# Patient Record
Sex: MALE | Race: WHITE | NOT HISPANIC OR LATINO | Employment: FULL TIME | URBAN - METROPOLITAN AREA
[De-identification: names, ages, dates, MRNs, and addresses within clinical notes are randomized per-mention and may not be internally consistent; named-entity substitution may affect disease eponyms.]

---

## 2017-02-20 ENCOUNTER — HOSPITAL ENCOUNTER (INPATIENT)
Facility: MEDICAL CENTER | Age: 62
LOS: 2 days | DRG: 312 | End: 2017-02-22
Attending: EMERGENCY MEDICINE | Admitting: INTERNAL MEDICINE

## 2017-02-20 ENCOUNTER — APPOINTMENT (OUTPATIENT)
Dept: RADIOLOGY | Facility: MEDICAL CENTER | Age: 62
DRG: 312 | End: 2017-02-20
Attending: INTERNAL MEDICINE

## 2017-02-20 ENCOUNTER — APPOINTMENT (OUTPATIENT)
Dept: RADIOLOGY | Facility: MEDICAL CENTER | Age: 62
DRG: 312 | End: 2017-02-20
Attending: EMERGENCY MEDICINE

## 2017-02-20 ENCOUNTER — RESOLUTE PROFESSIONAL BILLING HOSPITAL PROF FEE (OUTPATIENT)
Dept: HOSPITALIST | Facility: MEDICAL CENTER | Age: 62
End: 2017-02-20

## 2017-02-20 DIAGNOSIS — R00.1 BRADYCARDIA WITH 51-60 BEATS PER MINUTE: ICD-10-CM

## 2017-02-20 DIAGNOSIS — R09.02 HYPOXIA: ICD-10-CM

## 2017-02-20 DIAGNOSIS — I95.1 ORTHOSTATIC HYPOTENSION: ICD-10-CM

## 2017-02-20 DIAGNOSIS — I95.2 HYPOTENSION DUE TO DRUGS: ICD-10-CM

## 2017-02-20 DIAGNOSIS — R19.5 FECAL OCCULT BLOOD TEST POSITIVE: ICD-10-CM

## 2017-02-20 DIAGNOSIS — R55 SYNCOPE, UNSPECIFIED SYNCOPE TYPE: ICD-10-CM

## 2017-02-20 LAB
ALBUMIN SERPL BCP-MCNC: 3.8 G/DL (ref 3.2–4.9)
ALBUMIN/GLOB SERPL: 1.2 G/DL
ALP SERPL-CCNC: 63 U/L (ref 30–99)
ALT SERPL-CCNC: 17 U/L (ref 2–50)
ANION GAP SERPL CALC-SCNC: 11 MMOL/L (ref 0–11.9)
AST SERPL-CCNC: 14 U/L (ref 12–45)
BASOPHILS # BLD AUTO: 0.5 % (ref 0–1.8)
BASOPHILS # BLD: 0.05 K/UL (ref 0–0.12)
BILIRUB SERPL-MCNC: 0.5 MG/DL (ref 0.1–1.5)
BNP SERPL-MCNC: 6 PG/ML (ref 0–100)
BUN SERPL-MCNC: 31 MG/DL (ref 8–22)
CALCIUM SERPL-MCNC: 9 MG/DL (ref 8.5–10.5)
CHLORIDE SERPL-SCNC: 106 MMOL/L (ref 96–112)
CO2 SERPL-SCNC: 20 MMOL/L (ref 20–33)
CREAT SERPL-MCNC: 1.56 MG/DL (ref 0.5–1.4)
DEPRECATED D DIMER PPP IA-ACNC: 289 NG/ML(D-DU)
EOSINOPHIL # BLD AUTO: 0.15 K/UL (ref 0–0.51)
EOSINOPHIL NFR BLD: 1.4 % (ref 0–6.9)
ERYTHROCYTE [DISTWIDTH] IN BLOOD BY AUTOMATED COUNT: 38.8 FL (ref 35.9–50)
GFR SERPL CREATININE-BSD FRML MDRD: 46 ML/MIN/1.73 M 2
GLOBULIN SER CALC-MCNC: 3.3 G/DL (ref 1.9–3.5)
GLUCOSE SERPL-MCNC: 133 MG/DL (ref 65–99)
HCT VFR BLD AUTO: 39.6 % (ref 42–52)
HGB BLD-MCNC: 13.2 G/DL (ref 14–18)
IMM GRANULOCYTES # BLD AUTO: 0.04 K/UL (ref 0–0.11)
IMM GRANULOCYTES NFR BLD AUTO: 0.4 % (ref 0–0.9)
LYMPHOCYTES # BLD AUTO: 3.25 K/UL (ref 1–4.8)
LYMPHOCYTES NFR BLD: 30 % (ref 22–41)
MCH RBC QN AUTO: 28.8 PG (ref 27–33)
MCHC RBC AUTO-ENTMCNC: 33.3 G/DL (ref 33.7–35.3)
MCV RBC AUTO: 86.5 FL (ref 81.4–97.8)
MONOCYTES # BLD AUTO: 0.62 K/UL (ref 0–0.85)
MONOCYTES NFR BLD AUTO: 5.7 % (ref 0–13.4)
NEUTROPHILS # BLD AUTO: 6.73 K/UL (ref 1.82–7.42)
NEUTROPHILS NFR BLD: 62 % (ref 44–72)
NRBC # BLD AUTO: 0 K/UL
NRBC BLD AUTO-RTO: 0 /100 WBC
PLATELET # BLD AUTO: 264 K/UL (ref 164–446)
PMV BLD AUTO: 10.5 FL (ref 9–12.9)
POTASSIUM SERPL-SCNC: 3.8 MMOL/L (ref 3.6–5.5)
PROT SERPL-MCNC: 7.1 G/DL (ref 6–8.2)
RBC # BLD AUTO: 4.58 M/UL (ref 4.7–6.1)
SODIUM SERPL-SCNC: 137 MMOL/L (ref 135–145)
TROPONIN I SERPL-MCNC: <0.01 NG/ML (ref 0–0.04)
WBC # BLD AUTO: 10.8 K/UL (ref 4.8–10.8)

## 2017-02-20 PROCEDURE — 96374 THER/PROPH/DIAG INJ IV PUSH: CPT

## 2017-02-20 PROCEDURE — 80053 COMPREHEN METABOLIC PANEL: CPT

## 2017-02-20 PROCEDURE — 85379 FIBRIN DEGRADATION QUANT: CPT

## 2017-02-20 PROCEDURE — 74000 DX-ABDOMEN-1 VIEW: CPT

## 2017-02-20 PROCEDURE — C9113 INJ PANTOPRAZOLE SODIUM, VIA: HCPCS | Performed by: EMERGENCY MEDICINE

## 2017-02-20 PROCEDURE — 99223 1ST HOSP IP/OBS HIGH 75: CPT | Performed by: INTERNAL MEDICINE

## 2017-02-20 PROCEDURE — 700101 HCHG RX REV CODE 250: Performed by: EMERGENCY MEDICINE

## 2017-02-20 PROCEDURE — 84484 ASSAY OF TROPONIN QUANT: CPT

## 2017-02-20 PROCEDURE — 99285 EMERGENCY DEPT VISIT HI MDM: CPT

## 2017-02-20 PROCEDURE — 770020 HCHG ROOM/CARE - TELE (206)

## 2017-02-20 PROCEDURE — 93306 TTE W/DOPPLER COMPLETE: CPT | Mod: 26 | Performed by: INTERNAL MEDICINE

## 2017-02-20 PROCEDURE — 700111 HCHG RX REV CODE 636 W/ 250 OVERRIDE (IP): Performed by: EMERGENCY MEDICINE

## 2017-02-20 PROCEDURE — 93005 ELECTROCARDIOGRAM TRACING: CPT | Performed by: EMERGENCY MEDICINE

## 2017-02-20 PROCEDURE — 82272 OCCULT BLD FECES 1-3 TESTS: CPT

## 2017-02-20 PROCEDURE — 700105 HCHG RX REV CODE 258: Performed by: EMERGENCY MEDICINE

## 2017-02-20 PROCEDURE — 85025 COMPLETE CBC W/AUTO DIFF WBC: CPT

## 2017-02-20 PROCEDURE — 94640 AIRWAY INHALATION TREATMENT: CPT

## 2017-02-20 PROCEDURE — 83540 ASSAY OF IRON: CPT

## 2017-02-20 PROCEDURE — 82728 ASSAY OF FERRITIN: CPT

## 2017-02-20 PROCEDURE — 83550 IRON BINDING TEST: CPT

## 2017-02-20 PROCEDURE — 83880 ASSAY OF NATRIURETIC PEPTIDE: CPT

## 2017-02-20 PROCEDURE — 71010 DX-CHEST-PORTABLE (1 VIEW): CPT

## 2017-02-20 PROCEDURE — 96361 HYDRATE IV INFUSION ADD-ON: CPT

## 2017-02-20 PROCEDURE — 36415 COLL VENOUS BLD VENIPUNCTURE: CPT

## 2017-02-20 RX ORDER — BISACODYL 10 MG
10 SUPPOSITORY, RECTAL RECTAL
Status: DISCONTINUED | OUTPATIENT
Start: 2017-02-20 | End: 2017-02-22 | Stop reason: HOSPADM

## 2017-02-20 RX ORDER — SODIUM CHLORIDE 9 MG/ML
INJECTION, SOLUTION INTRAVENOUS CONTINUOUS
Status: DISCONTINUED | OUTPATIENT
Start: 2017-02-21 | End: 2017-02-21

## 2017-02-20 RX ORDER — LOVASTATIN 20 MG/1
20 TABLET ORAL NIGHTLY
Status: DISCONTINUED | OUTPATIENT
Start: 2017-02-21 | End: 2017-02-22 | Stop reason: HOSPADM

## 2017-02-20 RX ORDER — GLYBURIDE 2.5 MG/1
2.5 TABLET ORAL 2 TIMES DAILY WITH MEALS
Status: DISCONTINUED | OUTPATIENT
Start: 2017-02-21 | End: 2017-02-22 | Stop reason: HOSPADM

## 2017-02-20 RX ORDER — ONDANSETRON 4 MG/1
4 TABLET, FILM COATED ORAL 2 TIMES DAILY PRN
Status: DISCONTINUED | OUTPATIENT
Start: 2017-02-20 | End: 2017-02-20

## 2017-02-20 RX ORDER — ONDANSETRON 2 MG/ML
4 INJECTION INTRAMUSCULAR; INTRAVENOUS EVERY 4 HOURS PRN
Status: DISCONTINUED | OUTPATIENT
Start: 2017-02-20 | End: 2017-02-22 | Stop reason: HOSPADM

## 2017-02-20 RX ORDER — LACTULOSE 20 G/30ML
30 SOLUTION ORAL
Status: DISCONTINUED | OUTPATIENT
Start: 2017-02-20 | End: 2017-02-22 | Stop reason: HOSPADM

## 2017-02-20 RX ORDER — ENEMA 19; 7 G/133ML; G/133ML
1 ENEMA RECTAL
Status: DISCONTINUED | OUTPATIENT
Start: 2017-02-20 | End: 2017-02-22 | Stop reason: HOSPADM

## 2017-02-20 RX ORDER — SODIUM CHLORIDE 9 MG/ML
1000 INJECTION, SOLUTION INTRAVENOUS ONCE
Status: COMPLETED | OUTPATIENT
Start: 2017-02-20 | End: 2017-02-20

## 2017-02-20 RX ORDER — CHLORAL HYDRATE 500 MG
1000 CAPSULE ORAL 2 TIMES DAILY
Status: DISCONTINUED | OUTPATIENT
Start: 2017-02-21 | End: 2017-02-22 | Stop reason: HOSPADM

## 2017-02-20 RX ORDER — FAMOTIDINE 20 MG/1
20 TABLET, FILM COATED ORAL 2 TIMES DAILY
Status: DISCONTINUED | OUTPATIENT
Start: 2017-02-21 | End: 2017-02-22 | Stop reason: HOSPADM

## 2017-02-20 RX ORDER — IPRATROPIUM BROMIDE AND ALBUTEROL SULFATE 2.5; .5 MG/3ML; MG/3ML
3 SOLUTION RESPIRATORY (INHALATION) ONCE
Status: COMPLETED | OUTPATIENT
Start: 2017-02-20 | End: 2017-02-20

## 2017-02-20 RX ORDER — ACETAMINOPHEN 325 MG/1
650 TABLET ORAL EVERY 6 HOURS PRN
Status: DISCONTINUED | OUTPATIENT
Start: 2017-02-20 | End: 2017-02-22 | Stop reason: HOSPADM

## 2017-02-20 RX ORDER — LORAZEPAM 1 MG/1
0.5 TABLET ORAL EVERY 6 HOURS PRN
Status: DISCONTINUED | OUTPATIENT
Start: 2017-02-20 | End: 2017-02-21

## 2017-02-20 RX ORDER — ONDANSETRON 4 MG/1
4 TABLET, ORALLY DISINTEGRATING ORAL EVERY 4 HOURS PRN
Status: DISCONTINUED | OUTPATIENT
Start: 2017-02-20 | End: 2017-02-22 | Stop reason: HOSPADM

## 2017-02-20 RX ORDER — LABETALOL HYDROCHLORIDE 5 MG/ML
10 INJECTION, SOLUTION INTRAVENOUS EVERY 4 HOURS PRN
Status: DISCONTINUED | OUTPATIENT
Start: 2017-02-20 | End: 2017-02-21

## 2017-02-20 RX ORDER — RISPERIDONE 1 MG/1
3 TABLET ORAL
Status: DISCONTINUED | OUTPATIENT
Start: 2017-02-21 | End: 2017-02-22 | Stop reason: HOSPADM

## 2017-02-20 RX ORDER — DOCUSATE SODIUM 100 MG/1
100 CAPSULE, LIQUID FILLED ORAL 2 TIMES DAILY
Status: DISCONTINUED | OUTPATIENT
Start: 2017-02-20 | End: 2017-02-22 | Stop reason: HOSPADM

## 2017-02-20 RX ORDER — PROMETHAZINE HYDROCHLORIDE 25 MG/1
12.5-25 TABLET ORAL EVERY 4 HOURS PRN
Status: DISCONTINUED | OUTPATIENT
Start: 2017-02-20 | End: 2017-02-22 | Stop reason: HOSPADM

## 2017-02-20 RX ORDER — HEPARIN SODIUM 5000 [USP'U]/ML
5000 INJECTION, SOLUTION INTRAVENOUS; SUBCUTANEOUS EVERY 8 HOURS
Status: DISCONTINUED | OUTPATIENT
Start: 2017-02-21 | End: 2017-02-22 | Stop reason: HOSPADM

## 2017-02-20 RX ORDER — AMOXICILLIN 250 MG
1 CAPSULE ORAL
Status: DISCONTINUED | OUTPATIENT
Start: 2017-02-20 | End: 2017-02-22 | Stop reason: HOSPADM

## 2017-02-20 RX ORDER — PRAZOSIN HYDROCHLORIDE 1 MG/1
1 CAPSULE ORAL NIGHTLY
Status: DISCONTINUED | OUTPATIENT
Start: 2017-02-21 | End: 2017-02-21

## 2017-02-20 RX ORDER — GEMFIBROZIL 600 MG/1
600 TABLET, FILM COATED ORAL 2 TIMES DAILY
Status: DISCONTINUED | OUTPATIENT
Start: 2017-02-20 | End: 2017-02-21

## 2017-02-20 RX ORDER — DEXTROSE MONOHYDRATE 25 G/50ML
25 INJECTION, SOLUTION INTRAVENOUS
Status: DISCONTINUED | OUTPATIENT
Start: 2017-02-20 | End: 2017-02-22 | Stop reason: HOSPADM

## 2017-02-20 RX ORDER — GABAPENTIN 300 MG/1
300 CAPSULE ORAL 3 TIMES DAILY
Status: DISCONTINUED | OUTPATIENT
Start: 2017-02-21 | End: 2017-02-22 | Stop reason: HOSPADM

## 2017-02-20 RX ORDER — AMOXICILLIN 250 MG
1 CAPSULE ORAL NIGHTLY
Status: DISCONTINUED | OUTPATIENT
Start: 2017-02-20 | End: 2017-02-22 | Stop reason: HOSPADM

## 2017-02-20 RX ORDER — PROMETHAZINE HYDROCHLORIDE 25 MG/1
12.5-25 SUPPOSITORY RECTAL EVERY 4 HOURS PRN
Status: DISCONTINUED | OUTPATIENT
Start: 2017-02-20 | End: 2017-02-22 | Stop reason: HOSPADM

## 2017-02-20 RX ADMIN — SODIUM CHLORIDE 80 MG: 9 INJECTION, SOLUTION INTRAVENOUS at 23:48

## 2017-02-20 RX ADMIN — IPRATROPIUM BROMIDE AND ALBUTEROL SULFATE 3 ML: .5; 3 SOLUTION RESPIRATORY (INHALATION) at 23:18

## 2017-02-20 RX ADMIN — SODIUM CHLORIDE 1000 ML: 9 INJECTION, SOLUTION INTRAVENOUS at 23:03

## 2017-02-20 RX ADMIN — SODIUM CHLORIDE 1000 ML: 9 INJECTION, SOLUTION INTRAVENOUS at 20:24

## 2017-02-20 RX ADMIN — SODIUM CHLORIDE 1000 ML: 9 INJECTION, SOLUTION INTRAVENOUS at 22:39

## 2017-02-20 ASSESSMENT — COPD QUESTIONNAIRES
HAVE YOU SMOKED AT LEAST 100 CIGARETTES IN YOUR ENTIRE LIFE: NO/DON'T KNOW
DO YOU EVER COUGH UP ANY MUCUS OR PHLEGM?: NO/ONLY WITH OCCASIONAL COLDS OR INFECTIONS
DURING THE PAST 4 WEEKS HOW MUCH DID YOU FEEL SHORT OF BREATH: SOME OF THE TIME
COPD SCREENING SCORE: 3

## 2017-02-20 ASSESSMENT — LIFESTYLE VARIABLES: EVER_SMOKED: NEVER

## 2017-02-20 NOTE — IP AVS SNAPSHOT
centrose Access Code: [email or web address]  Expires: 3/24/2017  5:44 PM    Your email address is not on file at [facility].  Email Addresses are required for you to sign up for centrose, please contact [phone] to verify your personal information and to provide your email address prior to attempting to register for centrose.    Satya Aguilar  [facility] [address]    centrose  A secure, online tool to manage your health information     [facility]’s centrose® is a secure, online tool that connects you to your personalized health information from the privacy of your home -- day or night - making it very easy for you to manage your healthcare. Once the activation process is completed, you can even access your medical information using the centrose janice, which is available for free in the Apple Janice store or Google Play store.     To learn more about centrose, visit [email or web address]    There are two levels of access available (as shown below):   My Chart Features  [facility] Primary Care Doctor [facility]  Specialists [facility]  Urgent  Care Non-[facility] Primary Care Doctor   Email your healthcare team securely and privately 24/7 X X X    Manage appointments: schedule your next appointment; view details of past/upcoming appointments X      Request prescription refills. X      View recent personal medical records, including lab and immunizations X X X X   View health record, including health history, allergies, medications X X X X   Read reports about your outpatient visits, procedures, consult and ER notes X X X X   See your discharge summary, which is a recap of your hospital and/or ER visit that includes your diagnosis, lab results, and care plan X X  X     How to register for NextDocst:  Once your e-mail address has been verified, follow the following steps to sign up for NextDocst.     1. Go to  [email or web address]  2. Click on the Sign Up Now box, which takes you to the New Member Sign Up  page. You will need to provide the following information:  a. Enter your MobileTag Access Code exactly as it appears at the top of this page. (You will not need to use this code after you’ve completed the sign-up process. If you do not sign up before the expiration date, you must request a new code.)   b. Enter your date of birth.   c. Enter your home email address.   d. Click Submit, and follow the next screen’s instructions.  3. Create a MobileTag ID. This will be your MobileTag login ID and cannot be changed, so think of one that is secure and easy to remember.  4. Create a MobileTag password. You can change your password at any time.  5. Enter your Password Reset Question and Answer. This can be used at a later time if you forget your password.   6. Enter your e-mail address. This allows you to receive e-mail notifications when new information is available in MobileTag.  7. Click Sign Up. You can now view your health information.    For assistance activating your MobileTag account, call [phone]

## 2017-02-20 NOTE — IP AVS SNAPSHOT
" Home Care Instructions                                                                                                                  Name:Satya Aguilar  Medical Record Number:[id number]  CSN: [id number]    YOB: 1955   Age: 61 y.o.  Sex: male  HT:1.905 m (6' 3\") WT: 98 kg (216 lb 0.8 oz)          Admit Date: 2/20/2017     Discharge Date:   Today's Date: 2/22/2017  Attending Doctor:  Kayla Reynolds M.D.                  Allergies:  Review of patient's allergies indicates no known allergies.            Discharge Instructions       Discharge Instructions    Discharged to home by taxi with self. Discharged via walking, hospital escort: Refused.  Special equipment needed: Oxygen    Be sure to schedule a follow-up appointment with your primary care doctor or any specialists as instructed.     Discharge Plan:   Diet Plan: Discussed  Activity Level: Discussed  Confirmed Follow up Appointment: Patient to Call and Schedule Appointment  Confirmed Symptoms Management: Discussed  Medication Reconciliation Updated: Yes  Influenza Vaccine Indication: Not indicated: Previously immunized this influenza season and > 8 years of age    I understand that a diet low in cholesterol, fat, and sodium is recommended for good health. Unless I have been given specific instructions below for another diet, I accept this instruction as my diet prescription.   Other diet: diabetic    Special Instructions: syncope      · Is patient discharged on Warfarin / Coumadin? No    · Is patient Post Blood Transfusion? No       Depression / Suicide Risk    As you are discharged from this [facility] facility, it is important to learn how to keep safe from harming yourself.    Recognize the warning signs:  · Abrupt changes in personality, positive or negative- including increase in energy   · Giving away possessions  · Change in eating patterns- significant weight changes-  positive or negative  · Change in sleeping patterns- unable to sleep or " sleeping all the time   · Unwillingness or inability to communicate  · Depression  · Unusual sadness, discouragement and loneliness  · Talk of wanting to die  · Neglect of personal appearance   · Rebelliousness- reckless behavior  · Withdrawal from people/activities they love  · Confusion- inability to concentrate     If you or a loved one observes any of these behaviors or has concerns about self-harm, here's what you can do:  · Talk about it- your feelings and reasons for harming yourself  · Remove any means that you might use to hurt yourself (examples: pills, rope, extension cords, firearm)  · Get professional help from the community (Mental Health, Substance Abuse, psychological counseling)  · Do not be alone:Call your Safe Contact- someone whom you trust who will be there for you.  · Call your local CRISIS HOTLINE 832-4816 or 626-191-7612  · Call your local Children's Mobile Crisis Response Team [place] (560) 542-5733 or www.[address] Bio  · Call the toll free National Suicide Prevention Hotlines   · National Suicide Prevention Lifeline 040-378-CHDA (4569)  · National Hope Line Network 800-SUICIDE (640-3673)           Discharge Medication Instructions:    Below are the medications your physician expects you to take upon discharge:    Review all your home medications and newly ordered medications with your doctor and/or pharmacist. Follow medication instructions as directed by your doctor and/or pharmacist.    Please keep your medication list with you and share with your physician.               Medication List      START taking these medications        Instructions    albuterol 108 (90 BASE) MCG/ACT Aers inhalation aerosol   Last time this was given:  2 Puffs on 2/22/2017  8:45 AM    Inhale 2 Puffs by mouth every 1 hour as needed.   Dose:  2 Puff       ascorbic acid 500 MG tablet   Last time this was given:  500 mg on 2/22/2017  2:17 PM   Commonly known as:  VITAMIN C    Take 1 Tab by mouth 3 times a  day.   Dose:  500 mg       budesonide-formoterol 160-4.5 MCG/ACT Aero   Last time this was given:  2 Puffs on 2/22/2017  2:11 PM   Commonly known as:  SYMBICORT    Inhale 2 Puffs by mouth 2 Times a Day.   Dose:  2 Puff       ferrous sulfate 325 (65 FE) MG tablet   Last time this was given:  325 mg on 2/22/2017  2:10 PM    Take 1 Tab by mouth 3 times a day, with meals.   Dose:  325 mg       tiotropium 18 MCG Caps   Last time this was given:  1 Cap on 2/22/2017  2:11 PM   Commonly known as:  SPIRIVA    Inhale 1 Cap by mouth every day.   Dose:  18 mcg         CONTINUE taking these medications        Instructions    citalopram 10 MG tablet   Commonly known as:  CELEXA    Take 10 mg by mouth every day.   Dose:  10 mg       fish oil 1000 MG Caps capsule   Last time this was given:  1,000 mg on 2/22/2017  8:24 AM    Take 1,000 mg by mouth 2 Times a Day.   Dose:  1000 mg       gabapentin 300 MG Caps   Last time this was given:  300 mg on 2/22/2017  2:17 PM   Commonly known as:  NEURONTIN    Take 300 mg by mouth 3 times a day.   Dose:  300 mg       glyBURIDE 2.5 MG Tabs   Last time this was given:  2.5 mg on 2/22/2017  8:23 AM   Commonly known as:  DIABETA    Take 2.5 mg by mouth 2 times a day, with meals.   Dose:  2.5 mg       lovastatin 20 MG Tabs   Last time this was given:  20 mg on 2/21/2017  9:14 PM   Commonly known as:  MEVACOR    Take 20 mg by mouth every evening.   Dose:  20 mg       metformin 1000 MG tablet   Commonly known as:  GLUCOPHAGE    Take 1,000 mg by mouth 2 times a day, with meals.   Dose:  1000 mg       risperidone 3 MG Tabs   Last time this was given:  3 mg on 2/21/2017  9:14 PM   Commonly known as:  RISPERDAL    Take 3 mg by mouth every evening.   Dose:  3 mg       risperidone microspheres 37.5 MG injection   Commonly known as:  RISPERDAL CONSTA    37.5 mg by Intramuscular route every 21 days.   Dose:  37.5 mg         STOP taking these medications     lisinopril 5 MG Tabs   Commonly known as:  PRINIVIL        prazosin 1 MG Caps   Commonly known as:  MINIPRESS               Instructions           Diet / Nutrition:    Follow any diet instructions given to you by your doctor or the dietician, including how much salt (sodium) you are allowed each day.    If you are overweight, talk to your doctor about a weight reduction plan.    Activity:    Remain physically active following your doctor's instructions about exercise and activity.    Rest often.     Any time you become even a little tired or short of breath, SIT DOWN and rest.    Worsening Symptoms:    Report any of the following signs and symptoms to the doctor's office immediately:    *Pain of jaw, arm, or neck  *Chest pain not relieved by medication                               *Dizziness or loss of consciousness  *Difficulty breathing even when at rest   *More tired than usual                                       *Bleeding drainage or swelling of surgical site  *Swelling of feet, ankles, legs or stomach                 *Fever (>100ºF)  *Pink or blood tinged sputum  *Weight gain (3lbs/day or 5lbs /week)           *Shock from internal defibrillator (if applicable)  *Palpitations or irregular heartbeats                *Cool and/or numb extremities    Stroke Awareness    Common Risk Factors for Stroke include:    Age  Atrial Fibrillation  Carotid Artery Stenosis  Diabetes Mellitus  Excessive alcohol consumption  High blood pressure  Overweight   Physical inactivity  Smoking    Warning signs and symptoms of a stroke include:    *Sudden numbness or weakness of the face, arm or leg (especially on one side of the body).  *Sudden confusion, trouble speaking or understanding.  *Sudden trouble seeing in one or both eyes.  *Sudden trouble walking, dizziness, loss of balance or coordination.Sudden severe headache with no known cause.    It is very important to get treatment quickly when a stroke occurs. If you experience any of the above warning signs, call 911 immediately.                     Disclaimer         Quit Smoking / Tobacco Use:    I understand the use of any tobacco products increases my chance of suffering from future heart disease or stroke and could cause other illnesses which may shorten my life. Quitting the use of tobacco products is the single most important thing I can do to improve my health. For further information on smoking / tobacco cessation call a Toll Free Quit Line at 1-986.974.4923 (*National Cancer [place]) or 1-317.468.8969 (American Lung Association) or you can access the web based program at www.lungusa.org.    [place] Tobacco Users Help Line:  (115) 769-3924       Toll Free: 1-222.493.5613  Quit Tobacco Program [facility] (055)529-9948    Crisis Hotline:    [place] Crisis Hotline:  1-464-CRBKZWM or 1-910.374.4999    [place] Crisis Hotline:    1-621.223.8686 or 133-835-1988    Discharge Survey:   Thank you for choosing [facility]. We hope we did everything we could to make your hospital stay a pleasant one. You may be receiving a phone survey and we would appreciate your time and participation in answering the questions. Your input is very valuable to us in our efforts to improve our service to our patients and their families.        My signature on this form indicates that:    1. I have reviewed and understand the above information.  2. My questions regarding this information have been answered to my satisfaction.  3. I have formulated a plan with my discharge nurse to obtain my prescribed medications for home.                  Disclaimer         __________________________________                     __________       ________                       Patient Signature                                                 Date                    Time

## 2017-02-20 NOTE — IP AVS SNAPSHOT
2/22/2017          Satya Aguilar  [address]  [place] [address]    Dear Satya:    [facility] wants to ensure your discharge home is safe and you or your loved ones have had all your questions answered regarding your care after you leave the hospital.    You may receive a telephone call within two days of your discharge.  This call is to make certain you understand your discharge instructions as well as ensure we provided you with the best care possible during your stay with us.     The call will only last approximately 3-5 minutes and will be done by a nurse.    Once again, we want to ensure your discharge home is safe and that you have a clear understanding of any next steps in your care.  If you have any questions or concerns, please do not hesitate to contact us, we are here for you.  Thank you for choosing [facility] [place] for your healthcare needs.    Sincerely,    Talat Rosen    [facility]

## 2017-02-20 NOTE — IP AVS SNAPSHOT
" <p align=\"LEFT\"><IMG SRC=\"//[email or web address]\" alt=\"Image\" WIDTH=\"50%\" HEIGHT=\"200\" BORDER=\"\"></p>                   Name:Satya Aguilar  Medical Record Number:[id number]  CSN: [id number]    YOB: 1955   Age: 61 y.o.  Sex: male  HT:1.905 m (6' 3\") WT: 98 kg (216 lb 0.8 oz)          Admit Date: 2/20/2017     Discharge Date:   Today's Date: 2/22/2017  Attending Doctor:  Kayla Reynolds M.D.                  Allergies:  Review of patient's allergies indicates no known allergies.             Medication List      Take these Medications        Instructions    albuterol 108 (90 BASE) MCG/ACT Aers inhalation aerosol    Inhale 2 Puffs by mouth every 1 hour as needed.   Dose:  2 Puff       ascorbic acid 500 MG tablet   Commonly known as:  VITAMIN C    Take 1 Tab by mouth 3 times a day.   Dose:  500 mg       budesonide-formoterol 160-4.5 MCG/ACT Aero   Commonly known as:  SYMBICORT    Inhale 2 Puffs by mouth 2 Times a Day.   Dose:  2 Puff       citalopram 10 MG tablet   Commonly known as:  CELEXA    Take 10 mg by mouth every day.   Dose:  10 mg       ferrous sulfate 325 (65 FE) MG tablet    Take 1 Tab by mouth 3 times a day, with meals.   Dose:  325 mg       fish oil 1000 MG Caps capsule    Take 1,000 mg by mouth 2 Times a Day.   Dose:  1000 mg       gabapentin 300 MG Caps   Commonly known as:  NEURONTIN    Take 300 mg by mouth 3 times a day.   Dose:  300 mg       glyBURIDE 2.5 MG Tabs   Commonly known as:  DIABETA    Take 2.5 mg by mouth 2 times a day, with meals.   Dose:  2.5 mg       lovastatin 20 MG Tabs   Commonly known as:  MEVACOR    Take 20 mg by mouth every evening.   Dose:  20 mg       metformin 1000 MG tablet   Commonly known as:  GLUCOPHAGE    Take 1,000 mg by mouth 2 times a day, with meals.   Dose:  1000 mg       risperidone 3 MG Tabs   Commonly known as:  RISPERDAL    Take 3 mg by mouth every evening.   Dose:  3 mg       risperidone microspheres 37.5 MG injection   Commonly known as:  " RISPERDAL CONSTA    37.5 mg by Intramuscular route every 21 days.   Dose:  37.5 mg       tiotropium 18 MCG Caps   Commonly known as:  SPIRIVA    Inhale 1 Cap by mouth every day.   Dose:  18 mcg

## 2017-02-21 ENCOUNTER — APPOINTMENT (OUTPATIENT)
Dept: RADIOLOGY | Facility: MEDICAL CENTER | Age: 62
DRG: 312 | End: 2017-02-21
Attending: EMERGENCY MEDICINE

## 2017-02-21 ENCOUNTER — APPOINTMENT (OUTPATIENT)
Dept: RADIOLOGY | Facility: MEDICAL CENTER | Age: 62
DRG: 312 | End: 2017-02-21
Attending: INTERNAL MEDICINE

## 2017-02-21 LAB
AMPHET UR QL SCN: NEGATIVE
ANION GAP SERPL CALC-SCNC: 7 MMOL/L (ref 0–11.9)
BARBITURATES UR QL SCN: NEGATIVE
BASOPHILS # BLD AUTO: 0.5 % (ref 0–1.8)
BASOPHILS # BLD: 0.06 K/UL (ref 0–0.12)
BENZODIAZ UR QL SCN: NEGATIVE
BUN SERPL-MCNC: 31 MG/DL (ref 8–22)
BZE UR QL SCN: NEGATIVE
CALCIUM SERPL-MCNC: 8 MG/DL (ref 8.5–10.5)
CANNABINOIDS UR QL SCN: NEGATIVE
CHLORIDE SERPL-SCNC: 111 MMOL/L (ref 96–112)
CO2 SERPL-SCNC: 21 MMOL/L (ref 20–33)
CREAT SERPL-MCNC: 1.54 MG/DL (ref 0.5–1.4)
EOSINOPHIL # BLD AUTO: 0.17 K/UL (ref 0–0.51)
EOSINOPHIL NFR BLD: 1.5 % (ref 0–6.9)
ERYTHROCYTE [DISTWIDTH] IN BLOOD BY AUTOMATED COUNT: 39.6 FL (ref 35.9–50)
ERYTHROCYTE [DISTWIDTH] IN BLOOD BY AUTOMATED COUNT: 40.8 FL (ref 35.9–50)
FERRITIN SERPL-MCNC: 195.6 NG/ML (ref 22–322)
GFR SERPL CREATININE-BSD FRML MDRD: 46 ML/MIN/1.73 M 2
GLUCOSE BLD-MCNC: 102 MG/DL (ref 65–99)
GLUCOSE BLD-MCNC: 131 MG/DL (ref 65–99)
GLUCOSE BLD-MCNC: 81 MG/DL (ref 65–99)
GLUCOSE BLD-MCNC: 90 MG/DL (ref 65–99)
GLUCOSE BLD-MCNC: 98 MG/DL (ref 65–99)
GLUCOSE SERPL-MCNC: 88 MG/DL (ref 65–99)
HCT VFR BLD AUTO: 36.2 % (ref 42–52)
HCT VFR BLD AUTO: 38.7 % (ref 42–52)
HGB BLD-MCNC: 11.6 G/DL (ref 14–18)
HGB BLD-MCNC: 12.3 G/DL (ref 14–18)
IMM GRANULOCYTES # BLD AUTO: 0.03 K/UL (ref 0–0.11)
IMM GRANULOCYTES NFR BLD AUTO: 0.3 % (ref 0–0.9)
IRON SATN MFR SERPL: 9 % (ref 15–55)
IRON SERPL-MCNC: 29 UG/DL (ref 50–180)
LV EJECT FRACT MOD 2C 99903: 66.43
LV EJECT FRACT MOD 4C 99902: 68
LV EJECT FRACT MOD BP 99901: 66.55
LYMPHOCYTES # BLD AUTO: 2.92 K/UL (ref 1–4.8)
LYMPHOCYTES NFR BLD: 26.6 % (ref 22–41)
MCH RBC QN AUTO: 28.6 PG (ref 27–33)
MCH RBC QN AUTO: 28.8 PG (ref 27–33)
MCHC RBC AUTO-ENTMCNC: 31.8 G/DL (ref 33.7–35.3)
MCHC RBC AUTO-ENTMCNC: 32 G/DL (ref 33.7–35.3)
MCV RBC AUTO: 89.2 FL (ref 81.4–97.8)
MCV RBC AUTO: 90.6 FL (ref 81.4–97.8)
MDMA UR QL SCN: NEGATIVE
METHADONE UR QL SCN: NEGATIVE
MONOCYTES # BLD AUTO: 0.91 K/UL (ref 0–0.85)
MONOCYTES NFR BLD AUTO: 8.3 % (ref 0–13.4)
NEUTROPHILS # BLD AUTO: 6.88 K/UL (ref 1.82–7.42)
NEUTROPHILS NFR BLD: 62.8 % (ref 44–72)
NRBC # BLD AUTO: 0 K/UL
NRBC BLD AUTO-RTO: 0 /100 WBC
OPIATES UR QL SCN: NEGATIVE
OXYCODONE UR QL SCN: NEGATIVE
PCP UR QL SCN: NEGATIVE
PLATELET # BLD AUTO: 213 K/UL (ref 164–446)
PLATELET # BLD AUTO: 215 K/UL (ref 164–446)
PMV BLD AUTO: 10.6 FL (ref 9–12.9)
PMV BLD AUTO: 10.7 FL (ref 9–12.9)
POTASSIUM SERPL-SCNC: 3.9 MMOL/L (ref 3.6–5.5)
PROPOXYPH UR QL SCN: NEGATIVE
RBC # BLD AUTO: 4.06 M/UL (ref 4.7–6.1)
RBC # BLD AUTO: 4.27 M/UL (ref 4.7–6.1)
SODIUM SERPL-SCNC: 139 MMOL/L (ref 135–145)
TIBC SERPL-MCNC: 340 UG/DL (ref 250–450)
TROPONIN I SERPL-MCNC: <0.01 NG/ML (ref 0–0.04)
WBC # BLD AUTO: 10.1 K/UL (ref 4.8–10.8)
WBC # BLD AUTO: 11 K/UL (ref 4.8–10.8)

## 2017-02-21 PROCEDURE — 93880 EXTRACRANIAL BILAT STUDY: CPT | Mod: 26 | Performed by: SURGERY

## 2017-02-21 PROCEDURE — 93970 EXTREMITY STUDY: CPT

## 2017-02-21 PROCEDURE — 700102 HCHG RX REV CODE 250 W/ 637 OVERRIDE(OP): Performed by: HOSPITALIST

## 2017-02-21 PROCEDURE — A9270 NON-COVERED ITEM OR SERVICE: HCPCS | Performed by: HOSPITALIST

## 2017-02-21 PROCEDURE — 97161 PT EVAL LOW COMPLEX 20 MIN: CPT

## 2017-02-21 PROCEDURE — 36415 COLL VENOUS BLD VENIPUNCTURE: CPT

## 2017-02-21 PROCEDURE — 70450 CT HEAD/BRAIN W/O DYE: CPT

## 2017-02-21 PROCEDURE — 82962 GLUCOSE BLOOD TEST: CPT | Mod: 91

## 2017-02-21 PROCEDURE — 700102 HCHG RX REV CODE 250 W/ 637 OVERRIDE(OP): Performed by: INTERNAL MEDICINE

## 2017-02-21 PROCEDURE — 93880 EXTRACRANIAL BILAT STUDY: CPT

## 2017-02-21 PROCEDURE — 96375 TX/PRO/DX INJ NEW DRUG ADDON: CPT

## 2017-02-21 PROCEDURE — 700111 HCHG RX REV CODE 636 W/ 250 OVERRIDE (IP): Performed by: INTERNAL MEDICINE

## 2017-02-21 PROCEDURE — 85025 COMPLETE CBC W/AUTO DIFF WBC: CPT

## 2017-02-21 PROCEDURE — 80048 BASIC METABOLIC PNL TOTAL CA: CPT

## 2017-02-21 PROCEDURE — 80307 DRUG TEST PRSMV CHEM ANLYZR: CPT

## 2017-02-21 PROCEDURE — 84484 ASSAY OF TROPONIN QUANT: CPT

## 2017-02-21 PROCEDURE — A9270 NON-COVERED ITEM OR SERVICE: HCPCS | Performed by: INTERNAL MEDICINE

## 2017-02-21 PROCEDURE — 96361 HYDRATE IV INFUSION ADD-ON: CPT

## 2017-02-21 PROCEDURE — G8978 MOBILITY CURRENT STATUS: HCPCS | Mod: CH

## 2017-02-21 PROCEDURE — 700105 HCHG RX REV CODE 258: Performed by: INTERNAL MEDICINE

## 2017-02-21 PROCEDURE — 85027 COMPLETE CBC AUTOMATED: CPT

## 2017-02-21 PROCEDURE — 93306 TTE W/DOPPLER COMPLETE: CPT

## 2017-02-21 PROCEDURE — 700105 HCHG RX REV CODE 258: Performed by: EMERGENCY MEDICINE

## 2017-02-21 PROCEDURE — G8980 MOBILITY D/C STATUS: HCPCS | Mod: CH

## 2017-02-21 PROCEDURE — 700111 HCHG RX REV CODE 636 W/ 250 OVERRIDE (IP): Performed by: EMERGENCY MEDICINE

## 2017-02-21 PROCEDURE — G8979 MOBILITY GOAL STATUS: HCPCS | Mod: CH

## 2017-02-21 PROCEDURE — 99233 SBSQ HOSP IP/OBS HIGH 50: CPT | Performed by: HOSPITALIST

## 2017-02-21 PROCEDURE — 770020 HCHG ROOM/CARE - TELE (206)

## 2017-02-21 PROCEDURE — 93970 EXTREMITY STUDY: CPT | Mod: 26 | Performed by: SURGERY

## 2017-02-21 RX ORDER — GABAPENTIN 300 MG/1
300 CAPSULE ORAL 3 TIMES DAILY
COMMUNITY
End: 2017-05-03

## 2017-02-21 RX ORDER — RISPERIDONE 3 MG/1
4 TABLET ORAL EVERY EVENING
COMMUNITY
End: 2017-06-01

## 2017-02-21 RX ORDER — ASCORBIC ACID 500 MG
500 TABLET ORAL 3 TIMES DAILY
Status: DISCONTINUED | OUTPATIENT
Start: 2017-02-21 | End: 2017-02-22 | Stop reason: HOSPADM

## 2017-02-21 RX ORDER — FERROUS SULFATE 325(65) MG
325 TABLET ORAL
Status: DISCONTINUED | OUTPATIENT
Start: 2017-02-21 | End: 2017-02-22 | Stop reason: HOSPADM

## 2017-02-21 RX ORDER — SODIUM CHLORIDE 9 MG/ML
1000 INJECTION, SOLUTION INTRAVENOUS ONCE
Status: COMPLETED | OUTPATIENT
Start: 2017-02-21 | End: 2017-02-21

## 2017-02-21 RX ORDER — CITALOPRAM HYDROBROMIDE 10 MG/1
10 TABLET ORAL DAILY
Status: ON HOLD | COMMUNITY
End: 2017-06-03

## 2017-02-21 RX ADMIN — GLUCAGON HYDROCHLORIDE 1 MG: 1 INJECTION, POWDER, FOR SOLUTION INTRAMUSCULAR; INTRAVENOUS; SUBCUTANEOUS at 01:00

## 2017-02-21 RX ADMIN — SODIUM CHLORIDE: 9 INJECTION, SOLUTION INTRAVENOUS at 12:42

## 2017-02-21 RX ADMIN — OXYCODONE HYDROCHLORIDE AND ACETAMINOPHEN 500 MG: 500 TABLET ORAL at 15:11

## 2017-02-21 RX ADMIN — OMEGA-3 FATTY ACIDS CAP 1000 MG 1000 MG: 1000 CAP at 21:14

## 2017-02-21 RX ADMIN — HEPARIN SODIUM 5000 UNITS: 5000 INJECTION, SOLUTION INTRAVENOUS; SUBCUTANEOUS at 21:13

## 2017-02-21 RX ADMIN — FAMOTIDINE 20 MG: 20 TABLET, FILM COATED ORAL at 08:26

## 2017-02-21 RX ADMIN — HEPARIN SODIUM 5000 UNITS: 5000 INJECTION, SOLUTION INTRAVENOUS; SUBCUTANEOUS at 15:11

## 2017-02-21 RX ADMIN — SODIUM CHLORIDE: 9 INJECTION, SOLUTION INTRAVENOUS at 03:57

## 2017-02-21 RX ADMIN — FAMOTIDINE 20 MG: 20 TABLET, FILM COATED ORAL at 21:14

## 2017-02-21 RX ADMIN — Medication 325 MG: at 12:41

## 2017-02-21 RX ADMIN — GABAPENTIN 300 MG: 300 CAPSULE ORAL at 15:11

## 2017-02-21 RX ADMIN — SODIUM CHLORIDE 1000 ML: 9 INJECTION, SOLUTION INTRAVENOUS at 00:45

## 2017-02-21 RX ADMIN — OMEGA-3 FATTY ACIDS CAP 1000 MG 1000 MG: 1000 CAP at 08:26

## 2017-02-21 RX ADMIN — GABAPENTIN 300 MG: 300 CAPSULE ORAL at 08:26

## 2017-02-21 RX ADMIN — GLYBURIDE 2.5 MG: 2.5 TABLET ORAL at 17:07

## 2017-02-21 RX ADMIN — Medication 325 MG: at 08:26

## 2017-02-21 RX ADMIN — RISPERIDONE 3 MG: 1 TABLET, FILM COATED ORAL at 21:14

## 2017-02-21 RX ADMIN — Medication 325 MG: at 17:07

## 2017-02-21 RX ADMIN — GLYBURIDE 2.5 MG: 2.5 TABLET ORAL at 08:27

## 2017-02-21 RX ADMIN — OXYCODONE HYDROCHLORIDE AND ACETAMINOPHEN 500 MG: 500 TABLET ORAL at 21:14

## 2017-02-21 RX ADMIN — GABAPENTIN 300 MG: 300 CAPSULE ORAL at 21:14

## 2017-02-21 RX ADMIN — HEPARIN SODIUM 5000 UNITS: 5000 INJECTION, SOLUTION INTRAVENOUS; SUBCUTANEOUS at 06:00

## 2017-02-21 RX ADMIN — LOVASTATIN 20 MG: 20 TABLET ORAL at 21:14

## 2017-02-21 ASSESSMENT — ENCOUNTER SYMPTOMS
FEVER: 0
WEAKNESS: 0
SORE THROAT: 0
MYALGIAS: 0
VOMITING: 0
FOCAL WEAKNESS: 0
EYE PAIN: 0
SHORTNESS OF BREATH: 0
ABDOMINAL PAIN: 0
DIZZINESS: 0
LOSS OF CONSCIOUSNESS: 0
SENSORY CHANGE: 0
DIARRHEA: 0
CHILLS: 0
DEPRESSION: 0
CLAUDICATION: 0
BRUISES/BLEEDS EASILY: 0
COUGH: 0
DIAPHORESIS: 0
NAUSEA: 0
HEADACHES: 0
WHEEZING: 0
SPUTUM PRODUCTION: 0
PALPITATIONS: 0
NECK PAIN: 0
CONSTIPATION: 0
SPEECH CHANGE: 0
EYE DISCHARGE: 0
BACK PAIN: 0
HEMOPTYSIS: 0

## 2017-02-21 ASSESSMENT — LIFESTYLE VARIABLES
TOTAL SCORE: 0
HOW MANY TIMES IN THE PAST YEAR HAVE YOU HAD 5 OR MORE DRINKS IN A DAY: 0
SUBSTANCE_ABUSE: 0
HAVE PEOPLE ANNOYED YOU BY CRITICIZING YOUR DRINKING: NO
HAVE YOU EVER FELT YOU SHOULD CUT DOWN ON YOUR DRINKING: NO
EVER HAD A DRINK FIRST THING IN THE MORNING TO STEADY YOUR NERVES TO GET RID OF A HANGOVER: NO
TOTAL SCORE: 0
TOTAL SCORE: 0
ON A TYPICAL DAY WHEN YOU DRINK ALCOHOL HOW MANY DRINKS DO YOU HAVE: 0
EVER_SMOKED: NEVER
AVERAGE NUMBER OF DAYS PER WEEK YOU HAVE A DRINK CONTAINING ALCOHOL: 0
ALCOHOL_USE: YES
EVER FELT BAD OR GUILTY ABOUT YOUR DRINKING: NO
CONSUMPTION TOTAL: NEGATIVE

## 2017-02-21 ASSESSMENT — GAIT ASSESSMENTS
GAIT LEVEL OF ASSIST: STAND BY ASSIST
DEVIATION: BRADYKINETIC
DISTANCE (FEET): 250

## 2017-02-21 ASSESSMENT — PATIENT HEALTH QUESTIONNAIRE - PHQ9
SUM OF ALL RESPONSES TO PHQ QUESTIONS 1-9: 0
2. FEELING DOWN, DEPRESSED, IRRITABLE, OR HOPELESS: NOT AT ALL
SUM OF ALL RESPONSES TO PHQ9 QUESTIONS 1 AND 2: 0
1. LITTLE INTEREST OR PLEASURE IN DOING THINGS: NOT AT ALL

## 2017-02-21 ASSESSMENT — PAIN SCALES - GENERAL
PAINLEVEL_OUTOF10: 0
PAINLEVEL_OUTOF10: 0

## 2017-02-21 NOTE — ED NOTES
Pt to floor at this time showing no signs of distress/discomfort. Pt resp even unlabored, skin pink warm dry. Denies further needs. Remains SB on monitor.

## 2017-02-21 NOTE — ED NOTES
Med rec updated and complete  Allergies reviewed.  Pt has prescription bottles at bedside.  Discussed medications and last doses  Taken with pt.  Medications remain at bedside.  No controled substances present.

## 2017-02-21 NOTE — ED NOTES
Pt return from CT. Resting on cart no signs of distress. Remains NSR on monitor, no syncopal activity.

## 2017-02-21 NOTE — ED NOTES
"ERP made aware of Vitals. New orders received. Pt resp even unlabored, skin pink warm clammy. PT remains alert and oriented x3, slightly lethargic. Pt states \"I just feel tired.\" FSBS = 80  "

## 2017-02-21 NOTE — H&P
"PRIMARY CARE PHYSICIAN:  Dr. Quinones    CHIEF COMPLAINT:  Syncope x2.    HISTORY OF PRESENT ILLNESS:  This is a 61-year-old male with a past medical   history significant for type 2 diabetes, peripheral neuropathy, bipolar   disorder, schizophrenia and dyslipidemia, who presents today for evaluation of   syncope that occurred twice a day.  Patient states that his wife has told him   that he has had 10 syncopal episodes of his last 3-4 months, but he has not   seek to any medical attention for that.  Today, he had 2 episodes of syncope,   so his wife got concerned and decided to ask him to come to the ER for further   evaluation.  He states that in his first episode of syncope, he lost   consciousness for few minutes.  He does not remember hitting his head. He   denies any seizure activity, urinary/bladder incontinence. He   states that he also felt dizzy and he feels that his vision is not the same.  He   states that he has lately been having blurred vision.  He last had his eyes   checked in November 2016 with [facility] and was told exam his exam was at   baseline. He also tells me he walked to two different [employer] stores today and did not  feel dizzy at that time and did not use any assisting device (walker/cane). He denies any numbness,   tingling that is new.  He has bilateral feet neuropathy that is unchanged and at baseline per patient.  Denies any shortness of breath, chest pain, abdominal pain, cough, fever/chills, nausea/vomiting,   headache.  He denies any palpitations.  The patient was also noted to be   saturating 88% on room air. On arrival to the ER, patient denies any prolonged   immobilization or recent travel. The patient states that he has also had   generalized weakness in his bilateral lower extremities.  He states \"I feel   like my legs have no muscle.\"  The patient states that he drank 2-3 glasses   of water today. Patient received 4 L normal saline in the emergency room. "       REVIEW OF SYSTEMS:  A comprehensive review of systems are conducted and all   pertinent positive and negative are documented in the HPI.    PAST MEDICAL HISTORY:  Type 2 diabetes, peripheral neuropathy, bipolar   disorder, schizophrenia.  The patient gets injections every 3 weeks,   dyslipidemia, hypertension, BPH.  Significant for GERD and hyperlipidemia.    PAST SURGICAL HISTORY:  None.    SOCIAL HISTORY:  Patient denies any tobacco use.  He drinks alcohol   occasionally.  Patient has a medical marijuana card and smokes marijuana   daily.  Patient lives with his wife.    FAMILY HISTORY:  Significant for schizophrenia in both his parents as well as   diabetes in his father.    ALLERGIES:  No known drug allergies.    HOME MEDICATIONS:  1.  Pepcid 20 mg twice a day.  2.  Gabapentin 600 mg twice a day.  3.  Gemfibrozil 600 mg twice a day.  4.  Diabeta 2.5 mg twice a day.  5.  Lisinopril 5 mg daily.  6.  Ativan 0.5 mg every 6 hours p.r.n.  7.  Lovastatin 20 mg daily.  8.  Metformin 1000 mg twice a day.  9.  Naproxen 500 mg twice a day.  10.  Fish oil 1000 mg twice a day.  11.  Zofran 4 mg twice a day p.r.n.  12.  Prazosin 1 mg daily.  13.  Risperdal 1 mg daily.  14.  Simethicone 80 mg every 6 hours p.r.n. for flatulence.  15.  Kenalog 0.1% cream apply to affected area twice daily.    PHYSICAL EXAMINATION:  VITAL SIGNS:  Temperature 36.3, heart rate of 59, respirations 12, blood   pressure of 91/47, O2 saturation of 95% on 3 liters nasal cannula.  GENERAL:  This is a 61-year-old morbidly obese male, in no acute distress.  HEENT:  Normocephalic, atraumatic.  Pupils equal and reactive to light.    Extraocular motions intact.  Dry oral mucosa noted.  Normoactive erythema   noted.  NECK:  Supple.  No lymphadenopathy noted.  CARDIOVASCULAR:  Bradycardia.  No murmurs, rubs or gallops noted.  LUNGS:  Clear to auscultation bilaterally.  No rhonchi, wheezes, or rales   heard.  ABDOMEN:  Obese, soft.  Bowel sounds present.   No rebound tenderness or   guarding noted.  EXTREMITIES:  No clubbing, cyanosis or edema noted.  NEUROLOGIC:  Alert, awake and oriented x3.  No focal deficits noted.  Cranial   nerves II-XII are grossly intact.  Finger-to-nose status normal.  Sensation   intact in bilateral upper and lower extremities, 5/5 motor strength in   bilateral upper and lower extremities.  PSYCHIATRIC:  Normal mood, normal affect, normal judgment.     LABORATORY DATA:  WBC of 10.8, hemoglobin of 13.2, hematocrit of 39.6,   platelets of 269.  D-dimer of 289.  Glucose of 133, BUN of 31, creatinine   1.56.  Troponin less than 0.01.  BNP of 6.  GFR 46.    IMAGING STUDIES:  Chest x-ray shows no acute cardiopulmonary process.  KUB   shows moderate stool suggestion of changes in constipation, otherwise,   nonspecific bowel gas pattern.  EKG, per my read, sinus rhythm, rate of 64,   QTc of 401.  No acute ST or T abnormalities noted.  No previous EKG available   for comparison at this time.    ASSESSMENT AND PLAN:  1.  Syncope, likely secondary to dehydration.  Patient will be admitted and   started on IV fluids.  Patient is placed on fall precautions.  PT evaluation   has been ordered.  Echocardiogram and ultrasound carotid have ordered for   further evaluation.  2.  Loss of consciousness.  CT head has been ordered for further evaluation.  The   patient is presently alert, awake, oriented x4.  No focal deficits are noted.  3.  Type 2 diabetes.  Patient has continued on Diabeta 2.5 mg twice a day.  I   will hold the patient's metformin, as the patient has acute renal failure.    Patient started on insulin sliding scale and Accu-Chek and a progressing   protocol in place.  4.  Acute renal failure, likely secondary to dehydration.  Patient started on   IV fluids and monitor patient's BMP closely.  We will avoid nephrotoxin.  I   will hold patient's lisinopril and metformin at present time.  5.  Peripheral neuropathy.  Patient continued on gabapentin  600 mg twice a   day.  6.  Gastroesophageal reflux disease.  Patient continued on Pepcid 20 mg twice   a day.  7.  Hyperlipidemia.  Patient continued on Lovastatin 20 mg daily.  8.  Bipolar disorder.  Patient continued on risperidone 1 mg daily as well as   patient gets subcutaneous injections every 3 weeks.  9.  Benign prostatic hypertrophy.  Patient continued prazosin 1 mg daily.  10. Hypertension. Will hold patient's lisinopril due to patient's acute renal failure.  He was initially hypotensive on arrival but responded to fluid resuscitation in the emergency  Room.Patient started on labetalol PRN for hypertensive episode.   11. Mildly Elevated D-dimer. U/S Duplex ordered to rule out DVT. Unable  To obtain CTA thorax due to acute renal failure. Well's criteria for pulmonary  Emboli is 0. Low suspicion for pulmonary emboli.  10.  Prophylaxis:  Patient started on heparin subcutaneous for deep vein   thrombosis prophylaxis.  Patient is on Pepcid for gastrointestinal   prophylaxis.  Bowel protocol initiated.    DISPOSITION:  Observation.    CODE STATUS:  Full.       ____________________________________     SARMAD KILPATRICK MD    MS / NTS    DD:  02/20/2017 23:48:23  DT:  02/21/2017 01:45:48    D#:  [id number]  Job#:  [id number]

## 2017-02-21 NOTE — ED NOTES
Pt resting on cart sleeping showing no signs of distress. Resp even unlabored, skin pink warm dry.

## 2017-02-21 NOTE — ED PROVIDER NOTES
"ED Provider Note    ED Provider Note  Scribed for Talat Shaw M.D. by Macarena Zamora. 2/20/2017  8:13 PM    Primary care provider: Pcp Pt States None  Means of arrival: [facility]  History obtained from: Patient    CHIEF COMPLAINT  Chief Complaint   Patient presents with   • Syncope     Seen at 8:13 PM.   HPI  Satya Aguilar is a 61 y.o. male who presents to the Emergency Department with 2 episodes of syncope prior to arrival. He states he is in his usual state of health at approximately 1830 this evening, he was handing his wife a clipboard. After this he got extremely lightheaded and fell to the floor. He blacked out for a couple seconds and woke up and immediately knew where he was. He stood back up and had a 2nd event.   He states this has never happened in the past, he currently feels lightheaded.    He denies headache, numbness, weakness, melena, hematochezia, new medications, bleeding diathesis, chest pain, shortness of breath, nausea, vomiting. He notes some very dull intermittent abdominal pain and diarrhea for the past day.    REVIEW OF SYSTEMS  See HPI,   Remainder of ROS negative. C    PAST MEDICAL HISTORY   has a past medical history of Hypertension; Diabetes (CMS-HCC); Asthma; Sleep apnea; and High cholesterol.    SURGICAL HISTORY  patient denies any surgical history    SOCIAL HISTORY  Social History   Substance Use Topics   • Smoking status: Never Smoker    • Alcohol Use: Yes      Comment: occ      History   Drug Use   • Yes   • Special: Inhaled     Comment: marijuana daily       FAMILY HISTORY  None noted.     CURRENT MEDICATIONS  Reviewed.  See Encounter Summary.     ALLERGIES  No Known Allergies    PHYSICAL EXAM  VITAL SIGNS: BP 88/56 mmHg  Pulse 89  Temp(Src) 36.3 °C (97.3 °F)  Resp 20  Ht 1.905 m (6' 3\")  Wt 103.42 kg (228 lb)  BMI 28.50 kg/m2  Constitutional: Awake, alert in no apparent distress.  HENT: Normocephalic, Bilateral external ears normal. Nose normal.   Eyes: Slight conjunctival " pallor, non-icteric, EOMI.    Thorax & Lungs: Easy unlabored respirations, Clear to ascultation bilaterally.  Cardiovascular: Regular rate, Regular rhythm, No murmurs, rubs or gallops.  Abdomen:  Soft, nontender, nondistended, normal active bowel sounds. No palpable masses.   Skin: Visualized skin is  Dry, No erythema, No rash.   Rectal: brown stool, FOBT positive, control acceptable  Musculoskeletal:   No cyanosis, clubbing or edema.  Neurologic: Alert, Grossly non-focal.   Psychiatric: Normal affect, Normal mood  Lymphatic:  No cervical LAD    EKG   12 lead Interpreted by me  Rhythm:  Normal sinus rhythm   Rate: 89  Axis: normal  Ectopy: none  Conduction: normal  ST Segments: no acute change  T Waves: no acute change  Clinical Impression: Normal EKG without acute changes     RADIOLOGY  [id number] VIEW   Final Result         1.  Moderate stool in the colon suggests changes of constipation, otherwise nonspecific bowel gas pattern      DX-CHEST-PORTABLE (1 VIEW)   Final Result         1.  No acute cardiopulmonary disease.   2.  Hyperexpansion of lungs favors changes of COPD.      CT-CTA CHEST PULMONARY ARTERY W/ RECONS    (Results Pending)   CT-HEAD W/O    (Results Pending)   ECHOCARDIOGRAM COMP W/O CONT    (Results Pending)   CAROTID DUPLEX    (Results Pending)   LE VENOUS DUPLEX - DVT ([facility] Only)    (Results Pending)   The radiologist's interpretation of all radiological studies have been reviewed by me.    COURSE & MEDICAL DECISION MAKING  Pertinent Labs & Imaging studies reviewed. (See chart for details)    8:13 PM - Patient seen and examined at bedside. Patient will be treated with IV fluids. Ordered abdomen x-ray, CT-CTA chest pulmonary artery, chest x-ray, d-dimer, CBC with differential, CMP, troponin, BNP, estimated GFR, and EKG to evaluate his symptoms.     10:30 PM- patient feels slight improvement. Unfortunately IV fluids are quite slow in delivery. The patient has only received  approximately 400 mL. He is still slightly hypotensive though not tachycardic. Repeat abdominal exam shows a nontender abdomen.    10:41 PM Paged hospitalist.     10:45 PM Upon reassessment, patient is resting in bed. Bedside ultrasound performed indicating negative FAST and normal abdominal aorta. Rectal exam performed, as indicated above.     10:56 PM Spoke with , hospitalist, concerning patient case. Agreed to admit patient for further treatment and evaluation.     11:00 PM Upon reassessment, additional bedside ultrasound performed.  The IVC is easily compressible. There is no evidence of right heart strain. Abdominal ultrasound is negative for free fluid, the abdominal aorta is visualized and normal in caliber.    12:53 AM- Pt continues to be hypotensive after 3L. Will give glucagon, repeat h/hct and trop.     1:35 AM- BP and HR now normal after Glucagon. Follow up H/hct dropped 2 points since admission (though received 4L of IVF in the interim).     Decision Making:  This is a 61 y.o. year old male who presents with what appears to be orthostatic syncope. He was significantly hypotensive on arrival. EKG is unremarkable, troponin is negative, he denies any chest pain or shortness of breath. I do not believe this is cardiogenic. Abdominal aorta is unremarkable, there is no evidence of AAA. There is no free fluid to suggest leaking AAA.    Hemoglobin is normal, he denies any melena or hematochezia, fecal occult is positive though stools brown, it seems unlikely that he would have an acute severe GI bleed with a normal hemoglobin and lack of melena. In other cases given Protonix, I do not feel he requires emergent endoscopy.    The patient does note diarrhea for the past 2 days he also has acute kidney injury, this is suggestive of prerenal and orthostatic syncope.    Of note he is hypoxic, and saturations are between 85 and 88% on room air. He notes a history of asthma. I did order a d-dimer that  is mildly elevated. I initially planned to obtain a CT however radiology will not perform it secondary to elevated creatinine. I will administer DuoNeb treatments and admit patient for IV resuscitation. If his condition improves and his renal function improves he may not require a CTA of the chest were Beg[facility] as an inpatient if he has sufficient renal function tomorrow morning. Currently my suspicion is rather low, I do not feel that he needs emergent anticoagulation.    The patient will be admitted to the hospitalist in guarded condition.    Critical care attestation:  This is a potentially critically ill patient.  45 minutes spent re-evaluating patient, interpreting results and discussing with consultants.     DISPOSITION:  Patient will be admitted to Dr. Laura in guarded condition.      FINAL IMPRESSION  1. Hypoxia    2. Syncope, unspecified syncope type    3. Orthostatic hypotension    4. Fecal occult blood test positive          Macarena RODRIGUEZ (Scribe), am scribing for, and in the presence of, Talat Shaw M.D..    Electronically signed by: Macarena Zamora (Scribe), 2/20/2017    Talat RODRIGUEZ M.D. personally performed the services described in this documentation, as scribed by Macarena Zamora in my presence, and it is both accurate and complete.

## 2017-02-21 NOTE — THERAPY
"Physical Therapy Evaluation completed.   Bed Mobility:  Supine to Sit: Independent  Transfers: Sit to Stand: Supervised  Gait: Level Of Assist: Stand by Assist with No Equipment Needed       Plan of Care: Patient with no further skilled PT needs in the acute care setting at this time  Discharge Recommendations: Equipment: No Equipment Needed.     See \"Rehab Therapy-Acute\" Patient Summary Report for complete documentation.     Pt demonstrates good functional mobility pt put on pants at EOB and amb 250 ft without assisance. Pt reports that he is feeling good this date and that he is back at his baseline. 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 Pt reports that he feels he would be able to complete all activities needed this date. Pt was provided with HEP seated exercises to maintain strength and ROM and encouraged to amb with staff 2 more times this date. Pt will not need skilled acute PT at this time.   "

## 2017-02-21 NOTE — ED NOTES
"Pt to ER per squad. Pt states had x2 syncopal episodes. Pt states legs went weak and blacked out at 1830.  Pt states awoke stood up, \"felt dizzy\" and blacked out again. Pt to ER c/o dizziness. SPO2 88% on RA  "

## 2017-02-21 NOTE — ED NOTES
Pt resting on cart, denies increase dizziness.  2nd IV established with 2nd liter on pressure bag. Pt remains alert and oriented x3, resp even unlabored, skin pink warm dry.

## 2017-02-21 NOTE — PROGRESS NOTES
Received report on patient from Ezequiel, patient arrived to floor and assumed care. Verified patient is on the monitor, vitals taken and assessment complete. Patient very lethargic, wanting to just sleep at this time and not complete admit profile.  No complaints of pain. Bed alarm on and call light in reach.

## 2017-02-21 NOTE — PROGRESS NOTES
Hospital Medicine Progress Note, Adult, Complex               Author: Kayla Reynolds Date & Time created: 2/21/2017  2:53 PM     Interval History:  CC:  Syncope x 2 with h/o 10 syncopal episodes in last 3-4 months.    2/20:  Admitted 61yoM with multiple syncopal episodes in last 3 months initially thought to be dehydration.  He had been given 4 L IVFs in ER and IVFs overnight with persistent low BP and pulse rate.  2/21:  dc'd minipress, lisinopril had been held on admission.  BP remains low 90/50 with low heart rate 51, NSR on monitor, but down to 40s at times.  No signs of infection. Patient wants to go home today, but may need to monitor HR, BP off IVFs and minipress to see if any need for pacemaker.      Review of Systems:  Review of Systems   Constitutional: Negative for fever, chills, malaise/fatigue and diaphoresis.   HENT: Negative for congestion and sore throat.    Eyes: Negative for pain and discharge.   Respiratory: Negative for cough, hemoptysis, sputum production, shortness of breath and wheezing.    Cardiovascular: Negative for chest pain, palpitations, claudication and leg swelling.   Gastrointestinal: Negative for nausea, vomiting, abdominal pain, diarrhea, constipation and melena.   Genitourinary: Negative for dysuria, urgency and frequency.   Musculoskeletal: Negative for myalgias, back pain, joint pain and neck pain.   Skin: Negative for itching and rash.   Neurological: Negative for dizziness, sensory change, speech change, focal weakness, loss of consciousness, weakness and headaches.        Syncope   Endo/Heme/Allergies: Does not bruise/bleed easily.   Psychiatric/Behavioral: Negative for depression, suicidal ideas and substance abuse.       Physical Exam:  Physical Exam   Constitutional: He is oriented to person, place, and time. He appears well-developed and well-nourished. No distress.   HENT:   Head: Normocephalic and atraumatic.   Mouth/Throat: Oropharynx is clear and moist. No  oropharyngeal exudate.   Eyes: Conjunctivae and EOM are normal. Pupils are equal, round, and reactive to light. Right eye exhibits no discharge. Left eye exhibits no discharge. No scleral icterus.   Neck: Normal range of motion. Neck supple. No JVD present. No tracheal deviation present. No thyromegaly present.   Cardiovascular: Normal rate, regular rhythm and normal heart sounds.  Exam reveals no gallop and no friction rub.    No murmur heard.  Pulmonary/Chest: Effort normal and breath sounds normal. No respiratory distress. He has no wheezes. He has no rales. He exhibits no tenderness.   Abdominal: Soft. Bowel sounds are normal. He exhibits no distension and no mass. There is no tenderness. There is no rebound and no guarding.   Musculoskeletal: Normal range of motion. He exhibits no edema or tenderness.   Lymphadenopathy:     He has no cervical adenopathy.   Neurological: He is alert and oriented to person, place, and time. No cranial nerve deficit. He exhibits normal muscle tone.   Skin: Skin is warm and dry. No rash noted. He is not diaphoretic. No erythema.   Psychiatric: He has a normal mood and affect. His behavior is normal. Judgment and thought content normal.   Vitals reviewed.      Labs:        Invalid input(s): [id number]  Recent Labs      02/20/17 2014 02/21/17 0046   TROPONINI  <0.01  <0.01   BNPBTYPENAT  6   --      Recent Labs      02/20/17 2014 02/21/17 0046   SODIUM  137  139   POTASSIUM  3.8  3.9   CHLORIDE  106  111   CO2  20  21   BUN  31*  31*   CREATININE  1.56*  1.54*   CALCIUM  9.0  8.0*     Recent Labs      02/20/17 2014 02/21/17 0046   ALTSGPT  17   --    ASTSGOT  14   --    ALKPHOSPHAT  63   --    TBILIRUBIN  0.5   --    GLUCOSE  133*  88     Recent Labs      02/20/17 2014 02/21/17 0046  02/21/17   0614   RBC  4.58*  4.06*  4.27*   HEMOGLOBIN  13.2*  11.6*  12.3*   HEMATOCRIT  39.6*  36.2*  38.7*   PLATELETCT  264  213  215   IRON  29*   --    --    FERRITIN   195.6   --    --    [facility]  340   --    --      Recent Labs      17   0046  17   0614   WBC  10.8  10.1  11.0*   NEUTSPOLYS  62.00   --   62.80   LYMPHOCYTES  30.00   --   26.60   MONOCYTES  5.70   --   8.30   EOSINOPHILS  1.40   --   1.50   BASOPHILS  0.50   --   0.50   ASTSGOT  14   --    --    ALTSGPT  17   --    --    ALKPHOSPHAT  63   --    --    TBILIRUBIN  0.5   --    --            Hemodynamics:  Temp (24hrs), Av.2 °C (97.1 °F), Min:35.7 °C (96.3 °F), Max:36.4 °C (97.6 °F)  Temperature: 36.2 °C (97.2 °F)  Pulse  Av.9  Min: 48  Max: 89Heart Rate (Monitored): (!) 57  Blood Pressure: 108/63 mmHg, NIBP: (!) 98/49 mmHg     Respiratory:    Respiration: 18, Pulse Oximetry: 98 %, O2 Daily Delivery Respiratory : Silicone Nasal Cannula     Given By:: Mouthpiece, Work Of Breathing / Effort: Mild  RUL Breath Sounds: Clear, RML Breath Sounds: Clear, RLL Breath Sounds: Clear, FERMIN Breath Sounds: Clear, LLL Breath Sounds: Clear  Fluids:    Intake/Output Summary (Last 24 hours) at 17 1453  Last data filed at 17 1400   Gross per 24 hour   Intake    430 ml   Output   1700 ml   Net  -1270 ml     Weight:  (Pt To Lethargic)  GI/Nutrition:  Orders Placed This Encounter   Procedures   • Diet Order     Standing Status: Standing      Number of Occurrences: 1      Standing Expiration Date:      Order Specific Question:  Diet:     Answer:  2 Gram Sodium [7]     Order Specific Question:  Diet:     Answer:  Renal [8]     Medical Decision Making, by Problem:  Active Hospital Problems    Diagnosis   • Syncope [R55]  Appears to have low BP and low pulse despite IVFs.  Received 4 L in ER on admission and IVFs 125/hr overnight  BP and pulse remain low:  Dc'd minipress  Continue to monitor on telemetry overnight  orhostatic BPs  Hg stable  11 to 13  Ct head -  Echo EF 65%  U/s LE -DVT  Carotids -    Renal insufficiency:  Prior Cr normal:  Now 1.54 despite fluids  dc'd lisinopril on  admission    Iron def anemia:  Hg stable  Check occult stool  Added vit C for increased absorption.     FC, check orthostatic bps, PT/OT ordered.  High risk for repeat syncope given his low BP and pulse.    Yañez catheter: No Yañez      DVT Prophylaxis: Heparin    Ulcer prophylaxis: Not indicated    Assessed for rehab: Patient returned to prior level of function, rehabilitation not indicated at this time

## 2017-02-22 ENCOUNTER — APPOINTMENT (OUTPATIENT)
Dept: RADIOLOGY | Facility: MEDICAL CENTER | Age: 62
DRG: 312 | End: 2017-02-22
Attending: HOSPITALIST

## 2017-02-22 VITALS
WEIGHT: 216.05 LBS | TEMPERATURE: 97.3 F | SYSTOLIC BLOOD PRESSURE: 128 MMHG | BODY MASS INDEX: 26.86 KG/M2 | RESPIRATION RATE: 18 BRPM | DIASTOLIC BLOOD PRESSURE: 68 MMHG | OXYGEN SATURATION: 90 % | HEIGHT: 75 IN | HEART RATE: 80 BPM

## 2017-02-22 PROBLEM — N17.9 ACUTE KIDNEY INJURY (NONTRAUMATIC) (HCC): Status: ACTIVE | Noted: 2017-02-22

## 2017-02-22 PROBLEM — D50.8 IRON DEFICIENCY ANEMIA SECONDARY TO INADEQUATE DIETARY IRON INTAKE: Status: ACTIVE | Noted: 2017-02-22

## 2017-02-22 PROBLEM — I95.9 HYPOTENSION (ARTERIAL): Status: ACTIVE | Noted: 2017-02-22

## 2017-02-22 PROBLEM — R00.1 BRADYCARDIA WITH 51-60 BEATS PER MINUTE: Status: ACTIVE | Noted: 2017-02-22

## 2017-02-22 LAB
ANION GAP SERPL CALC-SCNC: 4 MMOL/L (ref 0–11.9)
BASOPHILS # BLD AUTO: 0.5 % (ref 0–1.8)
BASOPHILS # BLD: 0.04 K/UL (ref 0–0.12)
BUN SERPL-MCNC: 14 MG/DL (ref 8–22)
CALCIUM SERPL-MCNC: 8.9 MG/DL (ref 8.5–10.5)
CHLORIDE SERPL-SCNC: 108 MMOL/L (ref 96–112)
CO2 SERPL-SCNC: 26 MMOL/L (ref 20–33)
CREAT SERPL-MCNC: 1.06 MG/DL (ref 0.5–1.4)
EOSINOPHIL # BLD AUTO: 0.43 K/UL (ref 0–0.51)
EOSINOPHIL NFR BLD: 5 % (ref 0–6.9)
ERYTHROCYTE [DISTWIDTH] IN BLOOD BY AUTOMATED COUNT: 40.4 FL (ref 35.9–50)
GFR SERPL CREATININE-BSD FRML MDRD: >60 ML/MIN/1.73 M 2
GLUCOSE BLD-MCNC: 150 MG/DL (ref 65–99)
GLUCOSE BLD-MCNC: 96 MG/DL (ref 65–99)
GLUCOSE SERPL-MCNC: 152 MG/DL (ref 65–99)
HCT VFR BLD AUTO: 41 % (ref 42–52)
HGB BLD-MCNC: 13.1 G/DL (ref 14–18)
IMM GRANULOCYTES # BLD AUTO: 0.02 K/UL (ref 0–0.11)
IMM GRANULOCYTES NFR BLD AUTO: 0.2 % (ref 0–0.9)
LYMPHOCYTES # BLD AUTO: 1.66 K/UL (ref 1–4.8)
LYMPHOCYTES NFR BLD: 19.5 % (ref 22–41)
MCH RBC QN AUTO: 28.6 PG (ref 27–33)
MCHC RBC AUTO-ENTMCNC: 32 G/DL (ref 33.7–35.3)
MCV RBC AUTO: 89.5 FL (ref 81.4–97.8)
MONOCYTES # BLD AUTO: 0.51 K/UL (ref 0–0.85)
MONOCYTES NFR BLD AUTO: 6 % (ref 0–13.4)
NEUTROPHILS # BLD AUTO: 5.87 K/UL (ref 1.82–7.42)
NEUTROPHILS NFR BLD: 68.8 % (ref 44–72)
NRBC # BLD AUTO: 0 K/UL
NRBC BLD AUTO-RTO: 0 /100 WBC
PLATELET # BLD AUTO: 237 K/UL (ref 164–446)
PMV BLD AUTO: 11.1 FL (ref 9–12.9)
POTASSIUM SERPL-SCNC: 4.6 MMOL/L (ref 3.6–5.5)
RBC # BLD AUTO: 4.58 M/UL (ref 4.7–6.1)
SODIUM SERPL-SCNC: 138 MMOL/L (ref 135–145)
WBC # BLD AUTO: 8.5 K/UL (ref 4.8–10.8)

## 2017-02-22 PROCEDURE — A9270 NON-COVERED ITEM OR SERVICE: HCPCS | Performed by: INTERNAL MEDICINE

## 2017-02-22 PROCEDURE — 700112 HCHG RX REV CODE 229: Performed by: INTERNAL MEDICINE

## 2017-02-22 PROCEDURE — 82962 GLUCOSE BLOOD TEST: CPT | Mod: 91

## 2017-02-22 PROCEDURE — A9270 NON-COVERED ITEM OR SERVICE: HCPCS | Performed by: HOSPITALIST

## 2017-02-22 PROCEDURE — 700102 HCHG RX REV CODE 250 W/ 637 OVERRIDE(OP): Performed by: HOSPITALIST

## 2017-02-22 PROCEDURE — 36415 COLL VENOUS BLD VENIPUNCTURE: CPT

## 2017-02-22 PROCEDURE — 80048 BASIC METABOLIC PNL TOTAL CA: CPT

## 2017-02-22 PROCEDURE — 99239 HOSP IP/OBS DSCHRG MGMT >30: CPT | Performed by: HOSPITALIST

## 2017-02-22 PROCEDURE — 700117 HCHG RX CONTRAST REV CODE 255: Performed by: HOSPITALIST

## 2017-02-22 PROCEDURE — 700102 HCHG RX REV CODE 250 W/ 637 OVERRIDE(OP): Performed by: INTERNAL MEDICINE

## 2017-02-22 PROCEDURE — 700111 HCHG RX REV CODE 636 W/ 250 OVERRIDE (IP): Performed by: INTERNAL MEDICINE

## 2017-02-22 PROCEDURE — 85025 COMPLETE CBC W/AUTO DIFF WBC: CPT

## 2017-02-22 PROCEDURE — 71275 CT ANGIOGRAPHY CHEST: CPT

## 2017-02-22 RX ORDER — BUDESONIDE AND FORMOTEROL FUMARATE DIHYDRATE 160; 4.5 UG/1; UG/1
2 AEROSOL RESPIRATORY (INHALATION)
Status: DISCONTINUED | OUTPATIENT
Start: 2017-02-22 | End: 2017-02-22 | Stop reason: HOSPADM

## 2017-02-22 RX ORDER — ASCORBIC ACID 500 MG
500 TABLET ORAL 3 TIMES DAILY
Qty: 30 TAB | Refills: 3 | Status: SHIPPED | OUTPATIENT
Start: 2017-02-22 | End: 2017-05-03

## 2017-02-22 RX ORDER — ALBUTEROL SULFATE 90 UG/1
2 AEROSOL, METERED RESPIRATORY (INHALATION)
Qty: 8.5 G | Refills: 3 | Status: SHIPPED | OUTPATIENT
Start: 2017-02-22 | End: 2017-05-03

## 2017-02-22 RX ORDER — BUDESONIDE AND FORMOTEROL FUMARATE DIHYDRATE 160; 4.5 UG/1; UG/1
2 AEROSOL RESPIRATORY (INHALATION) 2 TIMES DAILY
Qty: 1 INHALER | Refills: 3 | Status: SHIPPED | OUTPATIENT
Start: 2017-02-22 | End: 2017-05-03

## 2017-02-22 RX ORDER — TIOTROPIUM BROMIDE 18 UG/1
1 CAPSULE ORAL; RESPIRATORY (INHALATION)
Status: DISCONTINUED | OUTPATIENT
Start: 2017-02-22 | End: 2017-02-22 | Stop reason: HOSPADM

## 2017-02-22 RX ORDER — ALBUTEROL SULFATE 90 UG/1
2 AEROSOL, METERED RESPIRATORY (INHALATION)
Status: DISCONTINUED | OUTPATIENT
Start: 2017-02-22 | End: 2017-02-22 | Stop reason: HOSPADM

## 2017-02-22 RX ORDER — FERROUS SULFATE 325(65) MG
325 TABLET ORAL
Qty: 90 TAB | Refills: 3 | Status: SHIPPED | OUTPATIENT
Start: 2017-02-22 | End: 2017-05-03

## 2017-02-22 RX ORDER — TIOTROPIUM BROMIDE 18 UG/1
18 CAPSULE ORAL; RESPIRATORY (INHALATION) DAILY
Qty: 30 CAP | Refills: 3 | Status: SHIPPED | OUTPATIENT
Start: 2017-02-22 | End: 2018-05-02

## 2017-02-22 RX ADMIN — OMEGA-3 FATTY ACIDS CAP 1000 MG 1000 MG: 1000 CAP at 08:24

## 2017-02-22 RX ADMIN — ACETAMINOPHEN 650 MG: 325 TABLET, FILM COATED ORAL at 08:24

## 2017-02-22 RX ADMIN — TIOTROPIUM BROMIDE 1 CAPSULE: 18 CAPSULE ORAL; RESPIRATORY (INHALATION) at 14:11

## 2017-02-22 RX ADMIN — OXYCODONE HYDROCHLORIDE AND ACETAMINOPHEN 500 MG: 500 TABLET ORAL at 08:23

## 2017-02-22 RX ADMIN — FAMOTIDINE 20 MG: 20 TABLET, FILM COATED ORAL at 08:24

## 2017-02-22 RX ADMIN — GABAPENTIN 300 MG: 300 CAPSULE ORAL at 08:23

## 2017-02-22 RX ADMIN — Medication 325 MG: at 14:10

## 2017-02-22 RX ADMIN — OXYCODONE HYDROCHLORIDE AND ACETAMINOPHEN 500 MG: 500 TABLET ORAL at 14:17

## 2017-02-22 RX ADMIN — ALBUTEROL SULFATE 2 PUFF: 90 AEROSOL, METERED RESPIRATORY (INHALATION) at 08:45

## 2017-02-22 RX ADMIN — HEPARIN SODIUM 5000 UNITS: 5000 INJECTION, SOLUTION INTRAVENOUS; SUBCUTANEOUS at 14:17

## 2017-02-22 RX ADMIN — IOHEXOL 75 ML: 350 INJECTION, SOLUTION INTRAVENOUS at 17:08

## 2017-02-22 RX ADMIN — BUDESONIDE AND FORMOTEROL FUMARATE DIHYDRATE 2 PUFF: 160; 4.5 AEROSOL RESPIRATORY (INHALATION) at 14:11

## 2017-02-22 RX ADMIN — GABAPENTIN 300 MG: 300 CAPSULE ORAL at 14:17

## 2017-02-22 RX ADMIN — ALBUTEROL SULFATE 2 PUFF: 90 AEROSOL, METERED RESPIRATORY (INHALATION) at 04:09

## 2017-02-22 RX ADMIN — DOCUSATE SODIUM 100 MG: 100 CAPSULE ORAL at 08:23

## 2017-02-22 RX ADMIN — GLYBURIDE 2.5 MG: 2.5 TABLET ORAL at 08:23

## 2017-02-22 RX ADMIN — Medication 325 MG: at 08:23

## 2017-02-22 RX ADMIN — HEPARIN SODIUM 5000 UNITS: 5000 INJECTION, SOLUTION INTRAVENOUS; SUBCUTANEOUS at 06:02

## 2017-02-22 ASSESSMENT — LIFESTYLE VARIABLES: EVER_SMOKED: NEVER

## 2017-02-22 NOTE — DISCHARGE PLANNING
Care Transition Team Assessment    Information Source  Orientation : Oriented x 4  Information Given By: Patient    Readmission Evaluation  Is this a readmission?: No    Elopement Risk  Legal Hold: No  Ambulatory or Self Mobile in Wheelchair: Yes  Disoriented: No  Psychiatric Symptoms: None  History of Wandering: No  Elopement this Admit: No  Vocalizing Wanting to Leave: No  Displays Behaviors, Body Language Wanting to Leave: No-Not at Risk for Elopement  Elopement Risk: Not at Risk for Elopement    Interdisciplinary Discharge Planning  Does Admitting Nurse Feel This Could be a Complex Discharge?: No  Primary Care Physician: DR CHAN  Lives with - Patient's Self Care Capacity: Spouse  Support Systems: Other (Comments) (NONE)  Housing / Facility: [address]  Do You Take your Prescribed Medications Regularly: Yes  Able to Return to Previous ADL's: Yes  Mobility Issues: Yes  Prior Services: Home-Independent  Patient Expects to be Discharged to:: HOME   Assistance Needed: No  Durable Medical Equipment: Not Applicable    Discharge Preparedness  What is your plan after discharge?: Home with help  What are your discharge supports?: Spouse  Prior Functional Level: Ambulatory, Independent with Activities of Daily Living, Independent with Medication Management  Difficulity with ADLs: None  Difficulity with IADLs: None    Functional Assesment  Prior Functional Level: Ambulatory, Independent with Activities of Daily Living, Independent with Medication Management         Vision / Hearing Impairment  Vision Impairment : Yes  Hearing Impairment : Yes  Hearing Impairment: Both Ears  Does Pt Need Special Equipment for the Hearing Impaired?: No         Advance Directive  Advance Directive?: None    Domestic Abuse  Have you ever been the victim of abuse or violence?: No  Physical Abuse or Sexual Abuse: No  Verbal Abuse or Emotional Abuse: No  Possible Abuse Reported to:: Not Applicable         Discharge Risks or  Barriers  Discharge risks or barriers?: Uninsured / underinsured  Patient risk factors: Uninsured or underinsured    Anticipated Discharge Information  Anticipated discharge disposition: Home

## 2017-02-22 NOTE — DISCHARGE PLANNING
[facility] received a DME choice form. [facility] called left a message for the SW on floor Rena. The DME order is required before the referral can be sent.

## 2017-02-22 NOTE — PROGRESS NOTES
Report received at bedside, assumed care. Pt is resting in bed. A&O x 4. Pt denies pain. No other concerns, complains or distress. Tele box on. Chart reviewed and POC updated with patient. Bed in lowest position and call light within reach.

## 2017-02-22 NOTE — CARE PLAN
Problem: Safety  Goal: Will remain free from injury  Outcome: PROGRESSING AS EXPECTED  Call light within reach, bed alarm in use, bed in lowest position, hourly rounding in practice.    Problem: Venous Thromboembolism (VTW)/Deep Vein Thrombosis (DVT) Prevention:  Goal: Patient will participate in Venous Thrombosis (VTE)/Deep Vein Thrombosis (DVT)Prevention Measures  Outcome: PROGRESSING AS EXPECTED  Heparin given for DVT prevention.    02/21/17 2000   OTHER   Risk Assessment Score 1   VTE RISK Moderate   Mechanical Prophylaxis SCDs, Sequentials (Intermittent Pneumatic Compression Devices)   Pharmacologic Prophylaxis Used Unfractionated Heparin

## 2017-02-22 NOTE — DISCHARGE PLANNING
Care Transition Team Discharge Planning    Anticipated Discharge Information  Anticipated discharge disposition: Home    Care Transition Team Interventions  Were Resources Provided?: Yes  Medical Referrals: DME referral  Internal Referral: None  Medication Authorization: No  [facility] Patient Assistance: DME  Discharge DME arranged: O2 / CPAP / BiPAP (comment) (1 month approved services for O2 portable and concentrator )         Discharge Plan:  Awaiting O2 face to face and order. Priscila doing approved services for O2 for [employer] for $140. Faxed to Callie FERREIRA.     Care Transition Team Final Discharge Disposition    Actual Discharge Information  Actual Discharge Date: 02/22/17  Care Transitions Team Assiting with Transportation: No  Actual Disposition: Discharged to home/self care (01)

## 2017-02-22 NOTE — FACE TO FACE
Face to Face Note  -  Durable Medical Equipment    Kayla Reynolds M.D. - NPI: [id number]  I certify that this patient is under my care and that they had a durable medical equipment(DME)face to face encounter by myself that meets the physician DME face-to-face encounter requirements with this patient on:    Date of encounter:   Patient:                    MRN:                       YOB: 2016  Satya Aguilar  [id number]  1955     The encounter with the patient was in whole, or in part, for the following medical condition, which is the primary reason for durable medical equipment:  COPD    I certify that, based on my findings, the following durable medical equipment is medically necessary:  Oxygen.    HOME O2 Saturation Measurements:(Values must be present for Home Oxygen orders)  Room air sat at rest: 90  Room air sat with amb: 87  With liters of O2: 2, O2 sat at rest with O2: 95  With Liters of O2: 2, O2 sat with amb with O2 : 95  Is the patient mobile?: Yes    My Clinical findings support the need for the above equipment due to:  Hypoxia    Supporting Symptoms: hypoxia on RA at rest and with ambulation, non cigarette smoker, but has smoked THC for years:  Likely COPD, hyperinflation on CXR.

## 2017-02-22 NOTE — DISCHARGE PLANNING
[facility] received DME choice form. Per the choice form the referral has been sent to [facility] approved services form in the amount of $140.00

## 2017-02-23 NOTE — DISCHARGE INSTRUCTIONS
Discharge Instructions    Discharged to home by taxi with self. Discharged via walking, hospital escort: Refused.  Special equipment needed: Oxygen    Be sure to schedule a follow-up appointment with your primary care doctor or any specialists as instructed.     Discharge Plan:   Diet Plan: Discussed  Activity Level: Discussed  Confirmed Follow up Appointment: Patient to Call and Schedule Appointment  Confirmed Symptoms Management: Discussed  Medication Reconciliation Updated: Yes  Influenza Vaccine Indication: Not indicated: Previously immunized this influenza season and > 8 years of age    I understand that a diet low in cholesterol, fat, and sodium is recommended for good health. Unless I have been given specific instructions below for another diet, I accept this instruction as my diet prescription.   Other diet: diabetic    Special Instructions: syncope      · Is patient discharged on Warfarin / Coumadin? No    · Is patient Post Blood Transfusion? No       Depression / Suicide Risk    As you are discharged from this [facility] facility, it is important to learn how to keep safe from harming yourself.    Recognize the warning signs:  · Abrupt changes in personality, positive or negative- including increase in energy   · Giving away possessions  · Change in eating patterns- significant weight changes-  positive or negative  · Change in sleeping patterns- unable to sleep or sleeping all the time   · Unwillingness or inability to communicate  · Depression  · Unusual sadness, discouragement and loneliness  · Talk of wanting to die  · Neglect of personal appearance   · Rebelliousness- reckless behavior  · Withdrawal from people/activities they love  · Confusion- inability to concentrate     If you or a loved one observes any of these behaviors or has concerns about self-harm, here's what you can do:  · Talk about it- your feelings and reasons for harming yourself  · Remove any means that you might use to hurt  yourself (examples: pills, rope, extension cords, firearm)  · Get professional help from the community (Mental Health, Substance Abuse, psychological counseling)  · Do not be alone:Call your Safe Contact- someone whom you trust who will be there for you.  · Call your local CRISIS HOTLINE 022-3260 or 923-648-7542  · Call your local Children's Mobile Crisis Response Team [place] (082) 338-8344 or www.HASH  · Call the toll free National Suicide Prevention Hotlines   · National Suicide Prevention Lifeline 199-229-ILOR (6591)  · National Hope Line Network 800-SUICIDE (606-2922)

## 2017-02-23 NOTE — PROGRESS NOTES
Pt sent for stat CT chest per Dr Reynolds. Advised her that results are back and O2 therapy company here to provide pt home O2..

## 2017-02-23 NOTE — DISCHARGE SUMMARY
PRIMARY CARE PROVIDER:  Mohsen Tamasaby, MD    CODE STATUS:  Full code status.    ALLERGIES:  No known drug allergies.    DISCHARGE MEDICATIONS:  Include home oxygen has been set up for 2 L per minute   nasal cannula with ambulation.  Portable concentrator for hypoxia on room   air.    DISCHARGE MEDICATIONS:  Albuterol inhaler 2 puffs every 4 hours as needed for   shortness of breath, ascorbic acid 500 mg 2 times daily, iron sulfate 325 mg 2   times daily with meals, Spiriva 18 mcg inhalation p.o. daily, Symbicort   160/4.5 two puffs 2 times daily, continue taking Celexa 10 mg p.o. daily, fish   oil 1000 mg 2 times daily, gabapentin 300 mg 2 times daily, glyburide 2.5 mg   2 times daily with meals, Mevacor 20 mg at bedtime, metformin 1000 mg 2 times   daily with meals, and Risperidone 3 mg at bedtime.  Patient also takes   intramuscular risperidone every 21 days.  He is to stop taking his lisinopril   as well as prazosin for bradycardia with hypotension and multiple syncopal   events.    DISCHARGE DIAGNOSES:  1.  Multiple syncopal events 10 times in the last 3 months.  He was found to   have low blood pressure 90/50 with the heart rate of 51.  He was stopped his   lisinopril.  His blood pressure continued to be low.  I also stopped the   Minipress and the patient's blood pressure and pulse were improved by the time   of discharge.  2.  Acute renal insufficiency.  Creatinine was normal, creatinine 1.56.  BUN   31 on admission.  With IV hydration, BUN and creatinine 14 and 1.06 with GFR   greater than 60.  Original GFR was 46.  3.  Iron deficiency anemia.  Patient did not have any obvious blood loss.    Occult blood stool.  Patient did not have a bowel movement.  He was placed on   iron as well as vitamin C to increase absorption.  4.  Likely mild dehydration, received IV fluids 4 L total in ER as well as   overnight.  5.  Likely chronic obstructive pulmonary disease with hyperexpansion seen on   chest x-ray.   However, no formal PFTs had been obtained.  Patient does smoke   THC for several years, no cigarettes; however.  He was found to have hypoxia   with ambulation down to 84% on room air.  He therefore was provided with   oxygen at discharge.  Chest x-ray is negative.    HOSPITAL COURSE:  This 61-year-old male who presents to the ER with syncope   x2.  He has a history of syncope x10 episodes in the last 3-4 months.  It was   thought initially to be dehydration, given 4 L of IV fluids; however, his BP   remained and low pulse rate is low.  He was taken off his lisinopril because   of low blood pressure, kept on Minipress.  I did also take him off Minipress.    Within 48 hours, his blood pressure and pulse improved.  He was normal sinus   rhythm on the monitor.  However, he did remain mildly hypoxic.  Blood pressure   at discharge 115/63 and pulse 74 without any BP meds.  Troponins were   negative.  He was mildly anemic.  Iron levels were noted to be low.    Hemoglobin 11.6 on admission and at discharge 13.1.  Electrolytes are within   normal limits and normal LFTs.  Chest x-ray was negative for infiltrate.  UA   is negative.  Ultrasound of the lower extremity negative for DVT.  Carotids   are negative for occlusion, less than 50% occlusion.  CT of head negative.    Echo showing EF 65% with normal diastolic function.  Patient was discharged to   home in stable condition.    Also of note, D-dimer was slightly elevated at 289.  Therefore, an ultrasound   of lower extremities was performed that was negative.  He had no chest pain.    Chest x-ray was reviewed completely normal.  He is ambulating the halls well   without any difficulties.  He was felt to have a very low likelihood of a PE.    Originally, CTA was not performed due to patient's elevated BUN and   creatinine, they have improved at this time.  I have ordered a stat CTA of the   chest to ensure that there is no PE since he is hypoxic.  This is new onset   for the  patient.  He is otherwise asymptomatic without chest pain, hemoptysis,   cough, shortness of breath, or difficulty breathing ____ CTA of the chest   will be negative.    TOTAL TIME SPENT:  Discharge time is 40 minutes.       ____________________________________     MD ALTA Bruce / ALEX    DD:  02/22/2017 16:03:28  DT:  02/22/2017 16:58:01    D#:  [id number]  Job#:  [id number]

## 2017-03-26 LAB — EKG IMPRESSION: NORMAL

## 2017-04-03 ENCOUNTER — RESOLUTE PROFESSIONAL BILLING HOSPITAL PROF FEE (OUTPATIENT)
Dept: HOSPITALIST | Facility: MEDICAL CENTER | Age: 62
End: 2017-04-03
Payer: COMMERCIAL

## 2017-05-03 ENCOUNTER — APPOINTMENT (OUTPATIENT)
Dept: RADIOLOGY | Facility: MEDICAL CENTER | Age: 62
DRG: 871 | End: 2017-05-03
Attending: EMERGENCY MEDICINE
Payer: COMMERCIAL

## 2017-05-03 ENCOUNTER — APPOINTMENT (OUTPATIENT)
Dept: RADIOLOGY | Facility: MEDICAL CENTER | Age: 62
DRG: 871 | End: 2017-05-03
Attending: INTERNAL MEDICINE
Payer: COMMERCIAL

## 2017-05-03 ENCOUNTER — HOSPITAL ENCOUNTER (INPATIENT)
Facility: MEDICAL CENTER | Age: 62
LOS: 5 days | DRG: 871 | End: 2017-05-08
Attending: EMERGENCY MEDICINE | Admitting: INTERNAL MEDICINE
Payer: COMMERCIAL

## 2017-05-03 DIAGNOSIS — R55 SYNCOPE, UNSPECIFIED SYNCOPE TYPE: ICD-10-CM

## 2017-05-03 DIAGNOSIS — R10.9 ABDOMINAL PAIN, UNSPECIFIED LOCATION: ICD-10-CM

## 2017-05-03 PROBLEM — E87.20 LACTIC ACIDOSIS: Status: ACTIVE | Noted: 2017-05-03

## 2017-05-03 PROBLEM — J44.9 COPD (CHRONIC OBSTRUCTIVE PULMONARY DISEASE) (HCC): Status: ACTIVE | Noted: 2017-05-03

## 2017-05-03 PROBLEM — A41.9 SEPSIS, UNSPECIFIED: Status: ACTIVE | Noted: 2017-05-03

## 2017-05-03 PROBLEM — D72.829 LEUKOCYTOSIS: Status: ACTIVE | Noted: 2017-05-03

## 2017-05-03 PROBLEM — E11.9 DM TYPE 2 (DIABETES MELLITUS, TYPE 2) (HCC): Status: ACTIVE | Noted: 2017-05-03

## 2017-05-03 PROBLEM — K52.9 ENTERITIS: Status: ACTIVE | Noted: 2017-05-03

## 2017-05-03 PROBLEM — E87.5 HYPERKALEMIA: Status: ACTIVE | Noted: 2017-05-03

## 2017-05-03 PROBLEM — E86.9 VOLUME DEPLETION: Status: ACTIVE | Noted: 2017-05-03

## 2017-05-03 LAB
ABO GROUP BLD: NORMAL
ABO GROUP BLD: NORMAL
ALBUMIN SERPL BCP-MCNC: 3.3 G/DL (ref 3.2–4.9)
ALBUMIN/GLOB SERPL: 1.1 G/DL
ALP SERPL-CCNC: 66 U/L (ref 30–99)
ALT SERPL-CCNC: 5 U/L (ref 2–50)
ANION GAP SERPL CALC-SCNC: 9 MMOL/L (ref 0–11.9)
APPEARANCE UR: ABNORMAL
AST SERPL-CCNC: 10 U/L (ref 12–45)
BACTERIA #/AREA URNS HPF: ABNORMAL /HPF
BASOPHILS # BLD AUTO: 0.2 % (ref 0–1.8)
BASOPHILS # BLD AUTO: 0.5 % (ref 0–1.8)
BASOPHILS # BLD: 0.05 K/UL (ref 0–0.12)
BASOPHILS # BLD: 0.11 K/UL (ref 0–0.12)
BILIRUB SERPL-MCNC: 0.5 MG/DL (ref 0.1–1.5)
BILIRUB UR QL STRIP.AUTO: NEGATIVE
BLD GP AB SCN SERPL QL: NORMAL
BNP SERPL-MCNC: 16 PG/ML (ref 0–100)
BUN SERPL-MCNC: 33 MG/DL (ref 8–22)
CALCIUM SERPL-MCNC: 8.6 MG/DL (ref 8.5–10.5)
CHLORIDE SERPL-SCNC: 104 MMOL/L (ref 96–112)
CO2 SERPL-SCNC: 22 MMOL/L (ref 20–33)
COLOR UR: ABNORMAL
CREAT SERPL-MCNC: 2.66 MG/DL (ref 0.5–1.4)
EOSINOPHIL # BLD AUTO: 0.07 K/UL (ref 0–0.51)
EOSINOPHIL # BLD AUTO: 0.19 K/UL (ref 0–0.51)
EOSINOPHIL NFR BLD: 0.3 % (ref 0–6.9)
EOSINOPHIL NFR BLD: 0.8 % (ref 0–6.9)
EPI CELLS #/AREA URNS HPF: ABNORMAL /HPF
ERYTHROCYTE [DISTWIDTH] IN BLOOD BY AUTOMATED COUNT: 41.3 FL (ref 35.9–50)
ERYTHROCYTE [DISTWIDTH] IN BLOOD BY AUTOMATED COUNT: 42.2 FL (ref 35.9–50)
GFR SERPL CREATININE-BSD FRML MDRD: 25 ML/MIN/1.73 M 2
GLOBULIN SER CALC-MCNC: 2.9 G/DL (ref 1.9–3.5)
GLUCOSE BLD-MCNC: 104 MG/DL (ref 65–99)
GLUCOSE SERPL-MCNC: 117 MG/DL (ref 65–99)
GLUCOSE UR STRIP.AUTO-MCNC: NEGATIVE MG/DL
HCT VFR BLD AUTO: 44.1 % (ref 42–52)
HCT VFR BLD AUTO: 44.1 % (ref 42–52)
HGB BLD-MCNC: 13.9 G/DL (ref 14–18)
HGB BLD-MCNC: 14.2 G/DL (ref 14–18)
IMM GRANULOCYTES # BLD AUTO: 0.16 K/UL (ref 0–0.11)
IMM GRANULOCYTES # BLD AUTO: 0.18 K/UL (ref 0–0.11)
IMM GRANULOCYTES NFR BLD AUTO: 0.6 % (ref 0–0.9)
IMM GRANULOCYTES NFR BLD AUTO: 0.7 % (ref 0–0.9)
INR PPP: 0.98 (ref 0.87–1.13)
KETONES UR STRIP.AUTO-MCNC: NEGATIVE MG/DL
LACTATE BLD-SCNC: 1.4 MMOL/L (ref 0.5–2)
LACTATE BLD-SCNC: 2.4 MMOL/L (ref 0.5–2)
LEUKOCYTE ESTERASE UR QL STRIP.AUTO: NEGATIVE
LIPASE SERPL-CCNC: 49 U/L (ref 11–82)
LYMPHOCYTES # BLD AUTO: 1.86 K/UL (ref 1–4.8)
LYMPHOCYTES # BLD AUTO: 2.3 K/UL (ref 1–4.8)
LYMPHOCYTES NFR BLD: 7.6 % (ref 22–41)
LYMPHOCYTES NFR BLD: 9.3 % (ref 22–41)
MCH RBC QN AUTO: 28.4 PG (ref 27–33)
MCH RBC QN AUTO: 28.5 PG (ref 27–33)
MCHC RBC AUTO-ENTMCNC: 31.5 G/DL (ref 33.7–35.3)
MCHC RBC AUTO-ENTMCNC: 32.2 G/DL (ref 33.7–35.3)
MCV RBC AUTO: 88.6 FL (ref 81.4–97.8)
MCV RBC AUTO: 90.2 FL (ref 81.4–97.8)
MICRO URNS: ABNORMAL
MONOCYTES # BLD AUTO: 0.9 K/UL (ref 0–0.85)
MONOCYTES # BLD AUTO: 0.93 K/UL (ref 0–0.85)
MONOCYTES NFR BLD AUTO: 3.7 % (ref 0–13.4)
MONOCYTES NFR BLD AUTO: 3.8 % (ref 0–13.4)
MUCOUS THREADS #/AREA URNS HPF: ABNORMAL /HPF
NEUTROPHILS # BLD AUTO: 21.11 K/UL (ref 1.82–7.42)
NEUTROPHILS # BLD AUTO: 21.12 K/UL (ref 1.82–7.42)
NEUTROPHILS NFR BLD: 85.8 % (ref 44–72)
NEUTROPHILS NFR BLD: 86.7 % (ref 44–72)
NITRITE UR QL STRIP.AUTO: NEGATIVE
NRBC # BLD AUTO: 0 K/UL
NRBC # BLD AUTO: 0 K/UL
NRBC BLD AUTO-RTO: 0 /100 WBC
NRBC BLD AUTO-RTO: 0 /100 WBC
PH UR STRIP.AUTO: 5.5 [PH]
PLATELET # BLD AUTO: 314 K/UL (ref 164–446)
PLATELET # BLD AUTO: 323 K/UL (ref 164–446)
PMV BLD AUTO: 10.5 FL (ref 9–12.9)
PMV BLD AUTO: 10.6 FL (ref 9–12.9)
POTASSIUM SERPL-SCNC: 6.3 MMOL/L (ref 3.6–5.5)
PROT SERPL-MCNC: 6.2 G/DL (ref 6–8.2)
PROT UR QL STRIP: 30 MG/DL
PROTHROMBIN TIME: 13.3 SEC (ref 12–14.6)
RBC # BLD AUTO: 4.89 M/UL (ref 4.7–6.1)
RBC # BLD AUTO: 4.98 M/UL (ref 4.7–6.1)
RBC # URNS HPF: ABNORMAL /HPF
RBC UR QL AUTO: NEGATIVE
RH BLD: NORMAL
SODIUM SERPL-SCNC: 135 MMOL/L (ref 135–145)
SP GR UR STRIP.AUTO: >1.035
TROPONIN I SERPL-MCNC: <0.01 NG/ML (ref 0–0.04)
WBC # BLD AUTO: 24.4 K/UL (ref 4.8–10.8)
WBC # BLD AUTO: 24.6 K/UL (ref 4.8–10.8)
WBC #/AREA URNS HPF: ABNORMAL /HPF

## 2017-05-03 PROCEDURE — 700105 HCHG RX REV CODE 258

## 2017-05-03 PROCEDURE — 700117 HCHG RX CONTRAST REV CODE 255: Performed by: EMERGENCY MEDICINE

## 2017-05-03 PROCEDURE — 71275 CT ANGIOGRAPHY CHEST: CPT

## 2017-05-03 PROCEDURE — 83690 ASSAY OF LIPASE: CPT

## 2017-05-03 PROCEDURE — 700105 HCHG RX REV CODE 258: Performed by: EMERGENCY MEDICINE

## 2017-05-03 PROCEDURE — 770022 HCHG ROOM/CARE - ICU (200)

## 2017-05-03 PROCEDURE — 71010 DX-CHEST-PORTABLE (1 VIEW): CPT

## 2017-05-03 PROCEDURE — 85610 PROTHROMBIN TIME: CPT

## 2017-05-03 PROCEDURE — 36415 COLL VENOUS BLD VENIPUNCTURE: CPT

## 2017-05-03 PROCEDURE — 83605 ASSAY OF LACTIC ACID: CPT | Mod: 91

## 2017-05-03 PROCEDURE — 80053 COMPREHEN METABOLIC PANEL: CPT | Mod: 91

## 2017-05-03 PROCEDURE — 96365 THER/PROPH/DIAG IV INF INIT: CPT

## 2017-05-03 PROCEDURE — 93005 ELECTROCARDIOGRAM TRACING: CPT | Performed by: EMERGENCY MEDICINE

## 2017-05-03 PROCEDURE — 84484 ASSAY OF TROPONIN QUANT: CPT

## 2017-05-03 PROCEDURE — 96361 HYDRATE IV INFUSION ADD-ON: CPT

## 2017-05-03 PROCEDURE — 304561 HCHG STAT O2

## 2017-05-03 PROCEDURE — 83880 ASSAY OF NATRIURETIC PEPTIDE: CPT

## 2017-05-03 PROCEDURE — 82962 GLUCOSE BLOOD TEST: CPT

## 2017-05-03 PROCEDURE — 99285 EMERGENCY DEPT VISIT HI MDM: CPT

## 2017-05-03 PROCEDURE — 87040 BLOOD CULTURE FOR BACTERIA: CPT

## 2017-05-03 PROCEDURE — 86900 BLOOD TYPING SEROLOGIC ABO: CPT

## 2017-05-03 PROCEDURE — 700111 HCHG RX REV CODE 636 W/ 250 OVERRIDE (IP): Performed by: EMERGENCY MEDICINE

## 2017-05-03 PROCEDURE — 72125 CT NECK SPINE W/O DYE: CPT

## 2017-05-03 PROCEDURE — 86901 BLOOD TYPING SEROLOGIC RH(D): CPT

## 2017-05-03 PROCEDURE — 99291 CRITICAL CARE FIRST HOUR: CPT | Performed by: INTERNAL MEDICINE

## 2017-05-03 PROCEDURE — 85025 COMPLETE CBC W/AUTO DIFF WBC: CPT | Mod: 91

## 2017-05-03 PROCEDURE — 81001 URINALYSIS AUTO W/SCOPE: CPT

## 2017-05-03 PROCEDURE — 70450 CT HEAD/BRAIN W/O DYE: CPT

## 2017-05-03 PROCEDURE — 86850 RBC ANTIBODY SCREEN: CPT

## 2017-05-03 PROCEDURE — 700111 HCHG RX REV CODE 636 W/ 250 OVERRIDE (IP): Performed by: INTERNAL MEDICINE

## 2017-05-03 RX ORDER — RISPERIDONE 1 MG/1
1 TABLET ORAL
Status: ON HOLD | COMMUNITY
End: 2017-05-08

## 2017-05-03 RX ORDER — SODIUM CHLORIDE 9 MG/ML
30 INJECTION, SOLUTION INTRAVENOUS ONCE
Status: COMPLETED | OUTPATIENT
Start: 2017-05-03 | End: 2017-05-04

## 2017-05-03 RX ORDER — GABAPENTIN 400 MG/1
300 CAPSULE ORAL 3 TIMES DAILY
COMMUNITY
End: 2017-05-31

## 2017-05-03 RX ORDER — DEXTROSE MONOHYDRATE 25 G/50ML
25 INJECTION, SOLUTION INTRAVENOUS
Status: DISCONTINUED | OUTPATIENT
Start: 2017-05-03 | End: 2017-05-08 | Stop reason: HOSPADM

## 2017-05-03 RX ORDER — BISACODYL 10 MG
10 SUPPOSITORY, RECTAL RECTAL
Status: DISCONTINUED | OUTPATIENT
Start: 2017-05-04 | End: 2017-05-08 | Stop reason: HOSPADM

## 2017-05-03 RX ORDER — BUDESONIDE AND FORMOTEROL FUMARATE DIHYDRATE 160; 4.5 UG/1; UG/1
2 AEROSOL RESPIRATORY (INHALATION) 2 TIMES DAILY
Status: DISCONTINUED | OUTPATIENT
Start: 2017-05-03 | End: 2017-05-03

## 2017-05-03 RX ORDER — TIOTROPIUM BROMIDE 18 UG/1
1 CAPSULE ORAL; RESPIRATORY (INHALATION) DAILY
Status: DISCONTINUED | OUTPATIENT
Start: 2017-05-04 | End: 2017-05-08 | Stop reason: HOSPADM

## 2017-05-03 RX ORDER — LOVASTATIN 40 MG/1
40 TABLET ORAL NIGHTLY
COMMUNITY
End: 2017-05-31 | Stop reason: SDUPTHER

## 2017-05-03 RX ORDER — ONDANSETRON 2 MG/ML
4 INJECTION INTRAMUSCULAR; INTRAVENOUS EVERY 4 HOURS PRN
Status: DISCONTINUED | OUTPATIENT
Start: 2017-05-03 | End: 2017-05-08 | Stop reason: HOSPADM

## 2017-05-03 RX ORDER — OXYCODONE HYDROCHLORIDE 5 MG/1
5 TABLET ORAL
Status: DISCONTINUED | OUTPATIENT
Start: 2017-05-03 | End: 2017-05-08 | Stop reason: HOSPADM

## 2017-05-03 RX ORDER — GABAPENTIN 400 MG/1
400 CAPSULE ORAL 3 TIMES DAILY
Status: DISCONTINUED | OUTPATIENT
Start: 2017-05-03 | End: 2017-05-08 | Stop reason: HOSPADM

## 2017-05-03 RX ORDER — OXYCODONE HYDROCHLORIDE 10 MG/1
10 TABLET ORAL
Status: DISCONTINUED | OUTPATIENT
Start: 2017-05-03 | End: 2017-05-08 | Stop reason: HOSPADM

## 2017-05-03 RX ORDER — AMOXICILLIN 250 MG
2 CAPSULE ORAL 2 TIMES DAILY
Status: DISCONTINUED | OUTPATIENT
Start: 2017-05-04 | End: 2017-05-08 | Stop reason: HOSPADM

## 2017-05-03 RX ORDER — MORPHINE SULFATE 4 MG/ML
4 INJECTION, SOLUTION INTRAMUSCULAR; INTRAVENOUS
Status: DISCONTINUED | OUTPATIENT
Start: 2017-05-03 | End: 2017-05-08 | Stop reason: HOSPADM

## 2017-05-03 RX ORDER — CITALOPRAM 20 MG/1
10 TABLET ORAL DAILY
Status: DISCONTINUED | OUTPATIENT
Start: 2017-05-04 | End: 2017-05-08 | Stop reason: HOSPADM

## 2017-05-03 RX ORDER — RISPERIDONE 3 MG/1
3 TABLET ORAL EVERY EVENING
Status: DISCONTINUED | OUTPATIENT
Start: 2017-05-03 | End: 2017-05-08 | Stop reason: HOSPADM

## 2017-05-03 RX ORDER — SODIUM CHLORIDE 9 MG/ML
INJECTION, SOLUTION INTRAVENOUS
Status: COMPLETED
Start: 2017-05-03 | End: 2017-05-03

## 2017-05-03 RX ORDER — SODIUM CHLORIDE 9 MG/ML
1000 INJECTION, SOLUTION INTRAVENOUS
Status: COMPLETED | OUTPATIENT
Start: 2017-05-03 | End: 2017-05-03

## 2017-05-03 RX ORDER — SODIUM CHLORIDE 9 MG/ML
1000 INJECTION, SOLUTION INTRAVENOUS ONCE
Status: COMPLETED | OUTPATIENT
Start: 2017-05-03 | End: 2017-05-03

## 2017-05-03 RX ORDER — HEPARIN SODIUM 5000 [USP'U]/ML
5000 INJECTION, SOLUTION INTRAVENOUS; SUBCUTANEOUS EVERY 8 HOURS
Status: DISCONTINUED | OUTPATIENT
Start: 2017-05-04 | End: 2017-05-05

## 2017-05-03 RX ORDER — ONDANSETRON 4 MG/1
4 TABLET, ORALLY DISINTEGRATING ORAL EVERY 4 HOURS PRN
Status: DISCONTINUED | OUTPATIENT
Start: 2017-05-03 | End: 2017-05-08 | Stop reason: HOSPADM

## 2017-05-03 RX ORDER — PROMETHAZINE HYDROCHLORIDE 25 MG/1
12.5-25 SUPPOSITORY RECTAL EVERY 4 HOURS PRN
Status: DISCONTINUED | OUTPATIENT
Start: 2017-05-03 | End: 2017-05-08 | Stop reason: HOSPADM

## 2017-05-03 RX ORDER — NOREPINEPHRINE BITARTRATE 1 MG/ML
INJECTION, SOLUTION INTRAVENOUS
Status: COMPLETED
Start: 2017-05-03 | End: 2017-05-04

## 2017-05-03 RX ORDER — SODIUM CHLORIDE 9 MG/ML
30 INJECTION, SOLUTION INTRAVENOUS
Status: DISCONTINUED | OUTPATIENT
Start: 2017-05-03 | End: 2017-05-08 | Stop reason: HOSPADM

## 2017-05-03 RX ORDER — ESMOLOL HYDROCHLORIDE 10 MG/ML
INJECTION, SOLUTION INTRAVENOUS
Status: DISCONTINUED
Start: 2017-05-03 | End: 2017-05-03

## 2017-05-03 RX ORDER — GEMFIBROZIL 600 MG/1
600 TABLET, FILM COATED ORAL 2 TIMES DAILY
COMMUNITY
End: 2018-05-02

## 2017-05-03 RX ORDER — PROMETHAZINE HYDROCHLORIDE 25 MG/1
12.5-25 TABLET ORAL EVERY 4 HOURS PRN
Status: DISCONTINUED | OUTPATIENT
Start: 2017-05-03 | End: 2017-05-08 | Stop reason: HOSPADM

## 2017-05-03 RX ORDER — LOVASTATIN 20 MG/1
40 TABLET ORAL NIGHTLY
Status: DISCONTINUED | OUTPATIENT
Start: 2017-05-03 | End: 2017-05-08 | Stop reason: HOSPADM

## 2017-05-03 RX ORDER — ALBUTEROL SULFATE 90 UG/1
2 AEROSOL, METERED RESPIRATORY (INHALATION)
Status: DISCONTINUED | OUTPATIENT
Start: 2017-05-03 | End: 2017-05-08 | Stop reason: HOSPADM

## 2017-05-03 RX ORDER — POLYETHYLENE GLYCOL 3350 17 G/17G
1 POWDER, FOR SOLUTION ORAL
Status: DISCONTINUED | OUTPATIENT
Start: 2017-05-04 | End: 2017-05-08 | Stop reason: HOSPADM

## 2017-05-03 RX ADMIN — SODIUM CHLORIDE 1000 ML: 9 INJECTION, SOLUTION INTRAVENOUS at 18:55

## 2017-05-03 RX ADMIN — PIPERACILLIN SODIUM AND TAZOBACTAM SODIUM 3.38 G: 3; .375 INJECTION, POWDER, FOR SOLUTION INTRAVENOUS at 20:29

## 2017-05-03 RX ADMIN — SODIUM CHLORIDE 1000 ML: 9 INJECTION, SOLUTION INTRAVENOUS at 20:00

## 2017-05-03 RX ADMIN — SODIUM CHLORIDE: 9 INJECTION, SOLUTION INTRAVENOUS at 23:15

## 2017-05-03 RX ADMIN — IOHEXOL 100 ML: 350 INJECTION, SOLUTION INTRAVENOUS at 19:34

## 2017-05-03 ASSESSMENT — PAIN SCALES - GENERAL
PAINLEVEL_OUTOF10: 3
PAINLEVEL_OUTOF10: 10

## 2017-05-03 ASSESSMENT — ENCOUNTER SYMPTOMS
SHORTNESS OF BREATH: 0
VOMITING: 0
NAUSEA: 1
DIARRHEA: 0
FALLS: 1
ABDOMINAL PAIN: 1
DIZZINESS: 1
NECK PAIN: 1
CHILLS: 0

## 2017-05-03 ASSESSMENT — LIFESTYLE VARIABLES: DO YOU DRINK ALCOHOL: NO

## 2017-05-03 NOTE — IP AVS SNAPSHOT
5/8/2017    Satya Aguilar  [address]  [place] [address]    Dear Satya:    [facility] wants to ensure your discharge home is safe and you or your loved ones have had all of your questions answered regarding your care after you leave the hospital.    Below is a list of resources and contact information should you have any questions regarding your hospital stay, follow-up instructions, or active medical symptoms.    Questions or Concerns Regarding… Contact   Medical Questions Related to Your Discharge  (7 days a week, 8am-5pm) Contact a Nurse Care Coordinator   [phone]   Medical Questions Not Related to Your Discharge  (24 hours a day / 7 days a week)  Contact the Nurse Health Line   [phone]    Medications or Discharge Instructions Refer to your discharge packet   or contact your [facility] Primary Care Provider   [phone]   Follow-up Appointment(s) Schedule your appointment via [employer]   or contact Scheduling [phone]   Billing Review your statement via [employer]  or contact Billing [phone]   Medical Records Review your records via [employer]   or contact Medical Records [phone]     You may receive a telephone call within two days of discharge. This call is to make certain you understand your discharge instructions and have the opportunity to have any questions answered. You can also easily access your medical information, test results and upcoming appointments via the [employer] free online health management tool. You can learn more and sign up at [employer]. For assistance setting up your [employer] account, please call [phone].    Once again, we want to ensure your discharge home is safe and that you have a clear understanding of any next steps in your care. If you have any questions or concerns, please do not hesitate to contact us, we are here for you. Thank you for choosing [facility] for your healthcare needs.    Sincerely,    Your [facility] Healthcare Team

## 2017-05-03 NOTE — IP AVS SNAPSHOT
Home Care Instructions                                                                                                                  Name:Satya Aguilar  Medical Record Number:[id number]  CSN: [id number]    YOB: 1955   Age: 61 y.o.  Sex: male  HT:1.829 m (6') WT: 98.6 kg (217 lb 6 oz)          Admit Date: 5/3/2017     Discharge Date:   Today's Date: 5/8/2017  Attending Doctor:  Nathalie Licona M.D.                  Allergies:  Review of patient's allergies indicates no known allergies.            Discharge Instructions       Discharge Instructions    Discharged to home by car with relative. Discharged via wheelchair, hospital escort: Yes.  Special equipment needed: Not Applicable    Be sure to schedule a follow-up appointment with your primary care doctor or any specialists as instructed.     Discharge Plan:   Diet Plan: Discussed  Activity Level: Discussed  Smoking Cessation Offered:  (Daily [facility] smoker)  Confirmed Follow up Appointment: Appointment Scheduled  Confirmed Symptoms Management: Discussed  Medication Reconciliation Updated: Yes  Influenza Vaccine Indication: Not indicated: Previously immunized this influenza season and > 8 years of age    I understand that a diet low in cholesterol, fat, and sodium is recommended for good health. Unless I have been given specific instructions below for another diet, I accept this instruction as my diet prescription.   Other diet: Heart Healthy, Diabetic    Special Instructions: None    · Is patient discharged on Warfarin / Coumadin?   No     · Is patient Post Blood Transfusion?  No    Depression / Suicide Risk    As you are discharged from this [facility] facility, it is important to learn how to keep safe from harming yourself.    Recognize the warning signs:  · Abrupt changes in personality, positive or negative- including increase in energy   · Giving away possessions  · Change in eating patterns- significant weight changes-  positive or  negative  · Change in sleeping patterns- unable to sleep or sleeping all the time   · Unwillingness or inability to communicate  · Depression  · Unusual sadness, discouragement and loneliness  · Talk of wanting to die  · Neglect of personal appearance   · Rebelliousness- reckless behavior  · Withdrawal from people/activities they love  · Confusion- inability to concentrate     If you or a loved one observes any of these behaviors or has concerns about self-harm, here's what you can do:  · Talk about it- your feelings and reasons for harming yourself  · Remove any means that you might use to hurt yourself (examples: pills, rope, extension cords, firearm)  · Get professional help from the community (Mental Health, Substance Abuse, psychological counseling)  · Do not be alone:Call your Safe Contact- someone whom you trust who will be there for you.  · Call your local CRISIS HOTLINE 156-1976 or 609-710-4032  · Call your local Children's Mobile Crisis Response Team [place] (969) 427-6347 or wwwShopCity.com  · Call the toll free National Suicide Prevention Hotlines   · National Suicide Prevention Lifeline 594-572-OHRF (9230)  · National Hope Line Network 800-SUICIDE (854-9144)    Rivaroxaban oral tablets  What is this medicine?  RIVAROXABAN (ri va COLEEN a ban) is an anticoagulant (blood thinner). It is used to treat blood clots in the lungs or in the veins. It is also used after knee or hip surgeries to prevent blood clots. It is also used to lower the chance of stroke in people with a medical condition called atrial fibrillation.  This medicine may be used for other purposes; ask your health care provider or pharmacist if you have questions.  COMMON BRAND NAME(S): Xarelto  What should I tell my health care provider before I take this medicine?  They need to know if you have any of these conditions:  -bleeding disorders  -bleeding in the brain  -blood in your stools (black or tarry stools) or if you have blood in  your vomit  -history of stomach bleeding  -kidney disease  -liver disease  -low blood counts, like low white cell, platelet, or red cell counts  -recent or planned spinal or epidural procedure  -take medicines that treat or prevent blood clots  -an unusual or allergic reaction to rivaroxaban, other medicines, foods, dyes, or preservatives  -pregnant or trying to get pregnant  -breast-feeding  How should I use this medicine?  Take this medicine by mouth with a glass of water. Follow the directions on the prescription label. Take your medicine at regular intervals. Do not take it more often than directed. Do not stop taking except on your doctor's advice.  If you are taking this medicine after hip or knee replacement surgery, take it with or without food.  If you are taking this medicine for atrial fibrillation, take it with your evening meal. If you are taking this medicine to treat blood clots, take it with food at the same time each day. If you are unable to swallow your tablet, you may crush the tablet and mix it in applesauce. Then, immediately eat the applesauce. You should eat more food right after you eat the applesauce containing the crushed tablet.  Talk to your pediatrician regarding the use of this medicine in children. Special care may be needed.  Overdosage: If you think you've taken too much of this medicine contact a poison control center or emergency room at once.  Overdosage: If you think you have taken too much of this medicine contact a poison control center or emergency room at once.  NOTE: This medicine is only for you. Do not share this medicine with others.  What if I miss a dose?  If you take your medicine once a day and miss a dose, take the missed dose as soon as you remember. If you take your medicine twice a day and miss a dose, take the missed dose immediately. In this instance, 2 tablets may be taken at the same time. The next day you should take 1 tablet twice a day as directed.  What  may interact with this medicine?  -aspirin and aspirin-like medicines  -certain antibiotics like erythromycin, azithromycin, and clarithromycin  -certain medicines for fungal infections like ketoconazole and itraconazole  -certain medicines for irregular heart beat like amiodarone, quinidine, dronedarone  -certain medicines for seizures like carbamazepine, phenytoin  -certain medicines that treat or prevent blood clots like warfarin, enoxaparin, and dalteparin   -conivaptan  -diltiazem  -felodipine  -indinavir  -lopinavir; ritonavir  -NSAIDS, medicines for pain and inflammation, like ibuprofen or naproxen  -ranolazine  -rifampin  -ritonavir  -Shandra's wort  -verapamil  This list may not describe all possible interactions. Give your health care provider a list of all the medicines, herbs, non-prescription drugs, or dietary supplements you use. Also tell them if you smoke, drink alcohol, or use illegal drugs. Some items may interact with your medicine.  What should I watch for while using this medicine?  Do not stop taking this medicine without first talking to your doctor. Stopping this medicine may increase your risk of having a stroke. Be sure to refill your prescription before you run out of medicine.  This medicine may increase your risk to bruise or bleed. Call your doctor or health care professional if you notice any unusual bleeding.  Be careful brushing and flossing your teeth or using a toothpick because you may bleed more easily. If you have any dental work done, tell your dentist you are receiving this medicine.  What side effects may I notice from receiving this medicine?  Side effects that you should report to your doctor or health care professional as soon as possible:  -allergic reactions like skin rash, itching or hives, swelling of the face, lips, or tongue  -back pain  -bloody or black, tarry stools  -changes in vision  -confusion, trouble speaking or understanding  -red or dark-brown  urine  -redness, blistering, peeling or loosening of the skin, including inside the mouth  -severe headaches  -spitting up blood or brown material that looks like coffee grounds  -sudden numbness or weakness of the face, arm or leg  -trouble walking, dizziness, loss of balance or coordination  -unusual bruising or bleeding from the eye, gums, or nose   Side effects that usually do not require medical attention (Report these to your doctor or health care professional if they continue or are bothersome.):  -dizziness  -muscle pain  This list may not describe all possible side effects. Call your doctor for medical advice about side effects. You may report side effects to FDA at 4-672-CNX-2082.  Where should I keep my medicine?  Keep out of the reach of children.  Store at room temperature between 15 and 30 degrees C (59 and 86 degrees F). Throw away any unused medicine after the expiration date.  NOTE: This sheet is a summary. It may not cover all possible information. If you have questions about this medicine, talk to your doctor, pharmacist, or health care provider.  © 2014, Elsevier/Gold Standard. (3/17/2014 3:32:09 PM)    Atrial Fibrillation  Atrial fibrillation is a condition that causes your heart to beat irregularly. It may also cause your heart to beat faster than normal. Atrial fibrillation can prevent your heart from pumping blood normally. It increases your risk of stroke and heart problems.  HOME CARE  · Take medications as told by your doctor.  · Only take medications that your doctor says are safe. Some medications can make the condition worse or happen again.  · If blood thinners were prescribed by your doctor, take them exactly as told. Too much can cause bleeding. Too little and you will not have the needed protection against stroke and other problems.  · Perform blood tests at home if told by your doctor.  · Perform blood tests exactly as told by your doctor.  · Do not drink alcohol.  · Do not drink  beverages with caffeine such as coffee, soda, and some teas.  · Maintain a healthy weight.  · Do not use diet pills unless your doctor says they are safe. They may make heart problems worse.  · Follow diet instructions as told by your doctor.  · Exercise regularly as told by your doctor.  · Keep all follow-up appointments.  GET HELP IF:  · You notice a change in the speed, rhythm, or strength of your heartbeat.  · You suddenly begin peeing (urinating) more often.  · You get tired more easily when moving or exercising.  GET HELP RIGHT AWAY IF:   · You have chest or belly (abdominal) pain.  · You feel sick to your stomach (nauseous).  · You are short of breath.  · You suddenly have swollen feet and ankles.  · You feel dizzy.  · You face, arms, or legs feel numb or weak.  · There is a change in your vision or speech.  MAKE SURE YOU:   · Understand these instructions.  · Will watch your condition.  · Will get help right away if you are not doing well or get worse.     This information is not intended to replace advice given to you by your health care provider. Make sure you discuss any questions you have with your health care provider.     Document Released: 09/26/2009 Document Revised: 01/08/2016 Document Reviewed: 04/13/2016  [employer] Interactive Patient Education ©2016 [employer]    Dehydration, Adult  Dehydration means your body does not have as much fluid as it needs. Your kidneys, brain, and heart will not work properly without the right amount of fluids and salt.   HOME CARE  · Ask your doctor how to replace body fluid losses (rehydrate).  · Drink enough fluids to keep your pee (urine) clear or pale yellow.  · Drink small amounts of fluids often if you feel sick to your stomach (nauseous) or throw up (vomit).  · Eat like you normally do.  · Avoid:  ¨ Foods or drinks high in sugar.  ¨ Bubbly (carbonated) drinks.  ¨ Juice.  ¨ Very hot or cold fluids.  ¨ Drinks with caffeine.  ¨ Fatty, greasy  foods.  ¨ Alcohol.  ¨ Tobacco.  ¨ Eating too much.  ¨ Gelatin desserts.  · Wash your hands to avoid spreading germs (bacteria, viruses).  · Only take medicine as told by your doctor.  · Keep all doctor visits as told.  GET HELP RIGHT AWAY IF:   · You cannot drink something without throwing up.  · You get worse even with treatment.  · Your vomit has blood in it or looks greenish.  · Your poop (stool) has blood in it or looks black and tarry.  · You have not peed in 6 to 8 hours.  · You pee a small amount of very dark pee.  · You have a fever.  · You pass out (faint).  · You have belly (abdominal) pain that gets worse or stays in one spot (localizes).  · You have a rash, stiff neck, or bad headache.  · You get easily annoyed, sleepy, or are hard to wake up.  · You feel weak, dizzy, or very thirsty.  MAKE SURE YOU:   · Understand these instructions.  · Will watch your condition.  · Will get help right away if you are not doing well or get worse.     This information is not intended to replace advice given to you by your health care provider. Make sure you discuss any questions you have with your health care provider.     Document Released: 10/14/2010 Document Revised: 03/11/2013 Document Reviewed: 08/06/2012  [employer] Interactive Patient Education ©2016 [employer] Inc.      Your appointments     May 31, 2017  2:40 PM   HOSPITAL FOLLOW UP with ABBIE Liu   [facility] (--)    [address] [place] [address] [address]  [place] [address]   [phone]                 Discharge Medication Instructions:    Below are the medications your physician expects you to take upon discharge:    Review all your home medications and newly ordered medications with your doctor and/or pharmacist. Follow medication instructions as directed by your doctor and/or pharmacist.    Please keep your medication list with you and share with your physician.               Medication List      START taking these  medications        Instructions    Morning Afternoon Evening Bedtime    amiodarone 400 MG tablet   Last time this was given:  400 mg on 5/8/2017  8:22 AM   Commonly known as:  PACERONE   Next Dose Due:  9PM        Take 1 Tab by mouth 2 Times a Day.   Dose:  400 mg                        cephALEXin 250 MG Caps   Commonly known as:  KEFLEX   Next Dose Due:  830 pm        Take 1 Cap by mouth 4 times a day for 3 days.   Dose:  250 mg                        metoprolol 25 MG Tabs   Last time this was given:  25 mg on 5/8/2017  8:23 AM   Commonly known as:  LOPRESSOR   Next Dose Due:  9pm        Take 1 Tab by mouth 2 Times a Day.   Dose:  25 mg                        rivaroxaban 20 MG Tabs tablet   Commonly known as:  XARELTO   Next Dose Due:  With dinner        Take 1 Tab by mouth with dinner.   Dose:  20 mg                          CHANGE how you take these medications        Instructions    Morning Afternoon Evening Bedtime    gabapentin 400 MG Caps   What changed:  Another medication with the same name was removed. Continue taking this medication, and follow the directions you see here.   Last time this was given:  400 mg on 5/8/2017  2:42 PM   Commonly known as:  NEURONTIN   Next Dose Due:  9pm        Take 400 mg by mouth 3 times a day.   Dose:  400 mg                        risperidone 3 MG Tabs   What changed:  Another medication with the same name was removed. Continue taking this medication, and follow the directions you see here.   Last time this was given:  3 mg on 5/7/2017 10:43 PM   Commonly known as:  RISPERDAL   Next Dose Due:  9pm        Take 3 mg by mouth every evening.   Dose:  3 mg                          CONTINUE taking these medications        Instructions    Morning Afternoon Evening Bedtime    albuterol 108 (90 BASE) MCG/ACT Aers inhalation aerosol   Last time this was given:  2 Puffs on 5/7/2017  5:02 AM   Next Dose Due:  As needed          Inhale 2 Puffs by mouth every 1 hour as needed.   Dose:  2  Pumary                        aspirin 81 MG tablet   Next Dose Due:  Tomorrow AM        Take 81 mg by mouth every day.   Dose:  81 mg                        citalopram 10 MG tablet   Last time this was given:  10 mg on 5/8/2017  8:22 AM   Commonly known as:  CELEXA   Next Dose Due:  Tomorrow AM        Take 10 mg by mouth every day.   Dose:  10 mg                        gemfibrozil 600 MG Tabs   Commonly known as:  LOPID   Next Dose Due:  9pm        Take 600 mg by mouth 2 times a day.   Dose:  600 mg                        glyBURIDE 2.5 MG Tabs   Commonly known as:  DIABETA   Next Dose Due:  Tomorrow AM        Take 2.5 mg by mouth every morning with breakfast.   Dose:  2.5 mg                        lovastatin 40 MG tablet   Last time this was given:  40 mg on 5/7/2017  9:31 PM   Commonly known as:  MEVACOR   Next Dose Due:  This evening        Take 40 mg by mouth every evening.   Dose:  40 mg                        metformin 1000 MG tablet   Commonly known as:  GLUCOPHAGE   Next Dose Due:  9pm        Take 1,000 mg by mouth 2 times a day, with meals.   Dose:  1000 mg                        tiotropium 18 MCG Caps   Last time this was given:  1 Cap on 5/8/2017  8:23 AM   Commonly known as:  SPIRIVA   Next Dose Due:  Tomorrow AM        Inhale 1 Cap by mouth every day.   Dose:  18 mcg                             Where to Get Your Medications      You can get these medications from any pharmacy     Bring a paper prescription for each of these medications    - albuterol 108 (90 BASE) MCG/ACT Aers inhalation aerosol  - amiodarone 400 MG tablet  - cephALEXin 250 MG Caps  - metoprolol 25 MG Tabs      Information about where to get these medications is not yet available     ! Ask your nurse or doctor about these medications    - rivaroxaban 20 MG Tabs tablet            Instructions           Diet / Nutrition:    Follow any diet instructions given to you by your doctor or the dietician, including how much salt (sodium) you are  allowed each day.    If you are overweight, talk to your doctor about a weight reduction plan.    Activity:    Remain physically active following your doctor's instructions about exercise and activity.    Rest often.     Any time you become even a little tired or short of breath, SIT DOWN and rest.    Worsening Symptoms:    Report any of the following signs and symptoms to the doctor's office immediately:    *Pain of jaw, arm, or neck  *Chest pain not relieved by medication                               *Dizziness or loss of consciousness  *Difficulty breathing even when at rest   *More tired than usual                                       *Bleeding drainage or swelling of surgical site  *Swelling of feet, ankles, legs or stomach                 *Fever (>100ºF)  *Pink or blood tinged sputum  *Weight gain (3lbs/day or 5lbs /week)           *Shock from internal defibrillator (if applicable)  *Palpitations or irregular heartbeats                *Cool and/or numb extremities    Stroke Awareness    Common Risk Factors for Stroke include:    Age  Atrial Fibrillation  Carotid Artery Stenosis  Diabetes Mellitus  Excessive alcohol consumption  High blood pressure  Overweight   Physical inactivity  Smoking    Warning signs and symptoms of a stroke include:    *Sudden numbness or weakness of the face, arm or leg (especially on one side of the body).  *Sudden confusion, trouble speaking or understanding.  *Sudden trouble seeing in one or both eyes.  *Sudden trouble walking, dizziness, loss of balance or coordination.Sudden severe headache with no known cause.    It is very important to get treatment quickly when a stroke occurs. If you experience any of the above warning signs, call 911 immediately.                   Disclaimer         Quit Smoking / Tobacco Use:    I understand the use of any tobacco products increases my chance of suffering from future heart disease or stroke and could cause other illnesses which may shorten  my life. Quitting the use of tobacco products is the single most important thing I can do to improve my health. For further information on smoking / tobacco cessation call a Toll Free Quit Line at 1-479.479.1644 (*National [employer] [place]) or 1-426.511.9296 (American Lung Association) or you can access the web based program at www.lungusa.org.    [place] Tobacco Users Help Line:  (706) 449-1842       Toll Free: 1-206.548.8040  Quit Tobacco Program [facility] (161)353-1229    Crisis Hotline:    [place] Crisis Hotline:  0-920-MPBXJJI or 1-854.901.4485    [place] Crisis Hotline:    1-308.265.5770 or 265-214-2964    Discharge Survey:   Thank you for choosing [facility]. We hope we did everything we could to make your hospital stay a pleasant one. You may be receiving a phone survey and we would appreciate your time and participation in answering the questions. Your input is very valuable to us in our efforts to improve our service to our patients and their families.        My signature on this form indicates that:    1. I have reviewed and understand the above information.  2. My questions regarding this information have been answered to my satisfaction.  3. I have formulated a plan with my discharge nurse to obtain my prescribed medications for home.                  Disclaimer         __________________________________                     __________       ________                       Patient Signature                                                 Date                    Time

## 2017-05-03 NOTE — IP AVS SNAPSHOT
Free All Media Access Code: [email or web address]  Expires: 5/29/2017 10:54 AM    Your email address is not on file at [facility].  Email Addresses are required for you to sign up for Free All Media, please contact [phone] to verify your personal information and to provide your email address prior to attempting to register for Free All Media.    Satya Aguilar  [facility] [address] [address]    Free All Media  A secure, online tool to manage your health information     [facility]’s Free All Media® is a secure, online tool that connects you to your personalized health information from the privacy of your home -- day or night - making it very easy for you to manage your healthcare. Once the activation process is completed, you can even access your medical information using the Free All Media janice, which is available for free in the Apple Janice store or Google Play store.     To learn more about Free All Media, visit [email or web address] All [email or web address]    There are two levels of access available (as shown below):   My Chart Features  [facility] Primary Care Doctor [facility]  Specialists [facility]  Urgent  Care Non-[facility] Primary Care Doctor   Email your healthcare team securely and privately 24/7 X X X    Manage appointments: schedule your next appointment; view details of past/upcoming appointments X      Request prescription refills. X      View recent personal medical records, including lab and immunizations X X X X   View health record, including health history, allergies, medications X X X X   Read reports about your outpatient visits, procedures, consult and ER notes X X X X   See your discharge summary, which is a recap of your hospital and/or ER visit that includes your diagnosis, lab results, and care plan X X  X     How to register for Area [employer]:  Once your e-mail address has been verified, follow the following steps to sign up for Area 1 [employer].     1. Go to  [email or web address]  2. Click on the Sign Up Now box, which takes you to the New Member Sign Up  page. You will need to provide the following information:  a. Enter your Health: Elt Access Code exactly as it appears at the top of this page. (You will not need to use this code after you’ve completed the sign-up process. If you do not sign up before the expiration date, you must request a new code.)   b. Enter your date of birth.   c. Enter your home email address.   d. Click Submit, and follow the next screen’s instructions.  3. Create a Health: Elt ID. This will be your Health: Elt login ID and cannot be changed, so think of one that is secure and easy to remember.  4. Create a Health: Elt password. You can change your password at any time.  5. Enter your Password Reset Question and Answer. This can be used at a later time if you forget your password.   6. Enter your e-mail address. This allows you to receive e-mail notifications when new information is available in Health: Elt.  7. Click Sign Up. You can now view your health information.    For assistance activating your Health: Elt account, call [phone]

## 2017-05-03 NOTE — IP AVS SNAPSHOT
" <p align=\"LEFT\"><IMG SRC=\"[email or web address]\" alt=\"Image\" WIDTH=\"50%\" HEIGHT=\"200\" BORDER=\"\"></p>                   Name:Satya Aguilar  Medical Record Number:[id number]  CSN: [id number]    YOB: 1955   Age: 61 y.o.  Sex: male  HT:1.829 m (6') WT: 98.6 kg (217 lb 6 oz)          Admit Date: 5/3/2017     Discharge Date:   Today's Date: 5/8/2017  Attending Doctor:  Nathalie Licona M.D.                  Allergies:  Review of patient's allergies indicates no known allergies.          Your appointments     May 31, 2017  2:40 PM   HOSPITAL FOLLOW UP with ABBIE Liu   [facility] (--)    [address], [facility] [address]  [facility] [address]   [phone]                 Medication List      Take these Medications        Instructions    albuterol 108 (90 BASE) MCG/ACT Aers inhalation aerosol    Inhale 2 Puffs by mouth every 1 hour as needed.   Dose:  2 Puff       amiodarone 400 MG tablet   Commonly known as:  PACERONE    Take 1 Tab by mouth 2 Times a Day.   Dose:  400 mg       aspirin 81 MG tablet    Take 81 mg by mouth every day.   Dose:  81 mg       cephALEXin 250 MG Caps   Commonly known as:  KEFLEX    Take 1 Cap by mouth 4 times a day for 3 days.   Dose:  250 mg       citalopram 10 MG tablet   Commonly known as:  CELEXA    Take 10 mg by mouth every day.   Dose:  10 mg       gabapentin 400 MG Caps   What changed:  Another medication with the same name was removed. Continue taking this medication, and follow the directions you see here.   Commonly known as:  NEURONTIN    Take 400 mg by mouth 3 times a day.   Dose:  400 mg       gemfibrozil 600 MG Tabs   Commonly known as:  LOPID    Take 600 mg by mouth 2 times a day.   Dose:  600 mg       glyBURIDE 2.5 MG Tabs   Commonly known as:  DIABETA    Take 2.5 mg by mouth every morning with breakfast.   Dose:  2.5 mg       lovastatin 40 MG tablet   Commonly known as:  MEVACOR    Take 40 mg by mouth every evening. "   Dose:  40 mg       metformin 1000 MG tablet   Commonly known as:  GLUCOPHAGE    Take 1,000 mg by mouth 2 times a day, with meals.   Dose:  1000 mg       metoprolol 25 MG Tabs   Commonly known as:  LOPRESSOR    Take 1 Tab by mouth 2 Times a Day.   Dose:  25 mg       risperidone 3 MG Tabs   What changed:  Another medication with the same name was removed. Continue taking this medication, and follow the directions you see here.   Commonly known as:  RISPERDAL    Take 3 mg by mouth every evening.   Dose:  3 mg       rivaroxaban 20 MG Tabs tablet   Commonly known as:  XARELTO    Take 1 Tab by mouth with dinner.   Dose:  20 mg       tiotropium 18 MCG Caps   Commonly known as:  SPIRIVA    Inhale 1 Cap by mouth every day.   Dose:  18 mcg

## 2017-05-04 ENCOUNTER — APPOINTMENT (OUTPATIENT)
Dept: RADIOLOGY | Facility: MEDICAL CENTER | Age: 62
DRG: 871 | End: 2017-05-04
Attending: INTERNAL MEDICINE
Payer: COMMERCIAL

## 2017-05-04 PROBLEM — L97.519 RIGHT FOOT ULCER (HCC): Status: ACTIVE | Noted: 2017-05-04

## 2017-05-04 LAB
ALBUMIN SERPL BCP-MCNC: 3.1 G/DL (ref 3.2–4.9)
ALBUMIN SERPL BCP-MCNC: 3.4 G/DL (ref 3.2–4.9)
ALBUMIN/GLOB SERPL: 1.3 G/DL
ALP SERPL-CCNC: 69 U/L (ref 30–99)
ALT SERPL-CCNC: 5 U/L (ref 2–50)
AMORPH CRY #/AREA URNS HPF: PRESENT /HPF
ANION GAP SERPL CALC-SCNC: 5 MMOL/L (ref 0–11.9)
APPEARANCE UR: CLEAR
AST SERPL-CCNC: 10 U/L (ref 12–45)
BILIRUB SERPL-MCNC: 0.5 MG/DL (ref 0.1–1.5)
BILIRUB UR QL STRIP.AUTO: NEGATIVE
BUN SERPL-MCNC: 33 MG/DL (ref 8–22)
BUN SERPL-MCNC: 34 MG/DL (ref 8–22)
CALCIUM SERPL-MCNC: 7.7 MG/DL (ref 8.5–10.5)
CALCIUM SERPL-MCNC: 7.9 MG/DL (ref 8.5–10.5)
CHLORIDE SERPL-SCNC: 104 MMOL/L (ref 96–112)
CHLORIDE SERPL-SCNC: 107 MMOL/L (ref 96–112)
CO2 SERPL-SCNC: 22 MMOL/L (ref 20–33)
CO2 SERPL-SCNC: 23 MMOL/L (ref 20–33)
COLOR UR: YELLOW
CORTIS SERPL-MCNC: 13 UG/DL (ref 0–23)
CREAT SERPL-MCNC: 1.98 MG/DL (ref 0.5–1.4)
CREAT SERPL-MCNC: 2.38 MG/DL (ref 0.5–1.4)
EKG IMPRESSION: NORMAL
EPI CELLS #/AREA URNS HPF: ABNORMAL /HPF
ERYTHROCYTE [DISTWIDTH] IN BLOOD BY AUTOMATED COUNT: 43.3 FL (ref 35.9–50)
GFR SERPL CREATININE-BSD FRML MDRD: 28 ML/MIN/1.73 M 2
GFR SERPL CREATININE-BSD FRML MDRD: 35 ML/MIN/1.73 M 2
GLOBULIN SER CALC-MCNC: 2.7 G/DL (ref 1.9–3.5)
GLUCOSE BLD-MCNC: 100 MG/DL (ref 65–99)
GLUCOSE BLD-MCNC: 121 MG/DL (ref 65–99)
GLUCOSE BLD-MCNC: 152 MG/DL (ref 65–99)
GLUCOSE BLD-MCNC: 190 MG/DL (ref 65–99)
GLUCOSE BLD-MCNC: 98 MG/DL (ref 65–99)
GLUCOSE SERPL-MCNC: 139 MG/DL (ref 65–99)
GLUCOSE SERPL-MCNC: 157 MG/DL (ref 65–99)
GLUCOSE UR STRIP.AUTO-MCNC: NEGATIVE MG/DL
HCT VFR BLD AUTO: 43.2 % (ref 42–52)
HGB BLD-MCNC: 13.3 G/DL (ref 14–18)
KETONES UR STRIP.AUTO-MCNC: NEGATIVE MG/DL
LACTATE BLD-SCNC: 1.1 MMOL/L (ref 0.5–2)
LEUKOCYTE ESTERASE UR QL STRIP.AUTO: NEGATIVE
MAGNESIUM SERPL-MCNC: 1.8 MG/DL (ref 1.5–2.5)
MCH RBC QN AUTO: 28.2 PG (ref 27–33)
MCHC RBC AUTO-ENTMCNC: 30.8 G/DL (ref 33.7–35.3)
MCV RBC AUTO: 91.5 FL (ref 81.4–97.8)
MICRO URNS: ABNORMAL
NITRITE UR QL STRIP.AUTO: NEGATIVE
PH UR STRIP.AUTO: 5.5 [PH]
PHOSPHATE SERPL-MCNC: 3.9 MG/DL (ref 2.5–4.5)
PLATELET # BLD AUTO: 317 K/UL (ref 164–446)
PMV BLD AUTO: 10.4 FL (ref 9–12.9)
POTASSIUM SERPL-SCNC: 5.8 MMOL/L (ref 3.6–5.5)
POTASSIUM SERPL-SCNC: 7.6 MMOL/L (ref 3.6–5.5)
PROT SERPL-MCNC: 6.1 G/DL (ref 6–8.2)
PROT UR QL STRIP: 30 MG/DL
RBC # BLD AUTO: 4.72 M/UL (ref 4.7–6.1)
RBC # URNS HPF: ABNORMAL /HPF
RBC UR QL AUTO: ABNORMAL
SODIUM SERPL-SCNC: 132 MMOL/L (ref 135–145)
SODIUM SERPL-SCNC: 134 MMOL/L (ref 135–145)
SP GR UR STRIP.AUTO: >1.035
WBC # BLD AUTO: 23.1 K/UL (ref 4.8–10.8)
WBC #/AREA URNS HPF: ABNORMAL /HPF

## 2017-05-04 PROCEDURE — 700105 HCHG RX REV CODE 258: Performed by: INTERNAL MEDICINE

## 2017-05-04 PROCEDURE — 76775 US EXAM ABDO BACK WALL LIM: CPT

## 2017-05-04 PROCEDURE — 81001 URINALYSIS AUTO W/SCOPE: CPT

## 2017-05-04 PROCEDURE — 83735 ASSAY OF MAGNESIUM: CPT

## 2017-05-04 PROCEDURE — 85027 COMPLETE CBC AUTOMATED: CPT

## 2017-05-04 PROCEDURE — 700101 HCHG RX REV CODE 250: Performed by: HOSPITALIST

## 2017-05-04 PROCEDURE — 700111 HCHG RX REV CODE 636 W/ 250 OVERRIDE (IP): Performed by: INTERNAL MEDICINE

## 2017-05-04 PROCEDURE — 700101 HCHG RX REV CODE 250: Performed by: INTERNAL MEDICINE

## 2017-05-04 PROCEDURE — 80069 RENAL FUNCTION PANEL: CPT

## 2017-05-04 PROCEDURE — A9270 NON-COVERED ITEM OR SERVICE: HCPCS | Performed by: INTERNAL MEDICINE

## 2017-05-04 PROCEDURE — 36556 INSERT NON-TUNNEL CV CATH: CPT | Performed by: INTERNAL MEDICINE

## 2017-05-04 PROCEDURE — 71010 DX-CHEST-PORTABLE (1 VIEW): CPT

## 2017-05-04 PROCEDURE — 94640 AIRWAY INHALATION TREATMENT: CPT

## 2017-05-04 PROCEDURE — 82962 GLUCOSE BLOOD TEST: CPT | Mod: 91

## 2017-05-04 PROCEDURE — 76937 US GUIDE VASCULAR ACCESS: CPT

## 2017-05-04 PROCEDURE — 770022 HCHG ROOM/CARE - ICU (200)

## 2017-05-04 PROCEDURE — C1751 CATH, INF, PER/CENT/MIDLINE: HCPCS

## 2017-05-04 PROCEDURE — 02HV33Z INSERTION OF INFUSION DEVICE INTO SUPERIOR VENA CAVA, PERCUTANEOUS APPROACH: ICD-10-PCS | Performed by: INTERNAL MEDICINE

## 2017-05-04 PROCEDURE — 83605 ASSAY OF LACTIC ACID: CPT

## 2017-05-04 PROCEDURE — 99291 CRITICAL CARE FIRST HOUR: CPT | Mod: 25 | Performed by: INTERNAL MEDICINE

## 2017-05-04 PROCEDURE — 700102 HCHG RX REV CODE 250 W/ 637 OVERRIDE(OP): Performed by: INTERNAL MEDICINE

## 2017-05-04 PROCEDURE — 700101 HCHG RX REV CODE 250

## 2017-05-04 PROCEDURE — 36556 INSERT NON-TUNNEL CV CATH: CPT

## 2017-05-04 PROCEDURE — B548ZZA ULTRASONOGRAPHY OF SUPERIOR VENA CAVA, GUIDANCE: ICD-10-PCS | Performed by: INTERNAL MEDICINE

## 2017-05-04 PROCEDURE — A4314 CATH W/DRAINAGE 2-WAY LATEX: HCPCS | Performed by: INTERNAL MEDICINE

## 2017-05-04 PROCEDURE — 99233 SBSQ HOSP IP/OBS HIGH 50: CPT | Performed by: HOSPITALIST

## 2017-05-04 PROCEDURE — 82533 TOTAL CORTISOL: CPT

## 2017-05-04 PROCEDURE — 302135 SEQUENTIAL COMPRESSION MACHINE: Performed by: INTERNAL MEDICINE

## 2017-05-04 PROCEDURE — 73620 X-RAY EXAM OF FOOT: CPT | Mod: RT

## 2017-05-04 RX ORDER — SODIUM POLYSTYRENE SULFONATE 15 G/60ML
15 SUSPENSION ORAL; RECTAL ONCE
Status: COMPLETED | OUTPATIENT
Start: 2017-05-04 | End: 2017-05-04

## 2017-05-04 RX ORDER — DEXTROSE MONOHYDRATE 25 G/50ML
50 INJECTION, SOLUTION INTRAVENOUS ONCE
Status: COMPLETED | OUTPATIENT
Start: 2017-05-04 | End: 2017-05-04

## 2017-05-04 RX ORDER — SODIUM CHLORIDE 9 MG/ML
INJECTION, SOLUTION INTRAVENOUS CONTINUOUS
Status: DISCONTINUED | OUTPATIENT
Start: 2017-05-04 | End: 2017-05-08 | Stop reason: HOSPADM

## 2017-05-04 RX ADMIN — PIPERACILLIN AND TAZOBACTAM 2.25 G: 2; .25 INJECTION, POWDER, LYOPHILIZED, FOR SOLUTION INTRAVENOUS; PARENTERAL at 14:18

## 2017-05-04 RX ADMIN — ALBUTEROL SULFATE 2.5 MG: 2.5 SOLUTION RESPIRATORY (INHALATION) at 09:08

## 2017-05-04 RX ADMIN — HEPARIN SODIUM 5000 UNITS: 5000 INJECTION, SOLUTION INTRAVENOUS; SUBCUTANEOUS at 14:18

## 2017-05-04 RX ADMIN — HEPARIN SODIUM 5000 UNITS: 5000 INJECTION, SOLUTION INTRAVENOUS; SUBCUTANEOUS at 05:21

## 2017-05-04 RX ADMIN — PIPERACILLIN AND TAZOBACTAM 2.25 G: 2; .25 INJECTION, POWDER, LYOPHILIZED, FOR SOLUTION INTRAVENOUS; PARENTERAL at 21:14

## 2017-05-04 RX ADMIN — STANDARDIZED SENNA CONCENTRATE AND DOCUSATE SODIUM 2 TABLET: 8.6; 5 TABLET, FILM COATED ORAL at 21:04

## 2017-05-04 RX ADMIN — SODIUM POLYSTYRENE SULFONATE 15 G: 15 SUSPENSION ORAL; RECTAL at 01:54

## 2017-05-04 RX ADMIN — INSULIN HUMAN 10 UNITS: 100 INJECTION, SOLUTION PARENTERAL at 01:54

## 2017-05-04 RX ADMIN — CALCIUM GLUCONATE 1 G: 94 INJECTION, SOLUTION INTRAVENOUS at 00:24

## 2017-05-04 RX ADMIN — MORPHINE SULFATE 4 MG: 4 INJECTION INTRAVENOUS at 11:51

## 2017-05-04 RX ADMIN — LOVASTATIN 40 MG: 20 TABLET ORAL at 21:04

## 2017-05-04 RX ADMIN — NOREPINEPHRINE BITARTRATE 10 MCG/MIN: 1 INJECTION INTRAVENOUS at 01:39

## 2017-05-04 RX ADMIN — PIPERACILLIN AND TAZOBACTAM 2.25 G: 2; .25 INJECTION, POWDER, LYOPHILIZED, FOR SOLUTION INTRAVENOUS; PARENTERAL at 02:30

## 2017-05-04 RX ADMIN — OXYCODONE HYDROCHLORIDE 10 MG: 10 TABLET ORAL at 15:42

## 2017-05-04 RX ADMIN — HEPARIN SODIUM 5000 UNITS: 5000 INJECTION, SOLUTION INTRAVENOUS; SUBCUTANEOUS at 21:05

## 2017-05-04 RX ADMIN — SODIUM CHLORIDE 1500 ML: 9 INJECTION, SOLUTION INTRAVENOUS at 00:26

## 2017-05-04 RX ADMIN — INSULIN LISPRO 1 UNITS: 100 INJECTION, SOLUTION INTRAVENOUS; SUBCUTANEOUS at 21:14

## 2017-05-04 RX ADMIN — GABAPENTIN 400 MG: 400 CAPSULE ORAL at 21:04

## 2017-05-04 RX ADMIN — SODIUM CHLORIDE: 9 INJECTION, SOLUTION INTRAVENOUS at 01:18

## 2017-05-04 RX ADMIN — DEXTROSE MONOHYDRATE 50 ML: 25 INJECTION, SOLUTION INTRAVENOUS at 01:54

## 2017-05-04 RX ADMIN — SODIUM CHLORIDE: 9 INJECTION, SOLUTION INTRAVENOUS at 10:00

## 2017-05-04 RX ADMIN — PIPERACILLIN AND TAZOBACTAM 2.25 G: 2; .25 INJECTION, POWDER, LYOPHILIZED, FOR SOLUTION INTRAVENOUS; PARENTERAL at 08:37

## 2017-05-04 RX ADMIN — NOREPINEPHRINE BITARTRATE 8 MG: 1 INJECTION INTRAVENOUS at 00:29

## 2017-05-04 RX ADMIN — RISPERIDONE 3 MG: 0.5 TABLET, FILM COATED ORAL at 21:04

## 2017-05-04 RX ADMIN — SODIUM CHLORIDE: 9 INJECTION, SOLUTION INTRAVENOUS at 17:24

## 2017-05-04 RX ADMIN — MORPHINE SULFATE 4 MG: 4 INJECTION INTRAVENOUS at 08:37

## 2017-05-04 RX ADMIN — GABAPENTIN 400 MG: 400 CAPSULE ORAL at 15:42

## 2017-05-04 RX ADMIN — TIOTROPIUM BROMIDE 1 CAPSULE: 18 CAPSULE ORAL; RESPIRATORY (INHALATION) at 11:51

## 2017-05-04 RX ADMIN — ONDANSETRON 4 MG: 2 INJECTION INTRAMUSCULAR; INTRAVENOUS at 11:13

## 2017-05-04 ASSESSMENT — PAIN SCALES - GENERAL
PAINLEVEL_OUTOF10: 2
PAINLEVEL_OUTOF10: 3
PAINLEVEL_OUTOF10: 6
PAINLEVEL_OUTOF10: 3
PAINLEVEL_OUTOF10: 3
PAINLEVEL_OUTOF10: 1
PAINLEVEL_OUTOF10: 5
PAINLEVEL_OUTOF10: 5
PAINLEVEL_OUTOF10: 3

## 2017-05-04 ASSESSMENT — ENCOUNTER SYMPTOMS
DIARRHEA: 0
MUSCULOSKELETAL NEGATIVE: 1
ABDOMINAL PAIN: 1
CHILLS: 0
HEARTBURN: 0
HEADACHES: 0
PSYCHIATRIC NEGATIVE: 1
COUGH: 0
CARDIOVASCULAR NEGATIVE: 1
EYES NEGATIVE: 1
WEAKNESS: 1
FEVER: 0
RESPIRATORY NEGATIVE: 1

## 2017-05-04 ASSESSMENT — COPD QUESTIONNAIRES
COPD SCREENING SCORE: 3
DO YOU EVER COUGH UP ANY MUCUS OR PHLEGM?: NO/ONLY WITH OCCASIONAL COLDS OR INFECTIONS
DURING THE PAST 4 WEEKS HOW MUCH DID YOU FEEL SHORT OF BREATH: NONE/LITTLE OF THE TIME
HAVE YOU SMOKED AT LEAST 100 CIGARETTES IN YOUR ENTIRE LIFE: NO/DON'T KNOW

## 2017-05-04 ASSESSMENT — LIFESTYLE VARIABLES
EVER_SMOKED: YES
ALCOHOL_USE: NO
DO YOU DRINK ALCOHOL: NO

## 2017-05-04 NOTE — PROGRESS NOTES
14F bustos catheter placed without difficulty. Patient tolerated insertion without difficulty. Urine sent to lab per Dr. Eaton's order.

## 2017-05-04 NOTE — PROGRESS NOTES
Lab called with critical result of K 7.6 at 1220. Critical lab result read back to lab.   Dr. Eaton notified of critical lab result at 1220.  Critical lab result read back by Dr. Eaton.

## 2017-05-04 NOTE — ED NOTES
[facility] Per pharmacist, they don't dispense pt's tramadol, valium, and norco. Pharmacist ran  report on pt, report does not show pt filling these meds in the last 2 years.

## 2017-05-04 NOTE — PROGRESS NOTES
[facility] Pt belongings transferred with pt. Monitors notified.

## 2017-05-04 NOTE — PROGRESS NOTES
Change in patient condition due to: Cardiac  Rapid Response called at: 5954   Physician Dr. Acevedo notified at 1345    See Code Blue timeline for rapid response events. Patient Transferred to Higher Level of Care

## 2017-05-04 NOTE — FLOWSHEET NOTE
Respiratory Care and Assessment  Treatment given with -0- wheeze after. Pt states he uses only Albuterol MDI prn at home. Will start Spiriva per MD and SVN will be prn will re-check     05/04/17 0909   Type of Assessment   Assessment Yes   Patient History  Per Pt Asthma (no COPD in Chart)   Home O2 No   Home Treatments/Frequency  Albuterol MDI prn   COPD Population Screener   During the past 4 weeks, how much did you feel short of breath? 0   Do you ever cough up any mucus or phlegm? 0   In the past 12 months, you do less than you used to because of your breathing problems 1   Have you smoked at least 100 cigarettes in your entire life? 0   How old are you? 2   COPD Screening Score 3   Smoking History   Have you Ever Smoked Yes   Have you Smoked in the Last 12 Mos Yes   Have you Quit Smoking No    Smoking Cessation Offered (Daily marihuanna smoker)   Level Of Consciousness   Level of Consciousness Alert   Respiratory WDL   Respiratory (WDL) X   Chest Exam   Respiration 19   Pulse 74 post=78   Heart Rate (Monitored) 74   Breath Sounds   RLL Breath Sounds Diminished;Expiratory Wheezes  (improved aeration -0- wheeze after)   LLL Breath Sounds Diminished;Expiratory Wheezes  (improved aeration -0- wheeze after)   Oximetry   Continuous Oximetry Yes   Oxygen   Home O2 Use Prior To Admission? No   Pulse Oximetry 96 %   O2 (LPM) 2   O2 Daily Delivery Respiratory  Silicone Nasal Cannula   Bronchodilator Protocol   Is this an exacerbation of COPD/Asthma? No   Obstructive Ventilatory Defect or Pulmonary Disease without Obvious Obstruction Physical Exam / Hyperinflation / Wheezing (bronchospasm)   Breath Sounds Criteria 2    Benefit Criteria 1    Pulse Criteria 0    Respiratory Rate Criteria 0    S.O.B. Criteria 0    Criteria Total 3   Point Values 0-3 - PRN   Bronchodilator Goals/Outcome Diminished Wheezing and Volume of Air Movement Increased   O2 Protocol   O2 Protocol Indications Acute Care situation where Hypoxemia is  suspected or possible   Continuous oxygen initiated for: SpO2 90-95% on Oxygen   O2 Protocol Goals/Outcome Normoxemia on Oxygen, SpO2 greater than 90%

## 2017-05-04 NOTE — CONSULTS
DATE OF SERVICE:  05/04/2017    PULMONARY AND CRITICAL CARE CONSULTATION    REFERRING PHYSICIAN:  Kiara Acevedo DO    REASON FOR CONSULTATION:  Management of septic shock.    HISTORY OF PRESENT ILLNESS:  The patient is a very pleasant 61-year-old male   with a past medical history significant for diabetes as well as asthma and   hypertension who was brought to the emergency department by emergency medical   services after having a syncopal episode earlier on the day of admission.  The   patient reports that he stood up and felt lightheaded and then fell hitting   his head.  He also complained of mild neck pain after the fainting spell.    Throughout the day, the patient has been having worsening diffuse generalized   abdominal pain as well as nausea.  He denies any vomiting, diarrhea, chills,   chest pain or shortness of breath prior to his syncopal episodes.  Denies a   history of similar symptoms.  Patient denies any recent sick contacts or   diarrheal illnesses.    Patient was originally admitted to the telemetry floor for acute kidney injury   along with mild hypotension and what seems to be acute colitis.  However,   after 4 liters of IV fluids, the patient still had a systolic blood pressure   in the 70s requiring a rapid response and subsequent transfer to the ICU for   continued management.  In the ICU, central line was placed and pressors were   started as well as the sepsis protocol.    REVIEW OF SYSTEMS:  Patient reports generalized abdominal pain with   distention.  No diarrhea or vomiting.  Patient also denies any shortness of   breath or chest pain.  He has been having nausea and has been having syncopal   episodes related to the dizziness and lightheadedness.  He also complains of   some mild neck pain and headache after his fall earlier today.    PAST MEDICAL HISTORY:  Significant for hypertension as well as diabetes,   asthma, sleep apnea, and high cholesterol.    PAST SURGICAL HISTORY:  None  reported.    SOCIAL HISTORY:  The patient is a lifelong nonsmoker.  He does drink alcohol   occasionally.  The patient does live in [place].  He denies any illegal drugs,   but does use marijuana daily.    FAMILY HISTORY:  Negative for any lung cancer or stroke in the family.    OUTPATIENT MEDICATIONS:  Include aspirin 81 mg daily, Celexa 10 mg daily,   Neurontin 400 mg 3 times daily, gemfibrozil 600 mg twice daily, glyburide 2.5   mg every morning, lovastatin 40 mg daily, metformin 1000 mg twice daily,   risperidone 1 mg at bedtime as well as 3 mg in the evening and lastly Spiriva   1 puff daily.    ALLERGIES:  No known drug allergies.    PHYSICAL EXAMINATION:  VITAL SIGNS:  Blood pressures have ranged from 60s-100s/40s-60s, heart rate in   the 70s-80s, respiratory rate of 20-24, T-max 98.9 and oxygen saturation of   % on 5 liters nasal cannula.  GENERAL:  He is awake, alert, answering all questions appropriately and   following all commands appropriately.  HEENT:  PERRLA, EOMI.  Conjunctivae are pink.  Sclerae are anicteric.    Oropharynx is slightly tacky mucous membranes.  No oral lesions noted.  NECK:  Supple.  No adenopathy or thyromegaly appreciated.  CARDIOVASCULAR:  Regular rate and rhythm without murmur, rub or gallop.  LUNGS:  Clear to auscultation bilaterally.  No wheezing noted.  ABDOMEN:  Appears to be distended with hypoactive bowel sounds and mild   diffuse tenderness in the lower quadrants.  No rebound tenderness and no   guarding.  EXTREMITIES:  Moving all extremities with full range of motion.  No clubbing,   cyanosis or edema.  2+ radial pulses, 1+ dorsal pedal pulses bilaterally.    Warm and dry with slightly delayed cap refill by 1-2 seconds.  NEUROLOGIC:  The patient has clear speech symmetrical facial expressions   moving all appropriately answering all appropriately.  No obvious motor or   sensory deficits.    LABORATORY DATA:  White count of 24.6, hemoglobin of 13.9, hematocrit of  44.1,   platelets of 323.  Sodium of 132, potassium of 7.6, chloride of 104, serum   bicarbonate of 23, glucose of 139, BUN of 34, creatinine of 2.38, calcium of   7.9, AST of 10 and ALT of 5, alkaline phosphatase of 69, total bilirubin of   0.5, albumin of 3.4, INR of 0.98.  Troponin is less than 0.01, most recent   lactic acid was 1.4, but initially was 2.4.  Urinalysis shows wbc's of 0-2,   rbc's of 5-10, bacteria few with a specific gravity greater than 1.035 and   protein present.  Lipase is 49.  Chest x-ray does not show any infiltrates or   effusions.  CT of the thoracic aortic evaluation shows no evidence of aortic   dissection.  No aneurysmal dilatation.  No mediastinal or retroperitoneal   hematoma.  There is atelectasis within both lungs without pleural effusions.    There are distended small bowel loops with wall edema on the left side of his   abdomen consistent with enteritis or possibly Crohn's, small free fluid   collections within the abdomen and pelvis, diverticulosis of the colon without   evidence of diverticulitis, hepatic steatosis, tiny left adrenal gland   nodule, and questionable tiny calcified gallstones.  CT of the spine shows no   acute cervical spine fracture.  CT of head showed no evidence of acute   intracranial process.    IMPRESSION:  1.  Severe sepsis with septic shock likely related to a gastrointestinal   source.  2.  Acute kidney injury.  3.  Acute hyperkalemia.  4.  Acute small bowel enteritis.  5.  Acute leukocytosis.  6.  Acute mild lactic acidosis.  7.  Severe hypovolemia.  8.  Acute hypocalcemia.  9.  History of systemic arterial hypertension.  10.  History of type 2 diabetes.  11.  History of asthma.  12.  History of obstructive sleep apnea.  13.  History of marijuana dependence.    PLAN:  This patient is critically ill at this time with septic shock, likely   related to gastrointestinal source based on the patient's most recent CAT scan   of his abdomen.  He has been  started on broad spectrum antibiotics of Zosyn   and has been also started on the sepsis protocol.  The patient required a   central line placement to start pressor therapies after he received almost 6   liters of IV fluids prior to transfer to the ICU.  In the meantime, we will   check a random cortisol level to determine that he does not have any relative   adrenal insufficiency to account for his continued hypotension.  However, we   will continue along with the sepsis protocol by following central venous   pressures, lactic acids, and starting a vasopressor therapy to maintain mean   arterial pressures greater than 65.  Due to the patient's acute kidney injury,   we will continue to monitor urinary output very closely, and if he does not   continue to have improved creatinine and urinary output with fluid   resuscitation, we will likely involve nephrology to consult.  In the meantime,   the patient has been started on insulin as well as glucose and calcium   gluconate to help or lower the patient's hyperkalemia.  For now, we will   continue all efforts and supportive care.  The patient will remain n.p.o. for   the evening.  We will start deep venous thrombosis prophylaxis.    Thank you for allowing the pulmonary critical care team to participate in this   patient's care.  We will continue to follow along.  This case was discussed   at length with the ICU nursing staff as well as the transferring physician.    This patient is critically ill at this time.    Critical care time is approximately 45 minutes in direct critical care as well   as extensive data review.  There was no overlap with other physicians and   this excludes any procedures.       ____________________________________     MD ANNAMARIE SAUNDERS / ALEX    DD:  05/04/2017 01:14:07  DT:  05/04/2017 02:29:09    D#:  [id number]  Job#:  [id number]

## 2017-05-04 NOTE — PROGRESS NOTES
Central line placed by Dr. Eaton. Patient tolerated procedure well. CXR ordered for placement confirmation.

## 2017-05-04 NOTE — CARE PLAN
Problem: Fluid Volume:  Goal: Will maintain balanced intake and output  Intervention: Monitor, educate, and encourage compliance with therapeutic intake of liquids  Q 2 hour urine output, CVP continuous, BP q 15 minutes      Problem: Skin Integrity  Goal: Risk for impaired skin integrity will decrease  Intervention: Implement precautions to protect skin integrity in collaboration with the interdisciplinary team  Q 2 hour turning, pillows for positioning, low airloss mattress with firmness checked q 4 hours, pulse ox repositioned q 2 hours, heels floated on pillows, no devices left under patient.

## 2017-05-04 NOTE — CARE PLAN
Problem: Safety  Goal: Will remain free from falls  Intervention: Assess risk factors for falls  Patient instructed to call for assistance before attempting to get out of bed.      Problem: Pain Management  Goal: Pain level will decrease to patient’s comfort goal  Intervention: Follow pain managment plan developed in collaboration with patient and Interdisciplinary Team  Assess and medicate as needed for complaints of acute pain.

## 2017-05-04 NOTE — PROGRESS NOTES
Received pt from ED. Pt on the monitor. Monitors aware and notified. Pt hypotensive upon arrival. Charge notified.

## 2017-05-04 NOTE — WOUND TEAM
Wound team consult placed regarding neuropathic ulcerations to Pt's distal right toes as well as his right 2-4 plantar met head.  Upon observation, all areas at the distal toes are currently dry and intact.  Recommend leaving them GIA and allowing small crusts to fall off on their own.  The plantar met head wound is not draining, no sign/symptoms of infection and is presenting with thin layer of epithelial tissue over the margins of the wound.  Recommend leaving this site GIA as well, expect it to resolve in next several days.  Should these sites begin draining, please re consult wound team for intervention.

## 2017-05-04 NOTE — H&P
PRIMARY CARE PHYSICIAN:  Mohsen Tamasaby, MD    CONSULTS:  Kyung Eaton MD, pulmonology, critical care.    CHIEF COMPLAINT:  Loss of consciousness.    HISTORY OF PRESENT ILLNESS:  The patient is a 61-year-old male with history of   COPD, type 2 diabetes, hyperlipidemia, and anxiety, who presents with loss of   consciousness.    The patient says that he first began having abdominal pain 2 days ago.  He   says the pain was diffuse, however, more in the lower part of his abdomen, it   was associated with nausea, but no diarrhea or vomiting.  He had a normal   bowel movement this morning with no blood in it.  He was able to eat somewhat;   however, said he did have a low appetite.  He did have shrimp at a local   casino last night; however, his abdominal pain started before eating this.    This afternoon, he was at home and got up to reach for something from above   his TV and suddenly lost consciousness.  He says he did hit his head.  His   wife witnessed this and sent him to the ER for evaluation.  He does not recall   the events; however, does remember the ambulance bring him to the emergency   department.    He says he was hospitalized recently a few months ago back in February as he   lost consciousness at that time as well and was diagnosed with volume   depletion.  He had an echocardiogram at that time, it was normal.    REVIEW OF SYSTEMS:  He has had low appetite and abdominal pain for the last 2   days.  He had loss of consciousness today; he denies any diarrhea or blood in   his stool.  He has not had any vomiting.  No fevers or chills.  No chest pain.    No leg swelling.  He has had a blister on the bottom of his right foot for   the last 1 week.    REVIEW OF SYSTEMS:  All other systems reviewed and otherwise negative aside   from mentioned above.    SOCIAL HISTORY:  He has never smoked.  Denies recreational drugs.  He drinks   alcohol occasionally in moderation.    PAST MEDICAL HISTORY:  Hypertension,  diabetes, asthma, hyperlipidemia.    PAST SURGICAL HISTORY:  He has had a right knee surgery in 2010, no other   surgeries.    FAMILY HISTORY:  His father had prostate cancer.  He denies any family history   of heart attack, stroke, inflammatory bowel disease or colon cancer.    ALLERGIES:  No known medication allergies.    MEDICATIONS PRIOR TO ADMISSION:  Gabapentin, lovastatin, gemfibrozil, aspirin,   Risperdal, Spiriva, Celexa, glyburide, and metformin.    OBJECTIVE:  VITAL SIGNS:  Blood pressure is 72/49, improved to greater than 100 after 2   liters of fluid; however, returned then to the 70s; pulse 79; temperature   36.7; respirations 14; oxygen saturation 97% on room air.  GENERAL:  Drowsy, poor historian, ill appearing, lying in bed.  PSYCHIATRIC:  Poor historian.  Slightly strange affect, but pleasant.  HEENT:  Dry mucous membranes.  Normal dentition.  NECK:  Trachea is midline.  Nonpalpable thyroid.  HEART:  Regular rate and rhythm, no murmurs.  No peripheral edema.  Radial   pulses are 2+ bilaterally.  LUNGS:  Clear to auscultation bilaterally, good air movement.  No accessory   muscle use.  ABDOMEN:  Soft, nontender, nondistended.  No hepatomegaly.  SKIN:  Warm and dry.  He is slightly pale.  No rashes in visible areas.  No   mottling.  Good capillary refill.  On the bottom of his right foot, he has   approximately 1 cm ulcer that is draining serosanguineous fluid.  There are   approximately 2 small other nearby ulcers.  He has calluses on both feet.  NEUROLOGIC:  Cranial nerves are intact.  Sensation is symmetrical in upper and   lower extremities bilaterally.    PERTINENT LABORATORY VALUES:  1.  White count 24.4, creatinine 2.66, potassium 6.3, glucose 117, BUN 33.  2.  Troponin less than 0.01.  BNP 16.  Lactic acid 2.4.  Lipase 49.    EMERGENCY ROOM COURSE:  In the ER, he was admitted initially as a Code Aorta   as he did have syncope and was complaining of abdominal pain.  CT of the aorta   was  negative for dissection.  He was given about 2 liters of IV fluids and   had improvement in his blood pressure.    IMAGIN.  CT head without contrast, negative for acute intracranial abnormality.  2.  CT C-spine.  No acute cervical spine fracture identified.  3.  CT thoracic aorta.  No evidence of aortic dissection or aneurysmal   dilatation.  No mediastinal or retroperitoneal hematoma.  Atelectasis in both   lungs.  No pleural effusions, distended small bowel loops with wall edema,   left-sided abdomen consistent with enteritis, possibly Crohn's, infection not   excluded.  Small free fluid collections within the abdomen and pelvis.    Diverticulosis of the colon without evidence of diverticulitis.  Hepatic   steatosis.  4.  Chest x-ray reviewed independently and per radiology, negative for acute   cardiopulmonary process.    ASSESSMENT AND PLAN:  The patient is a 61-year-old male with a history of   hypertension, hyperlipidemia, and type 2 diabetes, who was admitted for loss   of consciousness, found to have profound hypotension and acute kidney injury   with _____.  1.  Syncope, likely related to volume depletion and hypotension.  He has had   low appetite for 2 days.  He has marked acute kidney injury.  He had a recent   normal echo, therefore cardiogenic etiology is less likely as his troponin is   also negative.  He will be given aggressive IV fluids and monitored on   telemetry.  He will be admitted to the ICU, given that he does also have the   presence of sepsis and ongoing hypotension.  2.  Severe sepsis versus septic shock.  Source is likely enteritis as seen on   CT.  Though he does have right foot diabetic ulcers, these do not look frankly   infected.  Blood cultures have been drawn.  Wound culture of the right foot   has been ordered.  He has been started on empiric antibiotics in the emergency   department.  I will continue Zosyn.  We will trend lactic acid with   aggressive IV fluids.  He is  continuously hypotensive.  I have discussed the   case with Dr. Kyung Eaton who will place a central line.  He will require   vasopressors if his blood pressure stays less than 90 and his MAP stays less   than 65.  3.  acute kidney injury, likely due to volume depletion.  He was on metformin   at home, which I will hold.  I will hold any nephrotoxic medications.  I will   order a renal ultrasound.  4.  Hyperkalemia.  I have given calcium gluconate.  I will also order D5 and   insulin as well as Kayexalate.  He will require telemetry monitoring.  I will   repeat renal panel in approximately 1-2 hours to ensure his levels are   trending down.  5.  Lactic acidosis.  As mentioned under sepsis, provide aggressive fluids and   pressors as needed to maintain blood pressure.  6.  Enteritis versus Crohn's.  He has no family history of Crohn's and no   personal history.  This may be infectious.  He will need eventual GI   consultation once he is stabilized.  7.  Volume depletion.  Aggressive fluid as discussed above.  8.  Chronic obstructive pulmonary disease.  Continue home inhalers.  9.  History of anxiety.  Continue Risperdal.  10.  Right foot ulcer.  This may be the source of his severe sepsis, though it   does look more chronic.  I will order an x-ray to evaluate for any bony   destruction to suggest osteomyelitis.  He has been initiated on Zosyn.  We   will follow cultures.  He may need limb preservation and infectious disease;   however, in the meantime, I have ordered wound care to help evaluate the depth   of his ulcer.  10.  Deep venous thrombosis prophylaxis, heparin.  11.  He will be admitted under inpatient status to the ICU as he is critically   ill with evidence of end-organ dysfunction in the setting of hypotension and   sepsis.  He will require greater than 2 midnights.    Thirty-five minutes of critical care time were spent including examination and   interview of the patient, explanation of prognosis,  diagnosis, and plan of   care, direction of nursing staff and discussion of the patient with other   physicians involved this time with independent procedures performed, greater   than 50% of which was spent at the bedside.    His code status is full.       ____________________________________     DO RUBIO Gu / ALEX    DD:  05/04/2017 01:35:38  DT:  05/04/2017 02:44:21    D#:  [id number]  Job#:  [id number]

## 2017-05-04 NOTE — ED PROVIDER NOTES
ED Provider Note    Scribed for Karan Day D.O. by Macarena Zamora. 5/3/2017  6:37 PM    Primary care provider: Mohsen Tamasaby, M.D.  Means of arrival: Ambulance   History obtained from: Patient   History limited by: None     CHIEF COMPLAINT  Chief Complaint   Patient presents with   • Syncope     Patient arrives to ed via EMS. EMS reports that patient had a syncopable episode and hit his head. Patient has been c/o abdominal pain with nausea and dizziness all day today. Patient was hypotensive upon ems arrival, unable to get a BP. After 1 liter of IVF patients bp 80's   • Abdominal Pain   • Hypotension       HPI  Satya Aguilar is a 61 y.o. male who presents to the Emergency Department brought in by EMS after a syncopal episode earlier today. Patient states he felt dizzy then fell, noting positive head trauma during the fall. He also notes mild neck pain onset after the syncopal episode. He states he has been experiencing worsening abdominal pain and nausea throughout the day today. Patient has never experienced similar symptoms previously. Negative vomiting, diarrhea, chills, chest pain, or shortness of breath.  He denies paresthesias.     REVIEW OF SYSTEMS  Review of Systems   Constitutional: Negative for chills.   HENT:        Positive head trauma.    Respiratory: Negative for shortness of breath.    Cardiovascular: Negative for chest pain.   Gastrointestinal: Positive for nausea and abdominal pain. Negative for vomiting and diarrhea.   Musculoskeletal: Positive for falls and neck pain.   Neurological: Positive for dizziness.        Positive syncopal episode.    All other systems reviewed and are negative.  C     PAST MEDICAL HISTORY   has a past medical history of Hypertension; Diabetes (CMS-HCC); Asthma; Sleep apnea; and High cholesterol.    SURGICAL HISTORY  patient denies any surgical history    SOCIAL HISTORY  Social History   Substance Use Topics   • Smoking status: Never Smoker    • Smokeless tobacco:  "None   • Alcohol Use: Yes      Comment: occ      History   Drug Use   • Yes   • Special: Inhaled     Comment: marijuana daily       FAMILY HISTORY  History reviewed. No pertinent family history.    CURRENT MEDICATIONS  Home Medications     Reviewed by Mitali Steven R.N. (Registered Nurse) on 05/03/17 at 1813  Med List Status: Not Addressed    Medication Last Dose Status    albuterol 108 (90 BASE) MCG/ACT Aero Soln inhalation aerosol  Active    ascorbic acid (VITAMIN C) 500 MG tablet  Active    budesonide-formoterol (SYMBICORT) 160-4.5 MCG/ACT Aerosol  Active    citalopram (CELEXA) 10 MG tablet 2/20/2017 Active    ferrous sulfate 325 (65 FE) MG tablet  Active    gabapentin (NEURONTIN) 300 MG Cap 2/20/2017 Active    glyBURIDE (DIABETA) 2.5 MG Tab 2/20/2017 Active    lovastatin (MEVACOR) 20 MG Tab 2/20/2017 Active    metformin (GLUCOPHAGE) 1000 MG tablet 2/20/2017 Active    Omega-3 Fatty Acids (FISH OIL) 1000 MG Cap capsule 2/20/2017 Active    risperidone (RISPERDAL) 3 MG Tab 2/20/2017 Active    risperidone microspheres (RISPERDAL CONSTA) 37.5 MG injection 2/10/2016 Active    tiotropium (SPIRIVA) 18 MCG Cap  Active                ALLERGIES  No Known Allergies    PHYSICAL EXAM  VITAL SIGNS: BP 76/41 mmHg  Pulse 82  Temp(Src) 37.2 °C (98.9 °F)  Resp 17  Ht 1.905 m (6' 3\")  Wt 92.08 kg (203 lb)  BMI 25.37 kg/m2  SpO2 96%    Constitutional: Well developed, well nourished. No acute distress.  HEENT: Normocephalic, atraumatic. Posterior pharynx clear and moist.  Eyes:  EOMI. Normal sclera.  Neck: Supple, Full range of motion, nontender.  Chest/Pulmonary: clear to ausculation. Symmetrical expansion. Diminished breath sounds.   Cardio: Regular rate and rhythm with no murmur.   Abdomen: Soft. No peritoneal signs. No guarding. No palpable masses. Diffusely tender abdomen.   Back: No CVA tenderness, nontender midline, no step offs.  Musculoskeletal: No deformity, no edema, neurovascular intact.   Neuro: Clear speech, " appropriate, cooperative, cranial nerves II-XII grossly intact.  Psych: Normal mood and affect    LABS  Results for orders placed or performed during the hospital encounter of 05/03/17   CBC WITH DIFFERENTIAL   Result Value Ref Range    WBC 24.4 (H) 4.8 - 10.8 K/uL    RBC 4.98 4.70 - 6.10 M/uL    Hemoglobin 14.2 14.0 - 18.0 g/dL    Hematocrit 44.1 42.0 - 52.0 %    MCV 88.6 81.4 - 97.8 fL    MCH 28.5 27.0 - 33.0 pg    MCHC 32.2 (L) 33.7 - 35.3 g/dL    RDW 41.3 35.9 - 50.0 fL    Platelet Count 314 164 - 446 K/uL    MPV 10.6 9.0 - 12.9 fL    Neutrophils-Polys 86.70 (H) 44.00 - 72.00 %    Lymphocytes 7.60 (L) 22.00 - 41.00 %    Monocytes 3.70 0.00 - 13.40 %    Eosinophils 0.80 0.00 - 6.90 %    Basophils 0.50 0.00 - 1.80 %    Immature Granulocytes 0.70 0.00 - 0.90 %    Nucleated RBC 0.00 /100 WBC    Neutrophils (Absolute) 21.12 (H) 1.82 - 7.42 K/uL    Lymphs (Absolute) 1.86 1.00 - 4.80 K/uL    Monos (Absolute) 0.90 (H) 0.00 - 0.85 K/uL    Eos (Absolute) 0.19 0.00 - 0.51 K/uL    Baso (Absolute) 0.11 0.00 - 0.12 K/uL    Immature Granulocytes (abs) 0.18 (H) 0.00 - 0.11 K/uL    NRBC (Absolute) 0.00 K/uL   COMP METABOLIC PANEL   Result Value Ref Range    Sodium 135 135 - 145 mmol/L    Potassium 6.3 (H) 3.6 - 5.5 mmol/L    Chloride 104 96 - 112 mmol/L    Co2 22 20 - 33 mmol/L    Anion Gap 9.0 0.0 - 11.9    Glucose 117 (H) 65 - 99 mg/dL    Bun 33 (H) 8 - 22 mg/dL    Creatinine 2.66 (H) 0.50 - 1.40 mg/dL    Calcium 8.6 8.5 - 10.5 mg/dL    AST(SGOT) 10 (L) 12 - 45 U/L    ALT(SGPT) 5 2 - 50 U/L    Alkaline Phosphatase 66 30 - 99 U/L    Total Bilirubin 0.5 0.1 - 1.5 mg/dL    Albumin 3.3 3.2 - 4.9 g/dL    Total Protein 6.2 6.0 - 8.2 g/dL    Globulin 2.9 1.9 - 3.5 g/dL    A-G Ratio 1.1 g/dL   LIPASE   Result Value Ref Range    Lipase 49 11 - 82 U/L   TROPONIN   Result Value Ref Range    Troponin I <0.01 0.00 - 0.04 ng/mL   BTYPE NATRIURETIC PEPTIDE   Result Value Ref Range    B Natriuretic Peptide 16 0 - 100 pg/mL   PT/INR   Result  Value Ref Range    PT 13.3 12.0 - 14.6 sec    INR 0.98 0.87 - 1.13   COD (ADULT)   Result Value Ref Range    ABO Grouping Only A     Rh Grouping Only POS     Antibody Screen-Cod NEG    LACTIC ACID   Result Value Ref Range    Lactic Acid 2.4 (H) 0.5 - 2.0 mmol/L   ESTIMATED GFR   Result Value Ref Range    GFR If African American 30 (A) >60 mL/min/1.73 m 2    GFR If Non African American 25 (A) >60 mL/min/1.73 m 2   ACCU-CHEK GLUCOSE   Result Value Ref Range    Glucose - Accu-Ck 104 (H) 65 - 99 mg/dL   EKG (ER)   Result Value Ref Range    Report       [facility] Emergency Dept.    Test Date:  2017  Pt Name:    BONIFACIO RESENDIZ                Department: ER  MRN:        [id number]                      Room:       [facility]  Gender:     M                            Technician: [id number]  :        [id number]                   Requested By:LENNY KEANE  Order #:    [id number]                    Reading MD:    Measurements  Intervals                                Axis  Rate:       75                           P:          82  NY:         156                          QRS:        -22  QRSD:       74                           T:          57  QT:         368  QTc:        411    Interpretive Statements  SINUS RHYTHM  BORDERLINE LEFT AXIS DEVIATION  BORDERLINE R WAVE PROGRESSION, ANTERIOR LEADS  Compared to ECG 2017 20:18:27  No significant changes     All labs reviewed by me.    EKG  Normal sinus rhythm at a rate of 75. No ST elevations or depressions. No ectopy noted. No old EKG for comparison. As interpreted by me.     RADIOLOGY  DX-CHEST-PORTABLE (1 VIEW)   Final Result      No acute cardiopulmonary abnormality. No change.      CT-THORACIC AORTA EVALUATION   Final Result      1.  No evidence of aortic dissection. No aneurysmal dilatation. No mediastinal or retroperitoneal hematoma.   2.  Atelectasis within both lungs. No pleural effusions.   3.  Distended small bowel loops with wall edema left-sided  abdomen consistent with enteritis/possibly Crohn's. Infection not excluded.   4.  Small free fluid collections within the abdomen and pelvis.   5.  Diverticulosis of the colon without evidence diverticulitis.   6.  Hepatic steatosis.   7.  Tiny left adrenal gland nodules.   8.  Question tiny calcified gallstones.         CT-CSPINE WITHOUT PLUS RECONS   Final Result      No acute cervical spine fracture identified.      CT-HEAD W/O   Final Result      No evidence of acute intracranial process.      DX-CHEST-PORTABLE (1 VIEW)    (Results Pending)   DX-FOOT-2- RIGHT    (Results Pending)   The radiologist's interpretation of all radiological studies have been reviewed by me.    COURSE & MEDICAL DECISION MAKING  Pertinent Labs & Imaging studies reviewed. (See chart for details)    6:37 PM - Patient seen and examined at bedside. Patient will be treated with IV fluids for hypotension and Esmolol 2500mg/250 mL IV. Ordered CT thoracic aorta, CT head, CT C-spine, blood culture, lactic acid, PT/INR, COD, CBC with differential, CMP, lipase, troponin, BNP, accu-check glucose, and EKG to evaluate his symptoms. The differential diagnoses include but are not limited to: aortic dissection, abdominal aortic embolism, sepsis, myocardial infarction, pneumonia, pulmonary embolism. Patient's blood pressure was 79/43 right and 76/45 left.     Pt was activated as a code aorta secondary to his hypotension, abdominal pain, and nausea/vomiting.     8:52 PM Spoke with Dr. Acevedo, hospitalist, concerning patient case. Agreed to admit patient for further treatment and evaluation.     8:55 PM Recheck: Patient is resting comfortably and reports feeling improved. I updated him on his results and explained that he will be admitted for further care and evaluation. He understands and agrees.      CRITICAL CARE  The very real possibilty of a deterioration of this patient's condition required the highest level of my preparedness for sudden, emergent  intervention.  I provided critical care services, which included medication orders, frequent reevaluations of the patient's condition and response to treatment, ordering and reviewing test results, and discussing the case with various consultants.  The critical care time associated with the care of the patient was 35 minutes. Review chart for interventions. This time is exclusive of any other billable procedures.       DISPOSITION:  Patient will be admitted to Dr. Acevedo in guarded condition.      FINAL IMPRESSION  1. Syncope, unspecified syncope type    2. Abdominal pain, unspecified location     Critical care time of 35 minutes performed.      Macarena RODRIGUEZ ([employer]), am scribing for, and in the presence of, Karan Day D.O..    Electronically signed by: Macarena Zamora ([place]), 5/3/2017    Karan RODRIGUEZ D.O. personally performed the services described in this documentation, as scribed by Macarena Zamora in my presence, and it is both accurate and complete.    The note accurately reflects work and decisions made by me.  Karan Day  5/4/2017  1:28 AM

## 2017-05-04 NOTE — PROGRESS NOTES
Rapid Response called for pt hypotensive with SBP in 60's. Dr. Acevedo paged. 4th Liter bolus started per rapid protocol. Pt able to answer orientation questions, very lethargic, wont open eyes when talking, mumbles words. Pt has labored breathing in abdomen and chest. Pt complaining 10/10 pain in abdomen and in leg.

## 2017-05-04 NOTE — PROGRESS NOTES
Bedside report received from tele RN. Pt transported to RICU via bed on telemetry monitor escorted by ACLS RN and CCT and transferred to ICU bed. Assessment complete per OBS. Plan of care and patient condition reviewed. LDA's and IVF verified. Patient has no complaints of pain at this time.  Pt belongings placed in closet, per patient glasses are at home and he does not have hearing aides or dentures. Admit profile unable to complete due to patient status. Pt repositioned for comfort. Call light within reach. Bed in lowest, locked position with bed alarm on. Will continue to monitor.

## 2017-05-04 NOTE — ED NOTES
Chief Complaint   Patient presents with   • Syncope     Patient arrives to ed via EMS. EMS reports that patient had a syncopable episode and hit his head. Patient has been c/o abdominal pain with nausea and dizziness all day today. Patient was hypotensive upon ems arrival, unable to get a BP. After 1 liter of IVF patients bp 80's   • Abdominal Pain   • Hypotension

## 2017-05-04 NOTE — ED NOTES
IV abx completed. Pt using urinal. Pt resting comfortably. Pt waiting for bed assignment at this time.

## 2017-05-04 NOTE — PROCEDURES
Procedure Note    Date: 5/4/2017  Time: 1230    Procedure: Central Venous Line placement  Site: Right internal jugular vein    Indication: Septic shock  Consent: Informed consent obtained from patient or designated decision maker after explaining the benefits/risks of the procedure including but not limited to bleeding, infection, nerve or other deep structure injury, pneumothorax/hemothorax, arrythmia, or death. Patient or surrogate expressed understanding and agreement and signed consent which can be found in the patient's chart.    Procedure: After obtaining consent, a time-out was performed. Appropriate site confirmed with ultrasound and patient positioned, prepped, and draped in sterile fashion. All those present wearing cap and mask and those physically participating remained adhering to sterile fashion with cap, mask, gloves, and gown. 5 mL of local anesthetic injected (1% lidocaine without epinephrine) achieving appropriate comfort level for patient. Vein localized and accessed using continuous ultrasound guidance and a 7 Fr 16cm triple lumen catheter placed using Seldinger technique. Able to aspirate dark, non-pulsatile blood through each lumen and sterile saline flushed easily before capping. Line secured and dressed in sterile fashion. Patient tolerated procedure well without any difficulties and remains in care of bedside nurse. CXR will be performed to confirm appropriate placement for internal jugular or subclavian CVLs.    EBL: minimal  Complications: none  CXR: No PTX noted, good position    Kyung Eaton MD  Pulmonary and Critical Care Medicine

## 2017-05-05 ENCOUNTER — APPOINTMENT (OUTPATIENT)
Dept: RADIOLOGY | Facility: MEDICAL CENTER | Age: 62
DRG: 871 | End: 2017-05-05
Attending: HOSPITALIST
Payer: COMMERCIAL

## 2017-05-05 PROBLEM — I48.91 ATRIAL FIBRILLATION (HCC): Status: ACTIVE | Noted: 2017-05-05

## 2017-05-05 PROBLEM — I48.0 PAROXYSMAL ATRIAL FIBRILLATION (HCC): Status: ACTIVE | Noted: 2017-05-05

## 2017-05-05 PROBLEM — E11.9 DM2 (DIABETES MELLITUS, TYPE 2) (HCC): Status: ACTIVE | Noted: 2017-05-05

## 2017-05-05 LAB
ALBUMIN SERPL BCP-MCNC: 3.2 G/DL (ref 3.2–4.9)
ALBUMIN/GLOB SERPL: 1.2 G/DL
ALP SERPL-CCNC: 53 U/L (ref 30–99)
ALT SERPL-CCNC: 5 U/L (ref 2–50)
ANION GAP SERPL CALC-SCNC: 5 MMOL/L (ref 0–11.9)
AST SERPL-CCNC: 8 U/L (ref 12–45)
BASOPHILS # BLD AUTO: 0.5 % (ref 0–1.8)
BASOPHILS # BLD: 0.07 K/UL (ref 0–0.12)
BILIRUB SERPL-MCNC: 0.4 MG/DL (ref 0.1–1.5)
BUN SERPL-MCNC: 11 MG/DL (ref 8–22)
CALCIUM SERPL-MCNC: 8.6 MG/DL (ref 8.5–10.5)
CHLORIDE SERPL-SCNC: 105 MMOL/L (ref 96–112)
CO2 SERPL-SCNC: 25 MMOL/L (ref 20–33)
CREAT SERPL-MCNC: 1 MG/DL (ref 0.5–1.4)
DEPRECATED D DIMER PPP IA-ACNC: 3308 NG/ML(D-DU)
EKG IMPRESSION: NORMAL
EOSINOPHIL # BLD AUTO: 0.77 K/UL (ref 0–0.51)
EOSINOPHIL NFR BLD: 6 % (ref 0–6.9)
ERYTHROCYTE [DISTWIDTH] IN BLOOD BY AUTOMATED COUNT: 42.7 FL (ref 35.9–50)
GFR SERPL CREATININE-BSD FRML MDRD: >60 ML/MIN/1.73 M 2
GLOBULIN SER CALC-MCNC: 2.7 G/DL (ref 1.9–3.5)
GLUCOSE BLD-MCNC: 114 MG/DL (ref 65–99)
GLUCOSE BLD-MCNC: 143 MG/DL (ref 65–99)
GLUCOSE BLD-MCNC: 152 MG/DL (ref 65–99)
GLUCOSE BLD-MCNC: 157 MG/DL (ref 65–99)
GLUCOSE SERPL-MCNC: 124 MG/DL (ref 65–99)
HCT VFR BLD AUTO: 38.5 % (ref 42–52)
HGB BLD-MCNC: 11.9 G/DL (ref 14–18)
IMM GRANULOCYTES # BLD AUTO: 0.07 K/UL (ref 0–0.11)
IMM GRANULOCYTES NFR BLD AUTO: 0.5 % (ref 0–0.9)
LYMPHOCYTES # BLD AUTO: 2.2 K/UL (ref 1–4.8)
LYMPHOCYTES NFR BLD: 17.2 % (ref 22–41)
MAGNESIUM SERPL-MCNC: 1.6 MG/DL (ref 1.5–2.5)
MCH RBC QN AUTO: 28.3 PG (ref 27–33)
MCHC RBC AUTO-ENTMCNC: 30.9 G/DL (ref 33.7–35.3)
MCV RBC AUTO: 91.4 FL (ref 81.4–97.8)
MONOCYTES # BLD AUTO: 0.65 K/UL (ref 0–0.85)
MONOCYTES NFR BLD AUTO: 5.1 % (ref 0–13.4)
NEUTROPHILS # BLD AUTO: 9.04 K/UL (ref 1.82–7.42)
NEUTROPHILS NFR BLD: 70.7 % (ref 44–72)
NRBC # BLD AUTO: 0 K/UL
NRBC BLD AUTO-RTO: 0 /100 WBC
PHOSPHATE SERPL-MCNC: 2.8 MG/DL (ref 2.5–4.5)
PLATELET # BLD AUTO: 252 K/UL (ref 164–446)
PMV BLD AUTO: 10.8 FL (ref 9–12.9)
POTASSIUM SERPL-SCNC: 4.6 MMOL/L (ref 3.6–5.5)
PROT SERPL-MCNC: 5.9 G/DL (ref 6–8.2)
RBC # BLD AUTO: 4.21 M/UL (ref 4.7–6.1)
SODIUM SERPL-SCNC: 135 MMOL/L (ref 135–145)
T4 FREE SERPL-MCNC: 0.7 NG/DL (ref 0.53–1.43)
TROPONIN I SERPL-MCNC: <0.01 NG/ML (ref 0–0.04)
TSH SERPL DL<=0.005 MIU/L-ACNC: 0.53 UIU/ML (ref 0.3–3.7)
WBC # BLD AUTO: 12.8 K/UL (ref 4.8–10.8)

## 2017-05-05 PROCEDURE — 700102 HCHG RX REV CODE 250 W/ 637 OVERRIDE(OP): Performed by: INTERNAL MEDICINE

## 2017-05-05 PROCEDURE — 700102 HCHG RX REV CODE 250 W/ 637 OVERRIDE(OP): Performed by: HOSPITALIST

## 2017-05-05 PROCEDURE — 99233 SBSQ HOSP IP/OBS HIGH 50: CPT | Performed by: INTERNAL MEDICINE

## 2017-05-05 PROCEDURE — 84443 ASSAY THYROID STIM HORMONE: CPT

## 2017-05-05 PROCEDURE — 93005 ELECTROCARDIOGRAM TRACING: CPT | Performed by: INTERNAL MEDICINE

## 2017-05-05 PROCEDURE — 700105 HCHG RX REV CODE 258: Performed by: HOSPITALIST

## 2017-05-05 PROCEDURE — 83735 ASSAY OF MAGNESIUM: CPT

## 2017-05-05 PROCEDURE — 82962 GLUCOSE BLOOD TEST: CPT

## 2017-05-05 PROCEDURE — 700111 HCHG RX REV CODE 636 W/ 250 OVERRIDE (IP): Performed by: PHARMACIST

## 2017-05-05 PROCEDURE — 700111 HCHG RX REV CODE 636 W/ 250 OVERRIDE (IP): Performed by: INTERNAL MEDICINE

## 2017-05-05 PROCEDURE — 84100 ASSAY OF PHOSPHORUS: CPT

## 2017-05-05 PROCEDURE — A9270 NON-COVERED ITEM OR SERVICE: HCPCS | Performed by: INTERNAL MEDICINE

## 2017-05-05 PROCEDURE — 93010 ELECTROCARDIOGRAM REPORT: CPT | Performed by: INTERNAL MEDICINE

## 2017-05-05 PROCEDURE — 700105 HCHG RX REV CODE 258: Performed by: INTERNAL MEDICINE

## 2017-05-05 PROCEDURE — 700111 HCHG RX REV CODE 636 W/ 250 OVERRIDE (IP): Performed by: HOSPITALIST

## 2017-05-05 PROCEDURE — 84484 ASSAY OF TROPONIN QUANT: CPT

## 2017-05-05 PROCEDURE — 770020 HCHG ROOM/CARE - TELE (206)

## 2017-05-05 PROCEDURE — 700105 HCHG RX REV CODE 258: Performed by: PHARMACIST

## 2017-05-05 PROCEDURE — 80053 COMPREHEN METABOLIC PANEL: CPT

## 2017-05-05 PROCEDURE — 84439 ASSAY OF FREE THYROXINE: CPT

## 2017-05-05 PROCEDURE — 85025 COMPLETE CBC W/AUTO DIFF WBC: CPT

## 2017-05-05 PROCEDURE — 74000 DX-ABDOMEN-1 VIEW: CPT

## 2017-05-05 PROCEDURE — 85379 FIBRIN DEGRADATION QUANT: CPT

## 2017-05-05 PROCEDURE — A9270 NON-COVERED ITEM OR SERVICE: HCPCS | Performed by: HOSPITALIST

## 2017-05-05 PROCEDURE — 99233 SBSQ HOSP IP/OBS HIGH 50: CPT | Performed by: HOSPITALIST

## 2017-05-05 RX ORDER — MAGNESIUM SULFATE HEPTAHYDRATE 40 MG/ML
4 INJECTION, SOLUTION INTRAVENOUS ONCE
Status: COMPLETED | OUTPATIENT
Start: 2017-05-05 | End: 2017-05-05

## 2017-05-05 RX ADMIN — AMIODARONE HYDROCHLORIDE 150 MG: 50 INJECTION, SOLUTION INTRAVENOUS at 02:38

## 2017-05-05 RX ADMIN — INSULIN LISPRO 1 UNITS: 100 INJECTION, SOLUTION INTRAVENOUS; SUBCUTANEOUS at 11:26

## 2017-05-05 RX ADMIN — PIPERACILLIN SODIUM AND TAZOBACTAM SODIUM 3.38 G: 3; .375 INJECTION, POWDER, FOR SOLUTION INTRAVENOUS at 14:03

## 2017-05-05 RX ADMIN — MAGNESIUM SULFATE IN WATER 4 G: 40 INJECTION, SOLUTION INTRAVENOUS at 10:12

## 2017-05-05 RX ADMIN — STANDARDIZED SENNA CONCENTRATE AND DOCUSATE SODIUM 2 TABLET: 8.6; 5 TABLET, FILM COATED ORAL at 21:13

## 2017-05-05 RX ADMIN — CITALOPRAM HYDROBROMIDE 10 MG: 20 TABLET ORAL at 08:13

## 2017-05-05 RX ADMIN — LOVASTATIN 40 MG: 20 TABLET ORAL at 21:13

## 2017-05-05 RX ADMIN — AMIODARONE HYDROCHLORIDE 1 MG/MIN: 50 INJECTION, SOLUTION INTRAVENOUS at 02:54

## 2017-05-05 RX ADMIN — ENOXAPARIN SODIUM 100 MG: 100 INJECTION SUBCUTANEOUS at 23:01

## 2017-05-05 RX ADMIN — MORPHINE SULFATE 4 MG: 4 INJECTION INTRAVENOUS at 02:26

## 2017-05-05 RX ADMIN — INSULIN LISPRO 1 UNITS: 100 INJECTION, SOLUTION INTRAVENOUS; SUBCUTANEOUS at 16:52

## 2017-05-05 RX ADMIN — SODIUM CHLORIDE: 9 INJECTION, SOLUTION INTRAVENOUS at 01:31

## 2017-05-05 RX ADMIN — SODIUM CHLORIDE: 9 INJECTION, SOLUTION INTRAVENOUS at 08:39

## 2017-05-05 RX ADMIN — PIPERACILLIN AND TAZOBACTAM 2.25 G: 2; .25 INJECTION, POWDER, LYOPHILIZED, FOR SOLUTION INTRAVENOUS; PARENTERAL at 03:03

## 2017-05-05 RX ADMIN — PIPERACILLIN SODIUM AND TAZOBACTAM SODIUM 3.38 G: 3; .375 INJECTION, POWDER, FOR SOLUTION INTRAVENOUS at 22:02

## 2017-05-05 RX ADMIN — HEPARIN SODIUM 5000 UNITS: 5000 INJECTION, SOLUTION INTRAVENOUS; SUBCUTANEOUS at 05:46

## 2017-05-05 RX ADMIN — SODIUM CHLORIDE: 9 INJECTION, SOLUTION INTRAVENOUS at 20:56

## 2017-05-05 RX ADMIN — STANDARDIZED SENNA CONCENTRATE AND DOCUSATE SODIUM 2 TABLET: 8.6; 5 TABLET, FILM COATED ORAL at 08:13

## 2017-05-05 RX ADMIN — RISPERIDONE 3 MG: 0.5 TABLET, FILM COATED ORAL at 22:01

## 2017-05-05 RX ADMIN — GABAPENTIN 400 MG: 400 CAPSULE ORAL at 13:56

## 2017-05-05 RX ADMIN — METOPROLOL TARTRATE 25 MG: 25 TABLET, FILM COATED ORAL at 10:12

## 2017-05-05 RX ADMIN — GABAPENTIN 400 MG: 400 CAPSULE ORAL at 21:12

## 2017-05-05 RX ADMIN — GABAPENTIN 400 MG: 400 CAPSULE ORAL at 08:13

## 2017-05-05 RX ADMIN — HEPARIN SODIUM 5000 UNITS: 5000 INJECTION, SOLUTION INTRAVENOUS; SUBCUTANEOUS at 13:55

## 2017-05-05 RX ADMIN — PIPERACILLIN AND TAZOBACTAM 2.25 G: 2; .25 INJECTION, POWDER, LYOPHILIZED, FOR SOLUTION INTRAVENOUS; PARENTERAL at 08:13

## 2017-05-05 RX ADMIN — TIOTROPIUM BROMIDE 1 CAPSULE: 18 CAPSULE ORAL; RESPIRATORY (INHALATION) at 08:13

## 2017-05-05 ASSESSMENT — PAIN SCALES - GENERAL
PAINLEVEL_OUTOF10: 10
PAINLEVEL_OUTOF10: 0
PAINLEVEL_OUTOF10: 10

## 2017-05-05 ASSESSMENT — ENCOUNTER SYMPTOMS
HEADACHES: 0
PSYCHIATRIC NEGATIVE: 1
COUGH: 0
DIARRHEA: 0
FEVER: 0
HEARTBURN: 0
WEAKNESS: 1
CHILLS: 0
RESPIRATORY NEGATIVE: 1
CARDIOVASCULAR NEGATIVE: 1
ABDOMINAL PAIN: 1
EYES NEGATIVE: 1
MUSCULOSKELETAL NEGATIVE: 1

## 2017-05-05 NOTE — PROGRESS NOTES
Pulmonary Critical Care Progress Note        DOS:  5/5/2017    Chief Complaint:  Syncope    History of Present Illness:   The patient is a very pleasant 61-year-old male with a past medical history significant for diabetes as well as asthma and hypertension who was brought to the emergency department by emergency medical services after having a syncopal episode earlier on the day of admission.  The patient reports that he stood up and felt lightheaded and then fell hitting    his head.  He also complained of mild neck pain after the fainting spell.   Throughout the day, the patient has been having worsening diffuse generalized abdominal pain as well as nausea.  He denies any vomiting, diarrhea, chills, chest pain or shortness of breath prior to his syncopal episodes.  Denies a history of similar symptoms.  Patient denies any recent sick contacts or diarrheal illnesses.    Patient was originally admitted to the telemetry floor for acute kidney injury along with mild hypotension and what seems to be acute colitis.  However, after 4 liters of IV fluids, the patient still had a systolic blood pressure in the 70s requiring a rapid response and subsequent transfer to the ICU for continued management.  In the ICU, central line was placed and pressors were started as well as the sepsis protocol.      ROS:  Respiratory: positive cough, negative sputum production and positive wheezing  Cardiac: negative chest pain and negative palpitations  GI: positive nausea, negative vomiting, positive abdominal pain and negative diarrhea.        Interval Events:  24 hour interval history reviewed     Alert and participating  Complains of spasms to right leg and tremors to upper extremities intermittently - sounds radicular?  Not resolved with pain meds.  SR, with A fib at 2 am while sleeping.  Amio gtts with conversion to SR this AM. BP noted   US yesterday of distended abdomen was negative  Tmax 101.4.  WBC markedly improved.   ,  amio off now.   Pressors off since yesterday.   incentive spirometry 7670-0765 mL  CXR with clear lung fields.     PFSH:  No change.      Respiratory:     Pulse Oximetry: 97 % on room air.   Exam: WOB is minimal on room air. Clear with inspiration with few scattered wheezes on expiration.   ImagingAvailable data reviewed   CXR reviewed with team.   incentive spirometry 2840-8082 mL        Invalid input(s): [id number]    HemoDynamics:  Pulse: 67, Heart Rate (Monitored): 79  NIBP: 101/57 mmHg  CVP (mm Hg): (!) 9 MM HG  Exam: SR, RRR, no murmur. No edema. Extremities are warm, pink and perfusing.  Imaging: Available data reviewed     Recent Labs      05/03/17   1818   TROPONINI  <0.01   BNPBTYPENAT  16       Neuro:  GCS Total Severance Coma Score: 15  Exam: A&O X4, Conversing. NFE.   No pain right hip or thigh to palpation or stressing muscles.   No spine tenderness.   Imaging: Available data reviewed      Fluids:  Intake/Output       05/03/17 0700 - 05/04/17 0659 05/04/17 0700 - 05/05/17 0659 05/05/17 0700 - 05/06/17 0659      3961-0022 2333-1764 Total 3652-6743 1266-0762 Total 7967-1915 0767-8149 Total       Intake    P.O.  --  0 0  540  480 1020  --  -- --    P.O. -- 0 0  -- -- --    I.V.  --  5420.1 5420.1  1729.1  1625 3354.1  --  -- --    Vasopressin Volume -- 0 0 -- -- -- -- -- --    Norepinephrine Volume -- 103.1 103.1 159.1 0 159.1 -- -- --    Amiodarone -- -- -- -- 90 90 -- -- --    IV Volume (NS BOLUS) -- 4500 4500 -- -- -- -- -- --    IV Volume (NS TKO) -- 20 20 120 85 205 -- -- --    IV Volume (NS MIVF) --  1250 2500 -- -- --    IV Piggyback Volume (IV Piggyback) -- 210 210 200 200 400 -- -- --    Total Intake -- 5420.1 5420.1 2269.1 2105 4374.1 -- -- --       Output    Urine  --  550 550  2950  1575 4525  --  -- --    Indwelling Cathether --  1575 4525 -- -- --    Void (ml) -- 0 0 -- -- -- -- -- --    Stool  --  -- --  --  -- --  --  -- --    Number of Times Stooled  -- 0 x 0 x 2 x 0 x 2 x -- -- --    Total Output --  1575 4597 -- -- --       Net I/O     -- 4870.1 4870.1 -680.9 530 -150.9 -- -- --           Recent Labs      17   0330   SODIUM  132*  134*  135   POTASSIUM  7.6*  5.8*  4.6   CHLORIDE  104  107  105   CO2  23  22  25   BUN  34*  33*  11   CREATININE  2.38*  1.98*  1.00   MAGNESIUM   --   1.8  1.6   PHOSPHORUS   --   3.9  2.8   CALCIUM  7.9*  7.7*  8.6       GI/Nutrition:  Exam:  Mild tenderness to the left lower abdomen and over bladder. Hyperactive bowel sounds. No CVA tenderness.   Imaging: Available data reviewed  taking PO       Liver Function  Recent Labs      17   0330   ALTSGPT  5  5   --   5   ASTSGOT  10*  10*   --   8*   ALKPHOSPHAT  66  69   --   53   TBILIRUBIN  0.5  0.5   --   0.4   LIPASE  49   --    --    --    GLUCOSE  117*  139*  157*  124*       Heme:  Recent Labs      17   0330   RBC  4.98  4.89  4.72  4.21*   HEMOGLOBIN  14.2  13.9*  13.3*  11.9*   HEMATOCRIT  44.1  44.1  43.2  38.5*   PLATELETCT  314  323  317  252   PROTHROMBTM  13.3   --    --    --    INR  0.98   --    --    --        Infectious Disease:  Temp  Av.1 °C (98.7 °F)  Min: 36.4 °C (97.6 °F)  Max: 38.6 °C (101.4 °F)  Micro: reviewed   Exam: small 1 cm diabetic ulcers on the 1st, 2nd and 4th toes and under the 3rd and 4th toes of the right foot that do not appear to be infected. No diabetic ulcers to the left foot.     Recent Labs      1717   0330   WBC  24.4*  24.6*  23.1*  12.8*   NEUTSPOLYS  86.70*  85.80*   --   70.70   LYMPHOCYTES  7.60*  9.30*   --   17.20*   MONOCYTES  3.70  3.80   --   5.10   EOSINOPHILS  0.80  0.30   --   6.00   BASOPHILS  0.50  0.20   --   0.50   ASTSGOT  10*  10*   --   8*   ALTSGPT  5  5   --   5   ALKPHOSPHAT  66  69   --   53    TBILIRUBIN  0.5  0.5   --   0.4     Current Facility-Administered Medications   Medication Dose Frequency Provider Last Rate Last Dose   • amiodarone (CORDARONE) 450 mg in D5W 250 mL Infusion  0.5-1 mg/min Continuous Bruce Trejo PHARMD 33 mL/hr at 05/05/17 0254 1 mg/min at 05/05/17 0254   • NS infusion   Continuous Kyung Eaton M.D. 125 mL/hr at 05/05/17 0131     • Respiratory Care per Protocol   Continuous RT Dev Cardenas M.D.       • albuterol (PROVENTIL) 2.5mg/0.5ml nebulizer solution 2.5 mg  2.5 mg Q4H PRN (RT) KRZYSZTOF Mcpherson.O.       • insulin lispro (HUMALOG) injection 1-6 Units  1-6 Units 4X/DAY ACHS Kyung Eaton M.D.   1 Units at 05/04/17 2114   • piperacillin-tazobactam (ZOSYN) 2.25 g in  mL IVPB  2.25 g Q6HRS Kiara Acevedo D.O.   Stopped at 05/05/17 0333   • albuterol inhaler 2 Puff  2 Puff Q HOUR PRN KRZYSZTOF Mcpherson.O.       • citalopram (CELEXA) tablet 10 mg  10 mg DAILY KRZYSZTOF Mcpherson.O.   Stopped at 05/04/17 0900   • gabapentin (NEURONTIN) capsule 400 mg  400 mg TID Kiara Acevedo, D.O.   400 mg at 05/04/17 2104   • lovastatin (MEVACOR) tablet 40 mg  40 mg Nightly Kiara Acevedo D.O.   40 mg at 05/04/17 2104   • risperidone (RISPERDAL) tablet 3 mg  3 mg Q EVENING KRZYSZTOF Mcpherson.O.   3 mg at 05/04/17 2104   • tiotropium (SPIRIVA) 18 MCG inhalation capsule 1 Cap  1 Cap DAILY KRZYSZTOF Mcpherson.O.   1 Cap at 05/04/17 1151   • NS infusion 2,763 mL  30 mL/kg Once PRN KRZYSZTOF Mcpherson.O.       • senna-docusate (PERICOLACE or SENOKOT S) 8.6-50 MG per tablet 2 Tab  2 Tab BID MAIRANN McphersonO.   2 Tab at 05/04/17 2104    And   • polyethylene glycol/lytes (MIRALAX) PACKET 1 Packet  1 Packet QDAY PRN KRZYSZTOF Mcpherson.O.        And   • magnesium hydroxide (MILK OF MAGNESIA) suspension 30 mL  30 mL QDAY PRN Kiara Acevedo D.O.        And   • bisacodyl (DULCOLAX) suppository 10 mg  10 mg QDAY PRN KRZYSZTOF Mcpherson.O.       • heparin injection  5,000 Units  5,000 Units Q8HRS KRZYSZTOF Mcpherson.O.   5,000 Units at 05/04/17 2105   • glucose 4 g chewable tablet 16 g  16 g Q15 MIN PRN Kiara Acevedo D.O.        And   • dextrose 50% (D50W) injection 25 mL  25 mL Q15 MIN PRN KRZYSZTOF Mcpherson.O.       • ondansetron (ZOFRAN) syringe/vial injection 4 mg  4 mg Q4HRS PRN KRZYSZTOF Mcpherson.O.   4 mg at 05/04/17 1113   • ondansetron (ZOFRAN ODT) dispertab 4 mg  4 mg Q4HRS PRN KRZYSZTOF Mcpherson.O.       • promethazine (PHENERGAN) tablet 12.5-25 mg  12.5-25 mg Q4HRS PRN KRZYSZTOF Mcpherson.O.       • promethazine (PHENERGAN) suppository 12.5-25 mg  12.5-25 mg Q4HRS PRN KRZYSZTOF Mcpherson.O.       • prochlorperazine (COMPAZINE) injection 5-10 mg  5-10 mg Q4HRS PRN KRZYSZTOF Mcpherson.O.       • oxycodone immediate-release (ROXICODONE) tablet 5 mg  5 mg Q3HRS PRN KRZYSZTOF Mcpherson.O.        And   • oxycodone immediate release (ROXICODONE) tablet 10 mg  10 mg Q3HRS PRN KRZYSZTOF Mcpherson.O.   10 mg at 05/04/17 1542    And   • morphine (pf) 4 mg/ml injection 4 mg  4 mg Q3HRS PRN KRZYSZTOF Mcpherson.O.   4 mg at 05/05/17 0226   • norepinephrine (LEVOPHED) 8 mg in  mL Infusion  0.5-30 mcg/min Continuous Kyung Eaton M.D.   Stopped at 05/04/17 1500   • vasopressin (VASOSTRICT) 20 Units in  mL Infusion  0.03 Units/min Continuous Kyung Eaton M.D.   Stopped at 05/04/17 0015     Last reviewed on 5/3/2017  9:50 PM by Orquidea Hubbard, PhT    Quality  Measures:  Labs reviewed, Radiology images reviewed and Medications reviewed  Yañez catheter: No Yañez                    Problems/plan:    1.  Severe sepsis with septic shock likely related to a gastrointestinal source.  2.  Acute kidney injury.  3.  Acute hyperkalemia.  4.  Acute small bowel enteritis.  5.  Acute leukocytosis.  6.  Acute mild lactic acidosis.  7.  Severe hypovolemia.  8.  Acute hypocalcemia.  9.  History of systemic arterial hypertension.  10.  History of type 2 diabetes.  11.   History of asthma.  12.  History of obstructive sleep apnea.  13.  History of marijuana dependence.    IV ABX  Await final cultures - de-escalate as appropriate  Replete Mg - 4 grams  RT/O2 protocols - DuoNeb  Watch for need for steroids for Bspasm  Resume home emds  IS/mobilize  Stop amiodarone drip  Query work up right low radicular back pain.   SSI - glycemic control  Ok to tele later today if stable  Prophylaxis    Discussed patient condition and risk of morbidity and/or mortality with RN, RT, Pharmacy, Charge nurse / hot rounds, Patient and hospitalist.    The patient remains critically ill.  Critical care time = 35 minutes in directly providing and coordinating critical care and extensive data review.  No time overlap and excludes procedures.    IAnn Marie (Scribe), am scribing for, and in the presence of, Dr. Mary GARCIA.   Electronically signed by: Ann Marie Rivera (Scribe), 5/5/2017  IDr. Mary MD personally performed the services described in this documentation, as scribed by Ann Marie Rivera in my presence, and it is both accurate and complete.

## 2017-05-05 NOTE — PROGRESS NOTES
Hospital Medicine Progress Note, Adult, Complex               Author: Devnorma Cardenas Date & Time created: 5/5/2017  1:51 PM     Interval History:  Patient seen and examined today. ICU Care  Care and plan discussed in IDT/Hot rounds.  Lines and assistive devices reviewed.    Patient tolerating treatment and therapies.  All Data, Medication data reviewed.  Case discussed with nursing as available.  Plan of Care reviewed with patient and notified of changes.  60 y/o with sepsis, abdominal pain, enteritis and renal failure, had some shrimp and rice prior to his abd. Episode, no other causative event reported    5/4 Pt feels better, still distended abdomen, passed some gas, no diarrhea, foot ulcer on R less concerning  5/5 Pt feels ok, new AFIB from last night, was given amio with conversion to SR, has had shaking, tremors for weeks , no h/o AFIB, or CAD  Tolerated BBL  Review of Systems:  Review of Systems   Constitutional: Positive for malaise/fatigue. Negative for fever and chills.   HENT: Negative.    Eyes: Negative.    Respiratory: Negative.  Negative for cough.    Cardiovascular: Negative.  Negative for chest pain.   Gastrointestinal: Positive for abdominal pain. Negative for heartburn and diarrhea.   Genitourinary: Negative.  Negative for dysuria.   Musculoskeletal: Negative.    Skin: Negative.  Negative for rash.   Neurological: Positive for weakness. Negative for headaches.   Endo/Heme/Allergies: Negative.    Psychiatric/Behavioral: Negative.    All other systems reviewed and are negative.      Physical Exam:  Physical Exam   Constitutional: He is oriented to person, place, and time. He appears well-developed and well-nourished.   HENT:   Head: Normocephalic.   Eyes: Pupils are equal, round, and reactive to light.   Neck: Normal range of motion.   Cardiovascular: Normal rate, regular rhythm and normal heart sounds.    Pulmonary/Chest: Effort normal.   Abdominal: Soft. Bowel sounds are decreased. There is  generalized tenderness.   Genitourinary: Rectum normal and penis normal.   Musculoskeletal: Normal range of motion.   Neurological: He is alert and oriented to person, place, and time.   Skin: Skin is warm.   Psychiatric: He has a normal mood and affect.   Nursing note and vitals reviewed.      Labs:        Invalid input(s): [id number]  Recent Labs      17   TROPONINI  <0.01   BNPBTYPENAT  16     Recent Labs      17   0330   SODIUM  132*  134*  135   POTASSIUM  7.6*  5.8*  4.6   CHLORIDE  104  107  105   CO2  23  22  25   BUN  34*  33*  11   CREATININE  2.38*  1.98*  1.00   MAGNESIUM   --   1.8  1.6   PHOSPHORUS   --   3.9  2.8   CALCIUM  7.9*  7.7*  8.6     Recent Labs      17   0330   ALTSGPT  5  5   --   5   ASTSGOT  10*  10*   --   8*   ALKPHOSPHAT  66  69   --   53   TBILIRUBIN  0.5  0.5   --   0.4   LIPASE  49   --    --    --    GLUCOSE  117*  139*  157*  124*     Recent Labs      17   0330   RBC  4.98  4.89  4.72  4.21*   HEMOGLOBIN  14.2  13.9*  13.3*  11.9*   HEMATOCRIT  44.1  44.1  43.2  38.5*   PLATELETCT  314  323  317  252   PROTHROMBTM  13.3   --    --    --    INR  0.98   --    --    --      Recent Labs      17   0540  17   0330   WBC  24.4*  24.6*  23.1*  12.8*   NEUTSPOLYS  86.70*  85.80*   --   70.70   LYMPHOCYTES  7.60*  9.30*   --   17.20*   MONOCYTES  3.70  3.80   --   5.10   EOSINOPHILS  0.80  0.30   --   6.00   BASOPHILS  0.50  0.20   --   0.50   ASTSGOT  10*  10*   --   8*   ALTSGPT  5  5   --   5   ALKPHOSPHAT  66  69   --   53   TBILIRUBIN  0.5  0.5   --   0.4           Hemodynamics:  Temp (24hrs), Av.1 °C (98.8 °F), Min:36.6 °C (97.8 °F), Max:38.6 °C (101.4 °F)  Temperature: 36.7 °C (98 °F)  Pulse  Av  Min: 51  Max: 129Heart Rate (Monitored): (!) 53  NIBP: (!) 96/51  mmHg  CVP (mm Hg): (!) 16 MM HG  Respiratory:    Respiration: 14, Pulse Oximetry: 96 %        RUL Breath Sounds: Clear;Diminished, RML Breath Sounds: Diminished, RLL Breath Sounds: Diminished, FERMIN Breath Sounds: Expiratory Wheezes, LLL Breath Sounds: Diminished  Fluids:    Intake/Output Summary (Last 24 hours) at 05/05/17 1351  Last data filed at 05/05/17 1200   Gross per 24 hour   Intake 4852.12 ml   Output   4975 ml   Net -122.88 ml        GI/Nutrition:  Orders Placed This Encounter   Procedures   • DIET ORDER     Standing Status: Standing      Number of Occurrences: 1      Standing Expiration Date:      Order Specific Question:  Diet:     Answer:  Full Liquid [11]     Medical Decision Making, by Problem:  Active Hospital Problems    Diagnosis   • *Syncope [R55] likely hypotension from sepsis, dehydration  Vs arrhythmia as Pt had documented rapid afib here    New atrial Fibrillation, converted on Amio, c/w BBL  Cards eval  Get trop, tft's, just had echo in 2/17, d-dimer  Full anticoagulation for now   • Right foot ulcer (CMS-HCC) [L97.519] wound care, not a true infective concern   • Hyperkalemia [E87.5] improved, c/w control   • Leukocytosis [D72.829] follow, better   • Lactic acidosis [E87.2] improved   • Enteritis, ? From food intake vs other , improved   • Sepsis (CMS-HCC) [A41.9] on protocol, off pressors   • Volume depletion [E86.9] recovering   • COPD (chronic obstructive pulmonary disease) (CMS-HCC) [J44.9] h/o   • DM type 2 (diabetes mellitus, type 2) (CMS-HCC) [E11.9] h/o   • Hypotension (arterial) [I95.9] improved   • Acute kidney injury (nontraumatic) (CMS-HCC) [N17.9] improving   -muscle cramping and tremors on/off.. ? Replace Mag  Plan  C/w emp. abx for 7 days  Fluids to reduce  Replace Magnesium  Monitor fluids and urine o/p  Wound care  See orders  Pt is critically ill in ICU, but stable for transfer to [facility]  Time spent 36 min, no overlap, d/w consultants    Medications reviewed, Labs reviewed and  Radiology images reviewed  Yañez catheter: Strict Intake and Output During Sepisis or Shock  Central line in place: Sepsis    DVT Prophylaxis: Heparin      Antibiotics: Treating active infection/contamination beyond 24 hours perioperative coverage  Assessed for rehab: Patient unable to tolerate rehabilitation therapeutic regimen

## 2017-05-05 NOTE — CARE PLAN
Problem: Venous Thromboembolism (VTW)/Deep Vein Thrombosis (DVT) Prevention:  Goal: Patient will participate in Venous Thrombosis (VTE)/Deep Vein Thrombosis (DVT)Prevention Measures  Intervention: Ensure patient wears graduated elastic stockings (NAYA hose) and/or SCDs, if ordered, when in bed or chair (Remove at least once per shift for skin check)  SCD's correctly in place. Skin check performed beginning of shift.       Problem: Pain Management  Goal: Pain level will decrease to patient’s comfort goal  Intervention: Educate and implement non-pharmacologic comfort measures. Examples: relaxation, distration, play therapy, activity therapy, massage, etc.  Assessed patients pain level, implemented non-pharmacological pain interventions to relieve pain. Reassessed pain level. Patient denies discomfort and refuses pharmacological interventions.

## 2017-05-05 NOTE — PROGRESS NOTES
Hospital Medicine Progress Note, Adult, Complex               Author: Dev Cardenas Date & Time created: 5/4/2017  5:36 PM     Interval History:  Patient seen and examined today. ICU Care  Care and plan discussed in IDT/Hot rounds.  Lines and assistive devices reviewed.    Patient tolerating treatment and therapies.  All Data, Medication data reviewed.  Case discussed with nursing as available.  Plan of Care reviewed with patient and notified of changes.  62 y/o with sepsis, abdominal pain, enteritis and renal failure, had some shrimp and rice prior to his abd. Episode, no other causative event reported    5/4 Pt feels better, still distended abdomen, passed some gas, no diarrhea, foot ulcer on R less concerning    Review of Systems:  Review of Systems   Constitutional: Positive for malaise/fatigue. Negative for fever and chills.   HENT: Negative.    Eyes: Negative.    Respiratory: Negative.  Negative for cough.    Cardiovascular: Negative.  Negative for chest pain.   Gastrointestinal: Positive for abdominal pain. Negative for heartburn and diarrhea.   Genitourinary: Negative.  Negative for dysuria.   Musculoskeletal: Negative.    Skin: Negative.  Negative for rash.   Neurological: Positive for weakness. Negative for headaches.   Endo/Heme/Allergies: Negative.    Psychiatric/Behavioral: Negative.    All other systems reviewed and are negative.      Physical Exam:  Physical Exam   Constitutional: He is oriented to person, place, and time. He appears well-developed and well-nourished.   HENT:   Head: Normocephalic.   Eyes: Pupils are equal, round, and reactive to light.   Neck: Normal range of motion.   Cardiovascular: Normal rate, regular rhythm and normal heart sounds.    Pulmonary/Chest: Effort normal.   Abdominal: Soft. Bowel sounds are decreased. There is generalized tenderness.   Genitourinary: Rectum normal and penis normal.   Musculoskeletal: Normal range of motion.   Neurological: He is alert and  oriented to person, place, and time.   Skin: Skin is warm.   Psychiatric: He has a normal mood and affect.   Nursing note and vitals reviewed.      Labs:        Invalid input(s): [id number]  Recent Labs      17   TROPONINI  <0.01   BNPBTYPENAT  16     Recent Labs      17   0305   SODIUM  135  132*  134*   POTASSIUM  6.3*  7.6*  5.8*   CHLORIDE  104  104  107   CO2  22  23  22   BUN  33*  34*  33*   CREATININE  2.66*  2.38*  1.98*   MAGNESIUM   --    --   1.8   PHOSPHORUS   --    --   3.9   CALCIUM  8.6  7.9*  7.7*     Recent Labs      17   0305   ALTSGPT  5  5   --    ASTSGOT  10*  10*   --    ALKPHOSPHAT  66  69   --    TBILIRUBIN  0.5  0.5   --    LIPASE  49   --    --    GLUCOSE  117*  139*  157*     Recent Labs      17   0540   RBC  4.98  4.89  4.72   HEMOGLOBIN  14.2  13.9*  13.3*   HEMATOCRIT  44.1  44.1  43.2   PLATELETCT  314  323  317   PROTHROMBTM  13.3   --    --    INR  0.98   --    --      Recent Labs      17   0540   WBC  24.4*  24.6*  23.1*   NEUTSPOLYS  86.70*  85.80*   --    LYMPHOCYTES  7.60*  9.30*   --    MONOCYTES  3.70  3.80   --    EOSINOPHILS  0.80  0.30   --    BASOPHILS  0.50  0.20   --    ASTSGOT  10*  10*   --    ALTSGPT  5  5   --    ALKPHOSPHAT  66  69   --    TBILIRUBIN  0.5  0.5   --            Hemodynamics:  Temp (24hrs), Av.6 °C (97.9 °F), Min:36.3 °C (97.4 °F), Max:37.2 °C (98.9 °F)  Temperature: 36.6 °C (97.8 °F)  Pulse  Av  Min: 51  Max: 105Heart Rate (Monitored): 83  Blood Pressure: (!) 72/49 mmHg, NIBP: 120/58 mmHg  CVP (mm Hg): (!) 7 MM HG  Respiratory:    Respiration: 17, Pulse Oximetry: 94 %, O2 Daily Delivery Respiratory : Silicone Nasal Cannula     Given By:: Mouthpiece, Work Of Breathing / Effort: Abdominal Wiggle;Accessory Muscle Use;Increased Work of Breathing;Moderate;Tachypnea  RUL  Breath Sounds: Diminished, RML Breath Sounds: Diminished, RLL Breath Sounds: Diminished, FERMIN Breath Sounds: Diminished, LLL Breath Sounds: Diminished  Fluids:    Intake/Output Summary (Last 24 hours) at 05/04/17 1736  Last data filed at 05/04/17 1600   Gross per 24 hour   Intake 7119.22 ml   Output   1000 ml   Net 6119.22 ml     Weight: 95.8 kg (211 lb 3.2 oz)  GI/Nutrition:  Orders Placed This Encounter   Procedures   • DIET ORDER     Standing Status: Standing      Number of Occurrences: 1      Standing Expiration Date:      Order Specific Question:  Diet:     Answer:  Full Liquid [11]     Medical Decision Making, by Problem:  Active Hospital Problems    Diagnosis   • *Syncope [R55] likely hypotension from sepsis, dehydration   • Right foot ulcer (CMS-HCC) [L97.519] wound care, no a true infective concern   • Hyperkalemia [E87.5] improved   • Leukocytosis [D72.829] follow   • Lactic acidosis [E87.2] imporved   • Enteritis, ? From food intake vs other   • Sepsis (CMS-HCC) [A41.9] on protocol, off pressors   • Volume depletion [E86.9] recovering   • COPD (chronic obstructive pulmonary disease) (CMS-HCC) [J44.9] h/o   • DM type 2 (diabetes mellitus, type 2) (CMS-HCC) [E11.9] h/o   • Hypotension (arterial) [I95.9] improved   • Acute kidney injury (nontraumatic) (CMS-HCC) [N17.9] improving   Plan  C/w emp. abx  Fluids  Monitor fluids and urine o/p  Wound care  F/u abd. Xray in am  See orders  Pt is critically ill in ICU  Time spent 35 min, no overlap    Medications reviewed, Labs reviewed and Radiology images reviewed  Yañez catheter: Strict Intake and Output During Sepisis or Shock  Central line in place: Sepsis    DVT Prophylaxis: Heparin      Antibiotics: Treating active infection/contamination beyond 24 hours perioperative coverage  Assessed for rehab: Patient unable to tolerate rehabilitation therapeutic regimen

## 2017-05-05 NOTE — CARE PLAN
Problem: Skin Integrity  Goal: Risk for impaired skin integrity will decrease  Head-to-toe skin assessment completed. Q2 hour turns in progress. Pillows in place for support and positioning. No new s/sx of skin breakdown.     Problem: Psychosocial Needs:  Goal: Level of anxiety will decrease  Pt appears anxious at times and calls for assistance with simple tasks that he is able to manage. Emotional support provided. Pt educated on importance of maintaining independence while in the hospital. Pt verbalizes and demonstrates understanding.

## 2017-05-05 NOTE — PROGRESS NOTES
Patients wife at bedside during medication administration. Interaction between wife and patient was pleasant. Educated patient and wife on medications. Explained plan of care. Provided reassurance.

## 2017-05-06 LAB
ALBUMIN SERPL BCP-MCNC: 3.3 G/DL (ref 3.2–4.9)
ALBUMIN/GLOB SERPL: 1.2 G/DL
ALP SERPL-CCNC: 51 U/L (ref 30–99)
ALT SERPL-CCNC: <5 U/L (ref 2–50)
ANION GAP SERPL CALC-SCNC: 3 MMOL/L (ref 0–11.9)
AST SERPL-CCNC: 7 U/L (ref 12–45)
BASOPHILS # BLD AUTO: 0.5 % (ref 0–1.8)
BASOPHILS # BLD: 0.07 K/UL (ref 0–0.12)
BILIRUB SERPL-MCNC: 0.3 MG/DL (ref 0.1–1.5)
BUN SERPL-MCNC: 11 MG/DL (ref 8–22)
CALCIUM SERPL-MCNC: 9 MG/DL (ref 8.5–10.5)
CHLORIDE SERPL-SCNC: 106 MMOL/L (ref 96–112)
CO2 SERPL-SCNC: 30 MMOL/L (ref 20–33)
CREAT SERPL-MCNC: 1.06 MG/DL (ref 0.5–1.4)
EOSINOPHIL # BLD AUTO: 0.86 K/UL (ref 0–0.51)
EOSINOPHIL NFR BLD: 6.7 % (ref 0–6.9)
ERYTHROCYTE [DISTWIDTH] IN BLOOD BY AUTOMATED COUNT: 40.3 FL (ref 35.9–50)
GFR SERPL CREATININE-BSD FRML MDRD: >60 ML/MIN/1.73 M 2
GLOBULIN SER CALC-MCNC: 2.8 G/DL (ref 1.9–3.5)
GLUCOSE BLD-MCNC: 130 MG/DL (ref 65–99)
GLUCOSE BLD-MCNC: 135 MG/DL (ref 65–99)
GLUCOSE BLD-MCNC: 168 MG/DL (ref 65–99)
GLUCOSE BLD-MCNC: 218 MG/DL (ref 65–99)
GLUCOSE SERPL-MCNC: 120 MG/DL (ref 65–99)
HCT VFR BLD AUTO: 36.3 % (ref 42–52)
HGB BLD-MCNC: 11.3 G/DL (ref 14–18)
IMM GRANULOCYTES # BLD AUTO: 0.06 K/UL (ref 0–0.11)
IMM GRANULOCYTES NFR BLD AUTO: 0.5 % (ref 0–0.9)
LYMPHOCYTES # BLD AUTO: 1.92 K/UL (ref 1–4.8)
LYMPHOCYTES NFR BLD: 15 % (ref 22–41)
MAGNESIUM SERPL-MCNC: 1.7 MG/DL (ref 1.5–2.5)
MCH RBC QN AUTO: 27.9 PG (ref 27–33)
MCHC RBC AUTO-ENTMCNC: 31.1 G/DL (ref 33.7–35.3)
MCV RBC AUTO: 89.6 FL (ref 81.4–97.8)
MONOCYTES # BLD AUTO: 0.6 K/UL (ref 0–0.85)
MONOCYTES NFR BLD AUTO: 4.7 % (ref 0–13.4)
NEUTROPHILS # BLD AUTO: 9.25 K/UL (ref 1.82–7.42)
NEUTROPHILS NFR BLD: 72.6 % (ref 44–72)
NRBC # BLD AUTO: 0 K/UL
NRBC BLD AUTO-RTO: 0 /100 WBC
PHOSPHATE SERPL-MCNC: 2.6 MG/DL (ref 2.5–4.5)
PLATELET # BLD AUTO: 240 K/UL (ref 164–446)
PMV BLD AUTO: 10.6 FL (ref 9–12.9)
POTASSIUM SERPL-SCNC: 4.5 MMOL/L (ref 3.6–5.5)
PROT SERPL-MCNC: 6.1 G/DL (ref 6–8.2)
RBC # BLD AUTO: 4.05 M/UL (ref 4.7–6.1)
SODIUM SERPL-SCNC: 139 MMOL/L (ref 135–145)
WBC # BLD AUTO: 12.8 K/UL (ref 4.8–10.8)

## 2017-05-06 PROCEDURE — 99233 SBSQ HOSP IP/OBS HIGH 50: CPT | Performed by: HOSPITALIST

## 2017-05-06 PROCEDURE — 770020 HCHG ROOM/CARE - TELE (206)

## 2017-05-06 PROCEDURE — 700105 HCHG RX REV CODE 258: Performed by: HOSPITALIST

## 2017-05-06 PROCEDURE — A9270 NON-COVERED ITEM OR SERVICE: HCPCS | Performed by: HOSPITALIST

## 2017-05-06 PROCEDURE — 84100 ASSAY OF PHOSPHORUS: CPT

## 2017-05-06 PROCEDURE — A9270 NON-COVERED ITEM OR SERVICE: HCPCS | Performed by: INTERNAL MEDICINE

## 2017-05-06 PROCEDURE — 700111 HCHG RX REV CODE 636 W/ 250 OVERRIDE (IP): Performed by: HOSPITALIST

## 2017-05-06 PROCEDURE — 93970 EXTREMITY STUDY: CPT | Mod: 26 | Performed by: SURGERY

## 2017-05-06 PROCEDURE — 36415 COLL VENOUS BLD VENIPUNCTURE: CPT

## 2017-05-06 PROCEDURE — 93010 ELECTROCARDIOGRAM REPORT: CPT | Performed by: INTERNAL MEDICINE

## 2017-05-06 PROCEDURE — 82962 GLUCOSE BLOOD TEST: CPT | Mod: 91

## 2017-05-06 PROCEDURE — 700102 HCHG RX REV CODE 250 W/ 637 OVERRIDE(OP): Performed by: INTERNAL MEDICINE

## 2017-05-06 PROCEDURE — 93005 ELECTROCARDIOGRAM TRACING: CPT | Performed by: INTERNAL MEDICINE

## 2017-05-06 PROCEDURE — 85025 COMPLETE CBC W/AUTO DIFF WBC: CPT

## 2017-05-06 PROCEDURE — 700111 HCHG RX REV CODE 636 W/ 250 OVERRIDE (IP): Performed by: INTERNAL MEDICINE

## 2017-05-06 PROCEDURE — 700102 HCHG RX REV CODE 250 W/ 637 OVERRIDE(OP): Performed by: HOSPITALIST

## 2017-05-06 PROCEDURE — 700105 HCHG RX REV CODE 258: Performed by: INTERNAL MEDICINE

## 2017-05-06 PROCEDURE — 80053 COMPREHEN METABOLIC PANEL: CPT

## 2017-05-06 PROCEDURE — 83735 ASSAY OF MAGNESIUM: CPT

## 2017-05-06 PROCEDURE — 93970 EXTREMITY STUDY: CPT

## 2017-05-06 RX ORDER — DEXTROSE MONOHYDRATE 50 MG/ML
500 INJECTION, SOLUTION INTRAVENOUS CONTINUOUS
Status: DISCONTINUED | OUTPATIENT
Start: 2017-05-06 | End: 2017-05-07

## 2017-05-06 RX ORDER — AMIODARONE HYDROCHLORIDE 200 MG/1
400 TABLET ORAL TWICE DAILY
Status: DISCONTINUED | OUTPATIENT
Start: 2017-05-06 | End: 2017-05-08 | Stop reason: HOSPADM

## 2017-05-06 RX ADMIN — AMIODARONE HYDROCHLORIDE 0.51 MG/MIN: 50 INJECTION, SOLUTION INTRAVENOUS at 18:35

## 2017-05-06 RX ADMIN — PIPERACILLIN SODIUM AND TAZOBACTAM SODIUM 3.38 G: 3; .375 INJECTION, POWDER, FOR SOLUTION INTRAVENOUS at 02:47

## 2017-05-06 RX ADMIN — ALBUTEROL SULFATE 2 PUFF: 90 AEROSOL, METERED RESPIRATORY (INHALATION) at 21:51

## 2017-05-06 RX ADMIN — METOPROLOL TARTRATE 25 MG: 25 TABLET, FILM COATED ORAL at 21:09

## 2017-05-06 RX ADMIN — INSULIN LISPRO 1 UNITS: 100 INJECTION, SOLUTION INTRAVENOUS; SUBCUTANEOUS at 16:33

## 2017-05-06 RX ADMIN — GABAPENTIN 400 MG: 400 CAPSULE ORAL at 08:07

## 2017-05-06 RX ADMIN — SODIUM CHLORIDE: 9 INJECTION, SOLUTION INTRAVENOUS at 21:38

## 2017-05-06 RX ADMIN — INSULIN LISPRO 2 UNITS: 100 INJECTION, SOLUTION INTRAVENOUS; SUBCUTANEOUS at 11:20

## 2017-05-06 RX ADMIN — STANDARDIZED SENNA CONCENTRATE AND DOCUSATE SODIUM 2 TABLET: 8.6; 5 TABLET, FILM COATED ORAL at 21:09

## 2017-05-06 RX ADMIN — PIPERACILLIN SODIUM AND TAZOBACTAM SODIUM 3.38 G: 3; .375 INJECTION, POWDER, FOR SOLUTION INTRAVENOUS at 14:22

## 2017-05-06 RX ADMIN — METOPROLOL TARTRATE 25 MG: 25 TABLET, FILM COATED ORAL at 08:06

## 2017-05-06 RX ADMIN — GABAPENTIN 400 MG: 400 CAPSULE ORAL at 21:09

## 2017-05-06 RX ADMIN — AMIODARONE HYDROCHLORIDE 400 MG: 200 TABLET ORAL at 12:25

## 2017-05-06 RX ADMIN — STANDARDIZED SENNA CONCENTRATE AND DOCUSATE SODIUM 2 TABLET: 8.6; 5 TABLET, FILM COATED ORAL at 08:07

## 2017-05-06 RX ADMIN — GABAPENTIN 400 MG: 400 CAPSULE ORAL at 14:22

## 2017-05-06 RX ADMIN — SODIUM CHLORIDE: 9 INJECTION, SOLUTION INTRAVENOUS at 08:06

## 2017-05-06 RX ADMIN — PIPERACILLIN SODIUM AND TAZOBACTAM SODIUM 3.38 G: 3; .375 INJECTION, POWDER, FOR SOLUTION INTRAVENOUS at 08:06

## 2017-05-06 RX ADMIN — LOVASTATIN 40 MG: 20 TABLET ORAL at 21:09

## 2017-05-06 RX ADMIN — AMIODARONE HYDROCHLORIDE 0.99 MG/MIN: 50 INJECTION, SOLUTION INTRAVENOUS at 08:08

## 2017-05-06 RX ADMIN — DEXTROSE MONOHYDRATE 500 ML: 50 INJECTION, SOLUTION INTRAVENOUS at 07:42

## 2017-05-06 RX ADMIN — AMIODARONE HYDROCHLORIDE 400 MG: 200 TABLET ORAL at 21:09

## 2017-05-06 RX ADMIN — TIOTROPIUM BROMIDE 1 CAPSULE: 18 CAPSULE ORAL; RESPIRATORY (INHALATION) at 08:07

## 2017-05-06 RX ADMIN — RISPERIDONE 3 MG: 0.5 TABLET, FILM COATED ORAL at 21:37

## 2017-05-06 RX ADMIN — ENOXAPARIN SODIUM 100 MG: 100 INJECTION SUBCUTANEOUS at 21:37

## 2017-05-06 RX ADMIN — PIPERACILLIN SODIUM AND TAZOBACTAM SODIUM 3.38 G: 3; .375 INJECTION, POWDER, FOR SOLUTION INTRAVENOUS at 21:18

## 2017-05-06 RX ADMIN — AMIODARONE HYDROCHLORIDE 150 MG: 50 INJECTION, SOLUTION INTRAVENOUS at 07:42

## 2017-05-06 RX ADMIN — CITALOPRAM HYDROBROMIDE 10 MG: 20 TABLET ORAL at 08:07

## 2017-05-06 RX ADMIN — ENOXAPARIN SODIUM 100 MG: 100 INJECTION SUBCUTANEOUS at 09:18

## 2017-05-06 ASSESSMENT — ENCOUNTER SYMPTOMS
ABDOMINAL PAIN: 1
VOMITING: 0
DIARRHEA: 0
DIZZINESS: 0
MUSCULOSKELETAL NEGATIVE: 1
PND: 0
SPEECH CHANGE: 0
MEMORY LOSS: 0
CLAUDICATION: 0
SORE THROAT: 0
BRUISES/BLEEDS EASILY: 0
EYE PAIN: 0
FEVER: 0
STRIDOR: 0
CARDIOVASCULAR NEGATIVE: 1
NAUSEA: 0
RESPIRATORY NEGATIVE: 1
SPUTUM PRODUCTION: 0
EYES NEGATIVE: 1
NECK PAIN: 0
CONSTIPATION: 0
COUGH: 0
HEMOPTYSIS: 0
PSYCHIATRIC NEGATIVE: 1
BLOOD IN STOOL: 0
NERVOUS/ANXIOUS: 0
SHORTNESS OF BREATH: 0
PHOTOPHOBIA: 0
TREMORS: 0
CHILLS: 0
PALPITATIONS: 0
BACK PAIN: 0
TINGLING: 0
MYALGIAS: 0
ORTHOPNEA: 0
DEPRESSION: 0
WEAKNESS: 1
BLURRED VISION: 0
HEADACHES: 0
HEARTBURN: 0
DOUBLE VISION: 0
SENSORY CHANGE: 0

## 2017-05-06 ASSESSMENT — LIFESTYLE VARIABLES
ALCOHOL_USE: NO
EVER_SMOKED: YES

## 2017-05-06 ASSESSMENT — PATIENT HEALTH QUESTIONNAIRE - PHQ9
SUM OF ALL RESPONSES TO PHQ QUESTIONS 1-9: 1
1. LITTLE INTEREST OR PLEASURE IN DOING THINGS: NOT AT ALL
2. FEELING DOWN, DEPRESSED, IRRITABLE, OR HOPELESS: SEVERAL DAYS
SUM OF ALL RESPONSES TO PHQ9 QUESTIONS 1 AND 2: 1

## 2017-05-06 ASSESSMENT — PAIN SCALES - GENERAL
PAINLEVEL_OUTOF10: 0
PAINLEVEL_OUTOF10: 0
PAINLEVEL_OUTOF10: 2

## 2017-05-06 NOTE — CARE PLAN
Problem: Safety  Goal: Will remain free from injury  Call light within reach, treaded socks in place, bed in lowest position and locked.  Hourly rounding in progress.     Problem: Pain Management  Goal: Pain level will decrease to patient’s comfort goal  Pt denies pain at this time.

## 2017-05-06 NOTE — RESPIRATORY CARE
COPD EDUCATION by COPD CLINICAL EDUCATOR  5/6/2017 at 6:16 AM by Julia Jones     Patient reviewed by COPD education team. Patient does not qualify for COPD program.

## 2017-05-06 NOTE — PROGRESS NOTES
Hospital Medicine Progress Note, Adult, Complex               Author: Nathalie Licona Date & Time created: 5/6/2017  9:18 AM     Interval History:    61 y.o male with PMHx of Type II DM, Asthma, HTN, admitted for snycope, found to be hypotensive, requiring pressor support. In addition Paroxysmal atrial fibrillation, which chemical cardioversion was performed.     Patient went back into atrial fibrillation. Patient was placed back on amiodarone gtt, anticoagulation started with lovenox weight based   Sepsis resolved, continuing zosyn for broad coverage.     Review of Systems:  Review of Systems   Constitutional: Positive for malaise/fatigue. Negative for fever and chills.   HENT: Negative.  Negative for congestion, hearing loss, sore throat and tinnitus.    Eyes: Negative.  Negative for blurred vision, double vision, photophobia and pain.   Respiratory: Negative.  Negative for cough, hemoptysis, sputum production, shortness of breath and stridor.    Cardiovascular: Negative.  Negative for chest pain, palpitations, orthopnea, claudication and PND.   Gastrointestinal: Positive for abdominal pain (minmal). Negative for heartburn, nausea, vomiting, diarrhea, constipation, blood in stool and melena.   Genitourinary: Negative.  Negative for dysuria, urgency and frequency.   Musculoskeletal: Negative.  Negative for myalgias, back pain and neck pain.   Skin: Negative.  Negative for rash.   Neurological: Positive for weakness. Negative for dizziness, tingling, tremors, sensory change, speech change and headaches.   Endo/Heme/Allergies: Negative.    Psychiatric/Behavioral: Negative.  Negative for depression, suicidal ideas and memory loss. The patient is not nervous/anxious.    All other systems reviewed and are negative.      Physical Exam:  Physical Exam   Constitutional: He is oriented to person, place, and time. He appears well-developed and well-nourished.   HENT:   Head: Normocephalic and atraumatic.   Mouth/Throat: No  oropharyngeal exudate.   Eyes: Conjunctivae are normal. Pupils are equal, round, and reactive to light. Right eye exhibits no discharge. No scleral icterus.   Neck: Normal range of motion. Neck supple. No JVD present. No thyromegaly present.   Cardiovascular: Normal rate, regular rhythm, normal heart sounds and intact distal pulses.    No murmur heard.  Pulmonary/Chest: Effort normal and breath sounds normal. No stridor. No respiratory distress. He has no wheezes. He has no rales.   Abdominal: Soft. He exhibits no distension. Bowel sounds are decreased. There is no rebound and no guarding.   Genitourinary: Rectum normal and penis normal.   Musculoskeletal: Normal range of motion. He exhibits no edema.   Neurological: He is alert and oriented to person, place, and time.   Skin: Skin is warm. No erythema.   Psychiatric: He has a normal mood and affect. His behavior is normal. Thought content normal.   Nursing note and vitals reviewed.      Labs:        Invalid input(s): [id number]  Recent Labs      05/03/17 1818 05/05/17   1420   TROPONINI  <0.01  <0.01   BNPBTYPENAT  16   --      Recent Labs      05/04/17   0305  05/05/17   0330  05/06/17   0522   SODIUM  134*  135  139   POTASSIUM  5.8*  4.6  4.5   CHLORIDE  107  105  106   CO2  22  25  30   BUN  33*  11  11   CREATININE  1.98*  1.00  1.06   MAGNESIUM  1.8  1.6  1.7   PHOSPHORUS  3.9  2.8  2.6   CALCIUM  7.7*  8.6  9.0     Recent Labs      05/03/17 1818 05/03/17   2317  05/04/17   0305  05/05/17   0330  05/06/17   0522   ALTSGPT  5  5   --   5  <5   ASTSGOT  10*  10*   --   8*  7*   ALKPHOSPHAT  66  69   --   53  51   TBILIRUBIN  0.5  0.5   --   0.4  0.3   LIPASE  49   --    --    --    --    GLUCOSE  117*  139*  157*  124*  120*     Recent Labs      05/03/17 1818 05/04/17   0540  05/05/17   0330  05/06/17   0522   RBC  4.98   < >  4.72  4.21*  4.05*   HEMOGLOBIN  14.2   < >  13.3*  11.9*  11.3*   HEMATOCRIT  44.1   < >  43.2  38.5*  36.3*   PLATELETCT   314   < >  317  252  240   PROTHROMBTM  13.3   --    --    --    --    INR  0.98   --    --    --    --     < > = values in this interval not displayed.     Recent Labs      17   2317  17   0540  17   0330  17   0522   WBC  24.6*  23.1*  12.8*  12.8*   NEUTSPOLYS  85.80*   --   70.70  72.60*   LYMPHOCYTES  9.30*   --   17.20*  15.00*   MONOCYTES  3.80   --   5.10  4.70   EOSINOPHILS  0.30   --   6.00  6.70   BASOPHILS  0.20   --   0.50  0.50   ASTSGOT  10*   --   8*  7*   ALTSGPT  5   --   5  <5   ALKPHOSPHAT  69   --   53  51   TBILIRUBIN  0.5   --   0.4  0.3           Hemodynamics:  Temp (24hrs), Av.7 °C (98 °F), Min:36.2 °C (97.2 °F), Max:37.3 °C (99.2 °F)  Temperature: 36.2 °C (97.2 °F)  Pulse  Av.4  Min: 51  Max: 129Heart Rate (Monitored): (!) 51  Blood Pressure: 119/66 mmHg, NIBP: 127/58 mmHg  CVP (mm Hg): (!) 27 MM HG  Respiratory:    Respiration: 18, Pulse Oximetry: 94 %        RUL Breath Sounds: Diminished, RML Breath Sounds: Diminished, RLL Breath Sounds: Diminished, FERMIN Breath Sounds: Diminished, LLL Breath Sounds: Diminished  Fluids:    Intake/Output Summary (Last 24 hours) at 17 0918  Last data filed at 17 1800   Gross per 24 hour   Intake   1262 ml   Output   1475 ml   Net   -213 ml     Weight: 96.7 kg (213 lb 3 oz)  GI/Nutrition:  Orders Placed This Encounter   Procedures   • DIET ORDER     Standing Status: Standing      Number of Occurrences: 1      Standing Expiration Date:      Order Specific Question:  Diet:     Answer:  Diabetic [3]     Order Specific Question:  Texture/Fiber modifications:     Answer:  Dysphagia 3(Mechanical Soft)specify fluid consistency(question 6) [3]     Order Specific Question:  Miscellaneous modifications:     Answer:  No Caffeine [12]     Medical Decision Making, by Problem:  Active Hospital Problems    Diagnosis   • *Syncope [R55]   - Most likely secondary from sepsis causing dehydration  - symptoms resolved     • Right foot  ulcer (CMS-HCC) [L97.519]   - continue wound care, does not appear to be infected.     • Hyperkalemia [E87.5]   - resolved     • Leukocytosis [D72.829]  - slowly improving, check daily cbc .     • Lactic acidosis [E87.2]  - resolved     • Gastroenteritis  - his symptoms 2/2 to viral? Unsure, hwoever will contine with IV Zosyn.     • Sepsis (CMS-HCC) [A41.9]   - off pressors, continue antibiotics.     • Volume depletion [E86.9]      • COPD (chronic obstructive pulmonary disease) (CMS-HCC) [J44.9]   - Continue RT protocol, duo nebs, Pep therapy if warranted, and incentive spirometry.      • DM type 2 (diabetes mellitus, type 2) (CMS-HCC) [E11.9]   - Continue Insulin-sliding scale, accu-checks and hypoglycemia protocol.       • Hypotension (arterial) [I95.9]   - resolved     • Acute kidney injury (nontraumatic) (CMS-HCC) [N17.9]  - resolved.     Atrial fibrillation with rapid rate.  - Continue amiodarone gtt, was taken off last night but went from NSR to atrial fibrillation again  - continue lovenox cont metoprolol.  - cardiology following.    Patient plan of care discussed at multidisplinary team rounds and with patient and R.N at beside.    The total time spent face to face with this patient was about 40 mins of which 60% of time was spent on counseling, review of records including pertinent lab data and studies, as well as discussing diagnostic evaluation and work up, planned therapeutic interventions and future disposition of care. Where indicated, the assessment and plan reflect discussion of patient with consultants, other healthcare providers, family members, and additional research needed to obtain further information in formulating the plan of care of this patient.           Medications reviewed, Labs reviewed and Radiology images reviewed  Yañez catheter: Strict Intake and Output During Sepisis or Shock  Central line in place: Sepsis    DVT Prophylaxis: Heparin      Antibiotics: Treating active  infection/contamination beyond 24 hours perioperative coverage  Assessed for rehab: Patient unable to tolerate rehabilitation therapeutic regimen

## 2017-05-06 NOTE — PROGRESS NOTES
Patient up to bathroom with assistance - tele called  0538 - patient 150's SR, tele called back at 0550 after patient had been back in bed for 5 minutes and said he is sustaining A flutter - 100teens to 150.  Per charge, Forrest - order stat EKG

## 2017-05-06 NOTE — CONSULTS
DATE OF SERVICE:  05/05/2017    DATE OF CONSULTATION:  05/05/2017    REASON FOR CONSULTATION:  I was asked by Dr. Dev Cardenas of the   hospitalist staff to evaluate this patient with paroxysmal atrial fibrillation   in the setting of concern for severe infection and sepsis.    CHIEF COMPLAINT:  Altered mental status.    HISTORY OF PRESENT ILLNESS:  This is a 61-year-old gentleman with history of   COPD, diabetes, hyperlipidemia, anxiety who presents with abdominal pain   without associated features and loss of consciousness.  He presented with   concern to have sepsis of GI origin and started on appropriate protocols.    Overnight, his telemetry had atrial fibrillation with rapid ventricular   response and was given amiodarone with reversion back to sinus rhythm.  He had   previously been hospitalized in February of this year with dehydration and   breathing issues and was started on home oxygen.  His echocardiogram at that   time was normal including his rhythm.  He is feeling significantly better.  He   is on antibiotics and has not had recurrence of the atrial fibrillation.  He   had been started on anticoagulation given his elevated CHADS-VASc risk score   with recent paroxysmal atrial fibrillation.  On discussion with him, he does   enjoy dark green vegetables such as spinach and bok tabatha and actually it is on   a regular occasions who is concerned about his ability to take blindly   Coumadin and would likely prefer to new agent if they were affordable with his   health insurance program.    PAST MEDICAL HISTORY:  Hypertension, diabetes, asthma, hyperlipidemia.    PAST SURGICAL HISTORY:  Right knee surgery in 2010.    ALLERGIES:  No known drug allergies.    FAMILY HISTORY:  His father had reportedly prostate cancer.  He had told me it   was colon cancer, but no known history of heart disease.  He did have an   adopted or stepson and had cardiomyopathy in account to that, but was not with   relation to  the patient.    HOME MEDICATIONS:  Gabapentin, lovastatin, gemfibrozil, aspirin, Risperdal,   Spiriva, Celexa, glyburide, and metformin.    SOCIAL HISTORY:  Never smoked, although he reports his mother smoked a lot,   but she  when he was 13.  He has occasional alcohol intake, lives with his   wife who is .    REVIEW OF SYSTEMS:  Otherwise negative except as mentioned pertinent positives   and negatives in the HPI.    PHYSICAL EXAMINATION:  VITAL SIGNS:  Now afebrile, his heart rate now normal, blood pressure was   initially quite hypotensive and now has been normal, he is satting well on 2.5   liters supplemental oxygen.  His weight is on the bed scale up from 203   pounds to 213 pounds, which revealed a BMI of around 29.  CVP early was   elevated after being initially quite low.  He was initially on norepinephrine   drip and now that has been off, and he denies any pain.  HEENT:  His eyes are anicteric.  His mouth shows poor dentition, but moist.  NECK:  No bruits.  CHEST:  Clear to auscultation.  HEART:  Regular rate and rhythm.  S1, S2 without murmurs, rubs, or gallops.  ABDOMEN:  Soft, nontender.  EXTREMITIES:  Warm and well perfused, 2+ pulses throughout with no edema.  NEUROLOGIC:  Grossly nonfocal.  SKIN:  Grossly nonfocal.    DIAGNOSTIC DATA:  His EKG shows atrial fibrillation around 02:30 this morning.    EKG on presentation was sinus rhythm with borderline left axis deviation and   poor R-wave progression, which is similar to prior EKG from 2017 and 2016.    His echocardiography in February of this year shows preserved ejection   fraction and no significant valve disease.  A stress test from last fall shows   normal myocardial perfusion.    OVERALL IMPRESSION:  1.  Concern for sepsis, presumably from gastrointestinal source.  2.  Paroxysmal atrial fibrillation, now in sinus rhythm after chemical   cardioversion.  3.  Diabetes.  4.  Hypertension.    RECOMMENDATIONS:  It is my pleasure to  see the patient for evaluation of his   paroxysmal atrial fibrillation.  This has reverted to sinus rhythm with the   addition of amiodarone for chemical cardioversion at that time.  He has   maintained sinus rhythm and so given his risk factors it is important to   provide anticoagulation.  We did discuss long-term anticoagulation options.    He is likely to prefer the new oral anticoagulants and so healthcare   coordination checked in to see if these are affordable for him with his   prescription plan.  His prior echocardiogram and stress test within a year are   normal, so I do not think we have to repeat those.  He is on metoprolol,   which is reasonable.  We will make sure that he has followup on discharge and   follow up with final decision for the anticoagulant that would be an option   for him.    Thank you for the opportunity to participate in his care.  Please do not   hesitate to contact me with questions or concerns.       ____________________________________     MD EVARISTO Melissa / ALEX    DD:  05/05/2017 23:44:28  DT:  05/06/2017 02:48:08    D#:  [id number]  Job#:  [id number]

## 2017-05-06 NOTE — PROGRESS NOTES
Pt has arrived in room  -20 min.ago.  Tele applied, pt told to call RN/CNA before he attempts to get out of bed 1st time so we can assess if he is safe to ambulate on his own.

## 2017-05-06 NOTE — PROGRESS NOTES
Assumed pt care at 0715.  Received report from night RN.  Assessment completed.  Pt AAOx4.  Pt has no comlaints of pain at this time.  No other s/s of discomfort or distress.  Amiodarone drip started this AM. Pt ambulates with 1 assist.  Bed in lowest position, locked, and bed alarm is on.  Pt calls appropriately.  Treaded socks in place, call light within reach and staff numbers provided.  Pt needs met at this time.

## 2017-05-06 NOTE — PROGRESS NOTES
Patient transported via hospital bed on monitor with ALCS RN and CCT. Report given to Emilie VALDOVINOS. All questions answered. Patient transported with 1 blue patient belonging bag, chart and medications.

## 2017-05-06 NOTE — PROGRESS NOTES
Hospital Medicine Progress Note, Adult, Complex               Author: Loyd Hou Date & Time created: 5/6/2017  11:52 AM     Interval History:  Went back into afib this AM    Feels better    Review of Systems:  Review of Systems   Constitutional: Positive for malaise/fatigue. Negative for fever and chills.   Respiratory: Negative for shortness of breath.    Cardiovascular: Negative for chest pain and palpitations.   Gastrointestinal: Negative for nausea.   Neurological: Negative for dizziness.   Endo/Heme/Allergies: Does not bruise/bleed easily.       Physical Exam:  Physical Exam  NAD  Chest CTA  cv irreg irreg  abd s nt  Ext WWP    Labs:        Invalid input(s): [id number]  Recent Labs      05/03/17 1818 05/05/17   1420   TROPONINI  <0.01  <0.01   BNPBTYPENAT  16   --      Recent Labs      05/04/17 0305 05/05/17 0330 05/06/17   0522   SODIUM  134*  135  139   POTASSIUM  5.8*  4.6  4.5   CHLORIDE  107  105  106   CO2  22  25  30   BUN  33*  11  11   CREATININE  1.98*  1.00  1.06   MAGNESIUM  1.8  1.6  1.7   PHOSPHORUS  3.9  2.8  2.6   CALCIUM  7.7*  8.6  9.0     Recent Labs      05/03/17 1818 05/03/17   2317  05/04/17 0305  05/05/17 0330 05/06/17   0522   ALTSGPT  5  5   --   5  <5   ASTSGOT  10*  10*   --   8*  7*   ALKPHOSPHAT  66  69   --   53  51   TBILIRUBIN  0.5  0.5   --   0.4  0.3   LIPASE  49   --    --    --    --    GLUCOSE  117*  139*  157*  124*  120*     Recent Labs      05/03/17 1818 05/04/17   0540  05/05/17   0330  05/06/17   0522   RBC  4.98   < >  4.72  4.21*  4.05*   HEMOGLOBIN  14.2   < >  13.3*  11.9*  11.3*   HEMATOCRIT  44.1   < >  43.2  38.5*  36.3*   PLATELETCT  314   < >  317  252  240   PROTHROMBTM  13.3   --    --    --    --    INR  0.98   --    --    --    --     < > = values in this interval not displayed.     Recent Labs      05/03/17   2317  05/04/17   0540  05/05/17   0330  05/06/17   0522   WBC  24.6*  23.1*  12.8*  12.8*   NEUTSPOLYS  85.80*    --   70.70  72.60*   LYMPHOCYTES  9.30*   --   17.20*  15.00*   MONOCYTES  3.80   --   5.10  4.70   EOSINOPHILS  0.30   --   6.00  6.70   BASOPHILS  0.20   --   0.50  0.50   ASTSGOT  10*   --   8*  7*   ALTSGPT  5   --   5  <5   ALKPHOSPHAT  69   --   53  51   TBILIRUBIN  0.5   --   0.4  0.3           Hemodynamics:  Temp (24hrs), Av.6 °C (97.9 °F), Min:36.2 °C (97.2 °F), Max:37.3 °C (99.2 °F)  Temperature: 36.2 °C (97.2 °F)  Pulse  Av.3  Min: 51  Max: 129Heart Rate (Monitored): (!) 51  Blood Pressure: 104/60 mmHg, NIBP: 127/58 mmHg  CVP (mm Hg): (!) 27 MM HG  Respiratory:    Respiration: 18, Pulse Oximetry: 97 %        RUL Breath Sounds: Diminished, RML Breath Sounds: Diminished, RLL Breath Sounds: Diminished, FERMIN Breath Sounds: Diminished, LLL Breath Sounds: Diminished  Fluids:    Intake/Output Summary (Last 24 hours) at 17 1152  Last data filed at 17 1800   Gross per 24 hour   Intake    958 ml   Output   1225 ml   Net   -267 ml     Weight: 96.7 kg (213 lb 3 oz)  GI/Nutrition:  Orders Placed This Encounter   Procedures   • DIET ORDER     Standing Status: Standing      Number of Occurrences: 1      Standing Expiration Date:      Order Specific Question:  Diet:     Answer:  Diabetic [3]     Order Specific Question:  Texture/Fiber modifications:     Answer:  Dysphagia 3(Mechanical Soft)specify fluid consistency(question 6) [3]     Order Specific Question:  Miscellaneous modifications:     Answer:  No Caffeine [12]     Medical Decision Making, by Problem:  Active Hospital Problems    Diagnosis   • *Syncope [R55]   • Atrial fibrillation (CMS-HCC) [I48.91]   • Paroxysmal atrial fibrillation (CMS-HCC) [I48.0]   • DM2 (diabetes mellitus, type 2) (CMS-HCC) [E11.9]   • Right foot ulcer (CMS-HCC) [L97.519]   • Hyperkalemia [E87.5]   • Leukocytosis [D72.829]   • Lactic acidosis [E87.2]   • Enteritis, question Crohn's on imaging [K52.9]   • Sepsis (CMS-HCC) [A41.9]   • Volume depletion [E86.9]   • COPD  (chronic obstructive pulmonary disease) (CMS-HCC) [J44.9]   • DM type 2 (diabetes mellitus, type 2) (CMS-HCC) [E11.9]   • Hypotension (arterial) [I95.9]   • Acute kidney injury (nontraumatic) (CMS-HCC) [N17.9]     pAF  Back on amiodarone  Anticoagulation please check with insurance plan if Xarelto or eliquis is affordable, he loves dark green vegetables  Will overalp gtts with oral amiodarone load    Will follow along    Future Appointments  Date Time Provider Department Center   5/31/2017 2:40 PM FREDA Liu. [facility] None         It is my pleasure to participate in the care of Mr. Aguilar.  Please do not hesitate to contact me with questions or concerns.    Loyd Hou MD PhD FAC  Cardiologist [facility]    Core Measures

## 2017-05-06 NOTE — CONSULTS
[id number]    pAF    Please have care coordination check to see if the newer oral anticoagulants such as eliquis 5 mg BID or Xarelto 20 mg daily are covered by his insurance otherwise start on coumadin    It is my pleasure to participate in the care of Mr. Aguilar.  Please do not hesitate to contact me with questions or concerns.    Loyd Hou MD PhD FACC  Cardiologist [facility]

## 2017-05-07 ENCOUNTER — PATIENT OUTREACH (OUTPATIENT)
Dept: HEALTH INFORMATION MANAGEMENT | Facility: OTHER | Age: 62
End: 2017-05-07

## 2017-05-07 LAB
ALBUMIN SERPL BCP-MCNC: 3.4 G/DL (ref 3.2–4.9)
ALBUMIN/GLOB SERPL: 1.2 G/DL
ALP SERPL-CCNC: 53 U/L (ref 30–99)
ALT SERPL-CCNC: <5 U/L (ref 2–50)
ANION GAP SERPL CALC-SCNC: 7 MMOL/L (ref 0–11.9)
AST SERPL-CCNC: 7 U/L (ref 12–45)
BASOPHILS # BLD AUTO: 0.9 % (ref 0–1.8)
BASOPHILS # BLD: 0.1 K/UL (ref 0–0.12)
BILIRUB SERPL-MCNC: 0.4 MG/DL (ref 0.1–1.5)
BUN SERPL-MCNC: 9 MG/DL (ref 8–22)
CALCIUM SERPL-MCNC: 8.8 MG/DL (ref 8.5–10.5)
CHLORIDE SERPL-SCNC: 106 MMOL/L (ref 96–112)
CO2 SERPL-SCNC: 27 MMOL/L (ref 20–33)
CREAT SERPL-MCNC: 0.84 MG/DL (ref 0.5–1.4)
EOSINOPHIL # BLD AUTO: 0.97 K/UL (ref 0–0.51)
EOSINOPHIL NFR BLD: 8.7 % (ref 0–6.9)
ERYTHROCYTE [DISTWIDTH] IN BLOOD BY AUTOMATED COUNT: 41 FL (ref 35.9–50)
GFR SERPL CREATININE-BSD FRML MDRD: >60 ML/MIN/1.73 M 2
GLOBULIN SER CALC-MCNC: 2.9 G/DL (ref 1.9–3.5)
GLUCOSE BLD-MCNC: 116 MG/DL (ref 65–99)
GLUCOSE BLD-MCNC: 139 MG/DL (ref 65–99)
GLUCOSE BLD-MCNC: 139 MG/DL (ref 65–99)
GLUCOSE BLD-MCNC: 145 MG/DL (ref 65–99)
GLUCOSE SERPL-MCNC: 143 MG/DL (ref 65–99)
HCT VFR BLD AUTO: 37 % (ref 42–52)
HGB BLD-MCNC: 11.4 G/DL (ref 14–18)
IMM GRANULOCYTES # BLD AUTO: 0.03 K/UL (ref 0–0.11)
IMM GRANULOCYTES NFR BLD AUTO: 0.3 % (ref 0–0.9)
LYMPHOCYTES # BLD AUTO: 1.89 K/UL (ref 1–4.8)
LYMPHOCYTES NFR BLD: 16.9 % (ref 22–41)
MAGNESIUM SERPL-MCNC: 1.5 MG/DL (ref 1.5–2.5)
MCH RBC QN AUTO: 27.7 PG (ref 27–33)
MCHC RBC AUTO-ENTMCNC: 30.8 G/DL (ref 33.7–35.3)
MCV RBC AUTO: 89.8 FL (ref 81.4–97.8)
MONOCYTES # BLD AUTO: 0.52 K/UL (ref 0–0.85)
MONOCYTES NFR BLD AUTO: 4.6 % (ref 0–13.4)
NEUTROPHILS # BLD AUTO: 7.68 K/UL (ref 1.82–7.42)
NEUTROPHILS NFR BLD: 68.6 % (ref 44–72)
NRBC # BLD AUTO: 0 K/UL
NRBC BLD AUTO-RTO: 0 /100 WBC
PHOSPHATE SERPL-MCNC: 2.9 MG/DL (ref 2.5–4.5)
PLATELET # BLD AUTO: 232 K/UL (ref 164–446)
PMV BLD AUTO: 10.4 FL (ref 9–12.9)
POTASSIUM SERPL-SCNC: 4.4 MMOL/L (ref 3.6–5.5)
PROT SERPL-MCNC: 6.3 G/DL (ref 6–8.2)
RBC # BLD AUTO: 4.12 M/UL (ref 4.7–6.1)
SODIUM SERPL-SCNC: 140 MMOL/L (ref 135–145)
WBC # BLD AUTO: 11.2 K/UL (ref 4.8–10.8)

## 2017-05-07 PROCEDURE — 85025 COMPLETE CBC W/AUTO DIFF WBC: CPT

## 2017-05-07 PROCEDURE — A9270 NON-COVERED ITEM OR SERVICE: HCPCS | Performed by: HOSPITALIST

## 2017-05-07 PROCEDURE — A9270 NON-COVERED ITEM OR SERVICE: HCPCS | Performed by: INTERNAL MEDICINE

## 2017-05-07 PROCEDURE — 83735 ASSAY OF MAGNESIUM: CPT

## 2017-05-07 PROCEDURE — 84100 ASSAY OF PHOSPHORUS: CPT

## 2017-05-07 PROCEDURE — 700101 HCHG RX REV CODE 250: Performed by: INTERNAL MEDICINE

## 2017-05-07 PROCEDURE — 700105 HCHG RX REV CODE 258: Performed by: INTERNAL MEDICINE

## 2017-05-07 PROCEDURE — 80053 COMPREHEN METABOLIC PANEL: CPT

## 2017-05-07 PROCEDURE — 700102 HCHG RX REV CODE 250 W/ 637 OVERRIDE(OP): Performed by: INTERNAL MEDICINE

## 2017-05-07 PROCEDURE — 700105 HCHG RX REV CODE 258: Performed by: HOSPITALIST

## 2017-05-07 PROCEDURE — 94640 AIRWAY INHALATION TREATMENT: CPT

## 2017-05-07 PROCEDURE — 99232 SBSQ HOSP IP/OBS MODERATE 35: CPT | Performed by: HOSPITALIST

## 2017-05-07 PROCEDURE — 94760 N-INVAS EAR/PLS OXIMETRY 1: CPT

## 2017-05-07 PROCEDURE — 36415 COLL VENOUS BLD VENIPUNCTURE: CPT

## 2017-05-07 PROCEDURE — 82962 GLUCOSE BLOOD TEST: CPT | Mod: 91

## 2017-05-07 PROCEDURE — 700102 HCHG RX REV CODE 250 W/ 637 OVERRIDE(OP): Performed by: HOSPITALIST

## 2017-05-07 PROCEDURE — 770020 HCHG ROOM/CARE - TELE (206)

## 2017-05-07 PROCEDURE — 700111 HCHG RX REV CODE 636 W/ 250 OVERRIDE (IP): Performed by: HOSPITALIST

## 2017-05-07 RX ADMIN — CITALOPRAM HYDROBROMIDE 10 MG: 20 TABLET ORAL at 08:10

## 2017-05-07 RX ADMIN — ENOXAPARIN SODIUM 100 MG: 100 INJECTION SUBCUTANEOUS at 21:31

## 2017-05-07 RX ADMIN — AMIODARONE HYDROCHLORIDE 400 MG: 200 TABLET ORAL at 21:31

## 2017-05-07 RX ADMIN — ALBUTEROL SULFATE 2.5 MG: 2.5 SOLUTION RESPIRATORY (INHALATION) at 22:20

## 2017-05-07 RX ADMIN — ALBUTEROL SULFATE 2 PUFF: 90 AEROSOL, METERED RESPIRATORY (INHALATION) at 05:02

## 2017-05-07 RX ADMIN — ENOXAPARIN SODIUM 100 MG: 100 INJECTION SUBCUTANEOUS at 08:10

## 2017-05-07 RX ADMIN — PIPERACILLIN SODIUM AND TAZOBACTAM SODIUM 3.38 G: 3; .375 INJECTION, POWDER, FOR SOLUTION INTRAVENOUS at 21:28

## 2017-05-07 RX ADMIN — RISPERIDONE 3 MG: 0.5 TABLET, FILM COATED ORAL at 22:43

## 2017-05-07 RX ADMIN — DEXTROSE MONOHYDRATE 500 ML: 50 INJECTION, SOLUTION INTRAVENOUS at 16:59

## 2017-05-07 RX ADMIN — SODIUM CHLORIDE: 9 INJECTION, SOLUTION INTRAVENOUS at 17:00

## 2017-05-07 RX ADMIN — AMIODARONE HYDROCHLORIDE 400 MG: 200 TABLET ORAL at 08:10

## 2017-05-07 RX ADMIN — STANDARDIZED SENNA CONCENTRATE AND DOCUSATE SODIUM 1 TABLET: 8.6; 5 TABLET, FILM COATED ORAL at 21:32

## 2017-05-07 RX ADMIN — GABAPENTIN 400 MG: 400 CAPSULE ORAL at 13:55

## 2017-05-07 RX ADMIN — PIPERACILLIN SODIUM AND TAZOBACTAM SODIUM 3.38 G: 3; .375 INJECTION, POWDER, FOR SOLUTION INTRAVENOUS at 02:54

## 2017-05-07 RX ADMIN — METOPROLOL TARTRATE 25 MG: 25 TABLET, FILM COATED ORAL at 21:31

## 2017-05-07 RX ADMIN — PIPERACILLIN SODIUM AND TAZOBACTAM SODIUM 3.38 G: 3; .375 INJECTION, POWDER, FOR SOLUTION INTRAVENOUS at 08:10

## 2017-05-07 RX ADMIN — LOVASTATIN 40 MG: 20 TABLET ORAL at 21:31

## 2017-05-07 RX ADMIN — TIOTROPIUM BROMIDE 1 CAPSULE: 18 CAPSULE ORAL; RESPIRATORY (INHALATION) at 08:10

## 2017-05-07 RX ADMIN — PIPERACILLIN SODIUM AND TAZOBACTAM SODIUM 3.38 G: 3; .375 INJECTION, POWDER, FOR SOLUTION INTRAVENOUS at 13:55

## 2017-05-07 RX ADMIN — ALBUTEROL SULFATE 2.5 MG: 2.5 SOLUTION RESPIRATORY (INHALATION) at 11:25

## 2017-05-07 RX ADMIN — GABAPENTIN 400 MG: 400 CAPSULE ORAL at 21:00

## 2017-05-07 RX ADMIN — GABAPENTIN 400 MG: 400 CAPSULE ORAL at 08:10

## 2017-05-07 RX ADMIN — METOPROLOL TARTRATE 25 MG: 25 TABLET, FILM COATED ORAL at 08:10

## 2017-05-07 ASSESSMENT — ENCOUNTER SYMPTOMS
MYALGIAS: 0
CONSTIPATION: 0
BLURRED VISION: 0
BLOOD IN STOOL: 0
EYES NEGATIVE: 1
PHOTOPHOBIA: 0
TREMORS: 0
CLAUDICATION: 0
SPUTUM PRODUCTION: 0
DEPRESSION: 0
CHILLS: 0
HEMOPTYSIS: 0
VOMITING: 0
PSYCHIATRIC NEGATIVE: 1
ABDOMINAL PAIN: 0
DIARRHEA: 0
NECK PAIN: 0
EYE PAIN: 0
HEADACHES: 0
MUSCULOSKELETAL NEGATIVE: 1
WEAKNESS: 0
MEMORY LOSS: 0
TINGLING: 0
FEVER: 0
SORE THROAT: 0
HEARTBURN: 0
PALPITATIONS: 0
RESPIRATORY NEGATIVE: 1
NAUSEA: 0
SHORTNESS OF BREATH: 0
DIZZINESS: 0
STRIDOR: 0
COUGH: 0
DOUBLE VISION: 0
CARDIOVASCULAR NEGATIVE: 1
BACK PAIN: 0
NERVOUS/ANXIOUS: 0
PND: 0
SPEECH CHANGE: 0
SENSORY CHANGE: 0
ORTHOPNEA: 0

## 2017-05-07 ASSESSMENT — PAIN SCALES - GENERAL
PAINLEVEL_OUTOF10: 0
PAINLEVEL_OUTOF10: 2
PAINLEVEL_OUTOF10: 0
PAINLEVEL_OUTOF10: 0

## 2017-05-07 NOTE — CARE PLAN
Problem: Safety  Goal: Will remain free from injury  Outcome: PROGRESSING AS EXPECTED  Bed locked and in lowest position. Bed alarm on. Treaded socks. Call light and belongings within reach. Patient educated to call for assistance. Pt verbalized understanding. Hourly rounding in place.        Problem: Skin Integrity  Goal: Risk for impaired skin integrity will decrease  Outcome: PROGRESSING AS EXPECTED  Educated patient about the importance of frequent repositioning to prevent skin breakdown. Patient expressed understanding of importance of repositioning.

## 2017-05-07 NOTE — PROGRESS NOTES
Bedside report received by bandar Bangura. Patient sitting up in bed watching TV at this time. POC discussed, verbalized understanding. No immediate concerns for patient at this time. All safety measures in place.

## 2017-05-07 NOTE — PROGRESS NOTES
Right are PIV infiltrated running Amiodarone. RN stopped infusion and notified pharmacy and charge RN. Elevated pt arm, outlined the area on right forearm, and applied ice. Primary RN switched the drip to left forearm IV.

## 2017-05-07 NOTE — PROGRESS NOTES
Hospital Medicine Progress Note, Adult, Complex               Author: Nathalie Licona Date & Time created: 5/7/2017  1:14 PM     Interval History:    61 y.o male with PMHx of Type II DM, Asthma, HTN, admitted for snycope, found to be hypotensive, requiring pressor support. In addition Paroxysmal atrial fibrillation, which chemical cardioversion was performed.     Patient went back into atrial fibrillation. Patient was placed back on amiodarone gtt, anticoagulation started with lovenox weight based   Sepsis resolved, continuing zosyn for broad coverage.     5/7  Patient reports feeling much better today. Patient denies fevers/chills, chest pain, shortness of breath or nausea/vommiting.   Continue amiodarone gtt, with PO, transition per cardiology  Continue anticoagulation with lovenox.  Leukocytosis improving, continue IV Zosyn.    Review of Systems:  Review of Systems   Constitutional: Positive for malaise/fatigue. Negative for fever and chills.   HENT: Negative.  Negative for congestion, hearing loss, sore throat and tinnitus.    Eyes: Negative.  Negative for blurred vision, double vision, photophobia and pain.   Respiratory: Negative.  Negative for cough, hemoptysis, sputum production, shortness of breath and stridor.    Cardiovascular: Negative.  Negative for chest pain, palpitations, orthopnea, claudication and PND.   Gastrointestinal: Negative for heartburn, nausea, vomiting, abdominal pain, diarrhea, constipation, blood in stool and melena.   Genitourinary: Negative.  Negative for dysuria, urgency and frequency.   Musculoskeletal: Negative.  Negative for myalgias, back pain and neck pain.   Skin: Negative.  Negative for rash.   Neurological: Negative for dizziness, tingling, tremors, sensory change, speech change, weakness and headaches.   Endo/Heme/Allergies: Negative.    Psychiatric/Behavioral: Negative.  Negative for depression, suicidal ideas and memory loss. The patient is not nervous/anxious.    All  other systems reviewed and are negative.      Physical Exam:  Physical Exam   Constitutional: He is oriented to person, place, and time. He appears well-developed and well-nourished.   HENT:   Head: Normocephalic and atraumatic.   Mouth/Throat: No oropharyngeal exudate.   Eyes: Conjunctivae are normal. Pupils are equal, round, and reactive to light. Right eye exhibits no discharge. No scleral icterus.   Neck: Normal range of motion. Neck supple. No JVD present. No thyromegaly present.   Cardiovascular: Normal rate, regular rhythm, normal heart sounds and intact distal pulses.    No murmur heard.  Pulmonary/Chest: Effort normal and breath sounds normal. No stridor. No respiratory distress. He has no wheezes. He has no rales.   Abdominal: Soft. Bowel sounds are normal. He exhibits no distension. There is no tenderness. There is no rebound and no guarding.   Genitourinary: Rectum normal and penis normal.   Musculoskeletal: Normal range of motion. He exhibits no edema.   Neurological: He is alert and oriented to person, place, and time. No cranial nerve deficit.   Skin: Skin is warm. No erythema.   Psychiatric: He has a normal mood and affect. His behavior is normal. Thought content normal.   Nursing note and vitals reviewed.      Labs:        Invalid input(s): [id number]  Recent Labs      05/05/17   1420   TROPONINI  <0.01     Recent Labs      05/05/17 0330  05/06/17 0522 05/07/17   0452   SODIUM  135  139  140   POTASSIUM  4.6  4.5  4.4   CHLORIDE  105  106  106   CO2  25  30  27   BUN  11  11  9   CREATININE  1.00  1.06  0.84   MAGNESIUM  1.6  1.7  1.5   PHOSPHORUS  2.8  2.6  2.9   CALCIUM  8.6  9.0  8.8     Recent Labs      05/05/17   0330  05/06/17 0522 05/07/17   0452   ALTSGPT  5  <5  <5   ASTSGOT  8*  7*  7*   ALKPHOSPHAT  53  51  53   TBILIRUBIN  0.4  0.3  0.4   GLUCOSE  124*  120*  143*     Recent Labs      05/05/17   0330  05/06/17 0522 05/07/17   0452   RBC  4.21*  4.05*  4.12*   HEMOGLOBIN   11.9*  11.3*  11.4*   HEMATOCRIT  38.5*  36.3*  37.0*   PLATELETCT  252  240  232     Recent Labs      17   0330  17   0522  17   0452   WBC  12.8*  12.8*  11.2*   NEUTSPOLYS  70.70  72.60*  68.60   LYMPHOCYTES  17.20*  15.00*  16.90*   MONOCYTES  5.10  4.70  4.60   EOSINOPHILS  6.00  6.70  8.70*   BASOPHILS  0.50  0.50  0.90   ASTSGOT  8*  7*  7*   ALTSGPT  5  <5  <5   ALKPHOSPHAT  53  51  53   TBILIRUBIN  0.4  0.3  0.4           Hemodynamics:  Temp (24hrs), Av.3 °C (97.3 °F), Min:36.1 °C (97 °F), Max:36.6 °C (97.8 °F)  Temperature: 36.4 °C (97.5 °F)  Pulse  Av.5  Min: 51  Max: 129  Blood Pressure: 122/70 mmHg    Respiratory:    Respiration: 19, Pulse Oximetry: 98 %, O2 Daily Delivery Respiratory : Silicone Nasal Cannula     Given By:: Mouthpiece, Work Of Breathing / Effort: Mild  RUL Breath Sounds: Diminished;Expiratory Wheezes, RML Breath Sounds: Diminished, RLL Breath Sounds: Diminished, FERMIN Breath Sounds: Diminished;Expiratory Wheezes, LLL Breath Sounds: Diminished  Fluids:    Intake/Output Summary (Last 24 hours) at 17 1314  Last data filed at 17 1016   Gross per 24 hour   Intake      0 ml   Output    800 ml   Net   -800 ml     Weight: 96.9 kg (213 lb 10 oz)  GI/Nutrition:  Orders Placed This Encounter   Procedures   • DIET ORDER     Standing Status: Standing      Number of Occurrences: 1      Standing Expiration Date:      Order Specific Question:  Diet:     Answer:  Diabetic [3]     Order Specific Question:  Texture/Fiber modifications:     Answer:  Dysphagia 3(Mechanical Soft)specify fluid consistency(question 6) [3]     Order Specific Question:  Miscellaneous modifications:     Answer:  No Caffeine [12]     Medical Decision Making, by Problem:  Active Hospital Problems    Diagnosis   • *Syncope [R55]   - Most likely secondary from sepsis causing dehydration  - echocardiogram: preserved EF.  - MPI in 2016: nromal.  - symptoms resolved     • Right foot ulcer (CMS-HCC)  [L97.519]   - continue wound care, does not appear to be infected.     • Hyperkalemia [E87.5]   - resolved     • Leukocytosis [D72.829]  - slowly improving, check daily cbc .     • Lactic acidosis [E87.2]  - resolved     • Gastroenteritis  - his symptoms 2/2 to viral? Unsure,  - continue IV zosyn empirically. Cultures neg thus far.     • Sepsis (CMS-HCC) [A41.9]   - off pressors, continue antibiotics.     • Volume depletion [E86.9]   - cont gentle IVF.     • COPD (chronic obstructive pulmonary disease) (CMS-HCC) [J44.9]   - Continue RT protocol, duo nebs, Pep therapy if warranted, and incentive spirometry.      • DM type 2 (diabetes mellitus, type 2) (CMS-HCC) [E11.9]   - Continue Insulin-sliding scale, accu-checks and hypoglycemia protocol.     • Hypotension (arterial) [I95.9]   - resolved     • Acute kidney injury (nontraumatic) (CMS-HCC) [N17.9]  - resolved.     Atrial fibrillation with rapid rate.  - Continue amiodarone gtt, rate controlled   - continue lovenox  -  cont metoprolol.  - cardiology following.    Normocytic anemia  - No signs of gross bleeding, continue to monitor daily cbc for acute changes.      Patient plan of care discussed at multidisplinary team rounds and with patient and R.N at beside.      The total time spent face to face with this patient was about 38 mins of which 60% of time was spent on counseling, review of records including pertinent lab data and studies, as well as discussing diagnostic evaluation and work up, planned therapeutic interventions and future disposition of care. Where indicated, the assessment and plan reflect discussion of patient with consultants, other healthcare providers, family members, and additional research needed to obtain further information in formulating the plan of care of this patient.         Medications reviewed, Labs reviewed and Radiology images reviewed  Yañez catheter: Strict Intake and Output During Sepisis or Shock  Central line in place:  Sepsis    DVT Prophylaxis: Heparin      Antibiotics: Treating active infection/contamination beyond 24 hours perioperative coverage  Assessed for rehab: Patient unable to tolerate rehabilitation therapeutic regimen

## 2017-05-07 NOTE — PROGRESS NOTES
Cardiology Progress Note               Author: Marilyn Cabezas Date & Time created: 2017  12:05 PM     Interval History:  Consultation for new JULIET parker with [facility] on 17    Admitted with syncope, complaint of abdominal pain ×2 days, concern for sepsis of GI origin    History of COPD, diabetes2, hyperlipidemia, anxiety, echo on  showed preserved EF no significant valve disease, MPI normally 2016, asthma, obstructive sleep apnea, marijuana dependence    Labs reviewed  Leukocytosis improving (25---> 11)  Sodium, potassium, creatinine stable  Troponin negative  TSH normal    BP = 122/70  HR = 55 NSR       Review of Systems   Respiratory: Negative for cough and shortness of breath.    Cardiovascular: Negative for chest pain, palpitations, orthopnea, claudication and PND.       Physical Exam   Constitutional: He is oriented to person, place, and time.   HENT:   Head: Normocephalic.   Neck: No JVD present. No thyromegaly present.   Cardiovascular: Normal rate and regular rhythm.    Pulses:       Carotid pulses are 2+ on the right side, and 2+ on the left side.       Radial pulses are 2+ on the right side, and 2+ on the left side.        Posterior tibial pulses are 2+ on the right side, and 2+ on the left side.   Pulmonary/Chest: He has no wheezes.   Abdominal: Soft.   Musculoskeletal: He exhibits no edema.   Neurological: He is alert and oriented to person, place, and time.   Skin: Skin is warm and dry.       Hemodynamics:  Temp (24hrs), Av.3 °C (97.3 °F), Min:36.1 °C (97 °F), Max:36.6 °C (97.8 °F)  Temperature: 36.4 °C (97.5 °F)  Pulse  Av.5  Min: 51  Max: 129  Blood Pressure: 122/70 mmHg    Respiratory:    Respiration: 19, Pulse Oximetry: 98 %, O2 Daily Delivery Respiratory : Silicone Nasal Cannula     Given By:: Mouthpiece, Work Of Breathing / Effort: Mild  RUL Breath Sounds: Diminished;Expiratory Wheezes, RML Breath Sounds: Diminished, RLL Breath Sounds: Diminished, FERMIN Breath Sounds:  Diminished;Expiratory Wheezes, LLL Breath Sounds: Diminished  Fluids:  Date 05/07/17 0700 - 05/08/17 0659   Shift 4144-6448 5493-4768 6118-0051 24 Hour Total   I  N  T  A  K  E   Shift Total       O  U  T  P  U  T   Urine 400   400    Shift Total 400   400   Weight (kg) 96.9 96.9 96.9 96.9       Weight: 96.9 kg (213 lb 10 oz)  GI/Nutrition:  Orders Placed This Encounter   Procedures   • DIET ORDER     Standing Status: Standing      Number of Occurrences: 1      Standing Expiration Date:      Order Specific Question:  Diet:     Answer:  Diabetic [3]     Order Specific Question:  Texture/Fiber modifications:     Answer:  Dysphagia 3(Mechanical Soft)specify fluid consistency(question 6) [3]     Order Specific Question:  Miscellaneous modifications:     Answer:  No Caffeine [12]     Lab Results:  Recent Labs      05/05/17 0330 05/06/17   0522 05/07/17   0452   WBC  12.8*  12.8*  11.2*   RBC  4.21*  4.05*  4.12*   HEMOGLOBIN  11.9*  11.3*  11.4*   HEMATOCRIT  38.5*  36.3*  37.0*   MCV  91.4  89.6  89.8   MCH  28.3  27.9  27.7   MCHC  30.9*  31.1*  30.8*   RDW  42.7  40.3  41.0   PLATELETCT  252  240  232   MPV  10.8  10.6  10.4     Recent Labs      05/05/17   0330 05/06/17   0522 05/07/17   0452   SODIUM  135  139  140   POTASSIUM  4.6  4.5  4.4   CHLORIDE  105  106  106   CO2  25  30  27   GLUCOSE  124*  120*  143*   BUN  11  11  9   CREATININE  1.00  1.06  0.84   CALCIUM  8.6  9.0  8.8             Recent Labs      05/05/17   1420   TROPONINI  <0.01             Medical Decision Making, by Problem:  Active Hospital Problems    Diagnosis   • *Syncope [R55]   • Atrial fibrillation (CMS-HCC) [I48.91]   • Paroxysmal atrial fibrillation (CMS-HCC) [I48.0]   • DM2 (diabetes mellitus, type 2) (CMS-HCC) [E11.9]   • Right foot ulcer (CMS-HCC) [L97.519]   • Hyperkalemia [E87.5]   • Leukocytosis [D72.829]   • Lactic acidosis [E87.2]   • Enteritis, question Crohn's on imaging [K52.9]   • Sepsis (CMS-HCC) [A41.9]   • Volume  depletion [E86.9]   • COPD (chronic obstructive pulmonary disease) (CMS-HCC) [J44.9]   • DM type 2 (diabetes mellitus, type 2) (CMS-HCC) [E11.9]   • Hypotension (arterial) [I95.9]   • Acute kidney injury (nontraumatic) (CMS-HCC) [N17.9]       Plan:     Consultation for new PAF ( 5/4/17 ), maintaining normal sinus rhythm on IV & PO  amiodarone    On metoprolol 25 BID    On therapeutic Lovenox    Presented with syncope (likely secondary from sepsis and dehydration), no recurrence     Preserved EF 65% (2/2017)    will follow   Labs reviewed and Medications reviewed

## 2017-05-08 VITALS
SYSTOLIC BLOOD PRESSURE: 115 MMHG | RESPIRATION RATE: 16 BRPM | HEIGHT: 72 IN | DIASTOLIC BLOOD PRESSURE: 66 MMHG | TEMPERATURE: 97.3 F | HEART RATE: 69 BPM | WEIGHT: 217.37 LBS | BODY MASS INDEX: 29.44 KG/M2 | OXYGEN SATURATION: 94 %

## 2017-05-08 PROBLEM — A41.9 SEPSIS, UNSPECIFIED: Status: RESOLVED | Noted: 2017-05-03 | Resolved: 2017-05-08

## 2017-05-08 PROBLEM — I95.9 HYPOTENSION (ARTERIAL): Status: RESOLVED | Noted: 2017-02-22 | Resolved: 2017-05-08

## 2017-05-08 PROBLEM — R55 SYNCOPE: Status: RESOLVED | Noted: 2017-02-20 | Resolved: 2017-05-08

## 2017-05-08 PROBLEM — K52.9 ENTERITIS: Status: RESOLVED | Noted: 2017-05-03 | Resolved: 2017-05-08

## 2017-05-08 PROBLEM — D72.829 LEUKOCYTOSIS: Status: RESOLVED | Noted: 2017-05-03 | Resolved: 2017-05-08

## 2017-05-08 PROBLEM — E86.9 VOLUME DEPLETION: Status: RESOLVED | Noted: 2017-05-03 | Resolved: 2017-05-08

## 2017-05-08 PROBLEM — E87.20 LACTIC ACIDOSIS: Status: RESOLVED | Noted: 2017-05-03 | Resolved: 2017-05-08

## 2017-05-08 PROBLEM — N17.9 ACUTE KIDNEY INJURY (NONTRAUMATIC) (HCC): Status: RESOLVED | Noted: 2017-02-22 | Resolved: 2017-05-08

## 2017-05-08 PROBLEM — E87.5 HYPERKALEMIA: Status: RESOLVED | Noted: 2017-05-03 | Resolved: 2017-05-08

## 2017-05-08 LAB
ALBUMIN SERPL BCP-MCNC: 3.3 G/DL (ref 3.2–4.9)
ALBUMIN/GLOB SERPL: 1.2 G/DL
ALP SERPL-CCNC: 51 U/L (ref 30–99)
ALT SERPL-CCNC: <5 U/L (ref 2–50)
ANION GAP SERPL CALC-SCNC: 6 MMOL/L (ref 0–11.9)
AST SERPL-CCNC: 7 U/L (ref 12–45)
BACTERIA BLD CULT: NORMAL
BACTERIA BLD CULT: NORMAL
BASOPHILS # BLD AUTO: 0.8 % (ref 0–1.8)
BASOPHILS # BLD: 0.09 K/UL (ref 0–0.12)
BILIRUB SERPL-MCNC: 0.4 MG/DL (ref 0.1–1.5)
BUN SERPL-MCNC: 10 MG/DL (ref 8–22)
CALCIUM SERPL-MCNC: 8.8 MG/DL (ref 8.5–10.5)
CHLORIDE SERPL-SCNC: 103 MMOL/L (ref 96–112)
CO2 SERPL-SCNC: 30 MMOL/L (ref 20–33)
CREAT SERPL-MCNC: 0.86 MG/DL (ref 0.5–1.4)
EKG IMPRESSION: NORMAL
EOSINOPHIL # BLD AUTO: 1.05 K/UL (ref 0–0.51)
EOSINOPHIL NFR BLD: 9.6 % (ref 0–6.9)
ERYTHROCYTE [DISTWIDTH] IN BLOOD BY AUTOMATED COUNT: 39.6 FL (ref 35.9–50)
GFR SERPL CREATININE-BSD FRML MDRD: >60 ML/MIN/1.73 M 2
GLOBULIN SER CALC-MCNC: 2.7 G/DL (ref 1.9–3.5)
GLUCOSE BLD-MCNC: 117 MG/DL (ref 65–99)
GLUCOSE BLD-MCNC: 150 MG/DL (ref 65–99)
GLUCOSE SERPL-MCNC: 123 MG/DL (ref 65–99)
HCT VFR BLD AUTO: 34.9 % (ref 42–52)
HGB BLD-MCNC: 11 G/DL (ref 14–18)
IMM GRANULOCYTES # BLD AUTO: 0.03 K/UL (ref 0–0.11)
IMM GRANULOCYTES NFR BLD AUTO: 0.3 % (ref 0–0.9)
LYMPHOCYTES # BLD AUTO: 1.75 K/UL (ref 1–4.8)
LYMPHOCYTES NFR BLD: 16 % (ref 22–41)
MAGNESIUM SERPL-MCNC: 1.5 MG/DL (ref 1.5–2.5)
MCH RBC QN AUTO: 28 PG (ref 27–33)
MCHC RBC AUTO-ENTMCNC: 31.5 G/DL (ref 33.7–35.3)
MCV RBC AUTO: 88.8 FL (ref 81.4–97.8)
MONOCYTES # BLD AUTO: 0.59 K/UL (ref 0–0.85)
MONOCYTES NFR BLD AUTO: 5.4 % (ref 0–13.4)
NEUTROPHILS # BLD AUTO: 7.43 K/UL (ref 1.82–7.42)
NEUTROPHILS NFR BLD: 67.9 % (ref 44–72)
NRBC # BLD AUTO: 0 K/UL
NRBC BLD AUTO-RTO: 0 /100 WBC
PHOSPHATE SERPL-MCNC: 3.7 MG/DL (ref 2.5–4.5)
PLATELET # BLD AUTO: 242 K/UL (ref 164–446)
PMV BLD AUTO: 10.6 FL (ref 9–12.9)
POTASSIUM SERPL-SCNC: 4.2 MMOL/L (ref 3.6–5.5)
PROT SERPL-MCNC: 6 G/DL (ref 6–8.2)
RBC # BLD AUTO: 3.93 M/UL (ref 4.7–6.1)
SIGNIFICANT IND 70042: NORMAL
SIGNIFICANT IND 70042: NORMAL
SITE SITE: NORMAL
SITE SITE: NORMAL
SODIUM SERPL-SCNC: 139 MMOL/L (ref 135–145)
SOURCE SOURCE: NORMAL
SOURCE SOURCE: NORMAL
WBC # BLD AUTO: 10.9 K/UL (ref 4.8–10.8)

## 2017-05-08 PROCEDURE — 83735 ASSAY OF MAGNESIUM: CPT

## 2017-05-08 PROCEDURE — 700102 HCHG RX REV CODE 250 W/ 637 OVERRIDE(OP): Performed by: INTERNAL MEDICINE

## 2017-05-08 PROCEDURE — 700111 HCHG RX REV CODE 636 W/ 250 OVERRIDE (IP): Performed by: HOSPITALIST

## 2017-05-08 PROCEDURE — A9270 NON-COVERED ITEM OR SERVICE: HCPCS | Performed by: INTERNAL MEDICINE

## 2017-05-08 PROCEDURE — 80053 COMPREHEN METABOLIC PANEL: CPT

## 2017-05-08 PROCEDURE — 700105 HCHG RX REV CODE 258: Performed by: HOSPITALIST

## 2017-05-08 PROCEDURE — 700102 HCHG RX REV CODE 250 W/ 637 OVERRIDE(OP): Performed by: HOSPITALIST

## 2017-05-08 PROCEDURE — 36415 COLL VENOUS BLD VENIPUNCTURE: CPT

## 2017-05-08 PROCEDURE — 84100 ASSAY OF PHOSPHORUS: CPT

## 2017-05-08 PROCEDURE — A9270 NON-COVERED ITEM OR SERVICE: HCPCS | Performed by: HOSPITALIST

## 2017-05-08 PROCEDURE — 85025 COMPLETE CBC W/AUTO DIFF WBC: CPT

## 2017-05-08 PROCEDURE — 99239 HOSP IP/OBS DSCHRG MGMT >30: CPT | Performed by: HOSPITALIST

## 2017-05-08 PROCEDURE — 82962 GLUCOSE BLOOD TEST: CPT

## 2017-05-08 RX ORDER — AMIODARONE HYDROCHLORIDE 400 MG/1
400 TABLET ORAL 2 TIMES DAILY
Qty: 30 TAB | Refills: 3 | Status: SHIPPED | OUTPATIENT
Start: 2017-05-08 | End: 2017-05-31

## 2017-05-08 RX ORDER — ALBUTEROL SULFATE 90 UG/1
2 AEROSOL, METERED RESPIRATORY (INHALATION)
Qty: 8.5 G | Refills: 3 | Status: SHIPPED | OUTPATIENT
Start: 2017-05-08

## 2017-05-08 RX ORDER — GABAPENTIN 300 MG/1
300 CAPSULE ORAL 3 TIMES DAILY
Qty: 90 CAP | Refills: 3 | Status: SHIPPED | OUTPATIENT
Start: 2017-05-08 | End: 2017-05-08

## 2017-05-08 RX ORDER — CEPHALEXIN 250 MG/1
250 CAPSULE ORAL 4 TIMES DAILY
Qty: 12 CAP | Refills: 0 | Status: SHIPPED | OUTPATIENT
Start: 2017-05-08 | End: 2017-05-11

## 2017-05-08 RX ORDER — MAGNESIUM SULFATE HEPTAHYDRATE 40 MG/ML
2 INJECTION, SOLUTION INTRAVENOUS ONCE
Status: COMPLETED | OUTPATIENT
Start: 2017-05-08 | End: 2017-05-08

## 2017-05-08 RX ADMIN — GABAPENTIN 400 MG: 400 CAPSULE ORAL at 08:23

## 2017-05-08 RX ADMIN — ENOXAPARIN SODIUM 100 MG: 100 INJECTION SUBCUTANEOUS at 08:23

## 2017-05-08 RX ADMIN — OXYCODONE HYDROCHLORIDE 5 MG: 5 TABLET ORAL at 02:06

## 2017-05-08 RX ADMIN — CITALOPRAM HYDROBROMIDE 10 MG: 20 TABLET ORAL at 08:22

## 2017-05-08 RX ADMIN — METOPROLOL TARTRATE 25 MG: 25 TABLET, FILM COATED ORAL at 08:23

## 2017-05-08 RX ADMIN — PIPERACILLIN SODIUM AND TAZOBACTAM SODIUM 3.38 G: 3; .375 INJECTION, POWDER, FOR SOLUTION INTRAVENOUS at 14:30

## 2017-05-08 RX ADMIN — MAGNESIUM SULFATE IN WATER 2 G: 40 INJECTION, SOLUTION INTRAVENOUS at 14:43

## 2017-05-08 RX ADMIN — OXYCODONE HYDROCHLORIDE 5 MG: 5 TABLET ORAL at 05:31

## 2017-05-08 RX ADMIN — PIPERACILLIN SODIUM AND TAZOBACTAM SODIUM 3.38 G: 3; .375 INJECTION, POWDER, FOR SOLUTION INTRAVENOUS at 02:06

## 2017-05-08 RX ADMIN — AMIODARONE HYDROCHLORIDE 400 MG: 200 TABLET ORAL at 08:22

## 2017-05-08 RX ADMIN — TIOTROPIUM BROMIDE 1 CAPSULE: 18 CAPSULE ORAL; RESPIRATORY (INHALATION) at 08:23

## 2017-05-08 RX ADMIN — SODIUM CHLORIDE: 9 INJECTION, SOLUTION INTRAVENOUS at 07:16

## 2017-05-08 RX ADMIN — GABAPENTIN 400 MG: 400 CAPSULE ORAL at 14:42

## 2017-05-08 RX ADMIN — PIPERACILLIN SODIUM AND TAZOBACTAM SODIUM 3.38 G: 3; .375 INJECTION, POWDER, FOR SOLUTION INTRAVENOUS at 08:23

## 2017-05-08 RX ADMIN — STANDARDIZED SENNA CONCENTRATE AND DOCUSATE SODIUM 2 TABLET: 8.6; 5 TABLET, FILM COATED ORAL at 08:23

## 2017-05-08 ASSESSMENT — PAIN SCALES - GENERAL
PAINLEVEL_OUTOF10: 0
PAINLEVEL_OUTOF10: 5
PAINLEVEL_OUTOF10: 0
PAINLEVEL_OUTOF10: 0
PAINLEVEL_OUTOF10: 5

## 2017-05-08 ASSESSMENT — ENCOUNTER SYMPTOMS
ORTHOPNEA: 0
COUGH: 0
PND: 0
PALPITATIONS: 0
SHORTNESS OF BREATH: 0
CLAUDICATION: 0

## 2017-05-08 NOTE — DISCHARGE INSTRUCTIONS
Discharge Instructions    Discharged to home by car with relative. Discharged via wheelchair, hospital escort: Yes.  Special equipment needed: Not Applicable    Be sure to schedule a follow-up appointment with your primary care doctor or any specialists as instructed.     Discharge Plan:   Diet Plan: Discussed  Activity Level: Discussed  Smoking Cessation Offered:  (Daily joss smoker)  Confirmed Follow up Appointment: Appointment Scheduled  Confirmed Symptoms Management: Discussed  Medication Reconciliation Updated: Yes  Influenza Vaccine Indication: Not indicated: Previously immunized this influenza season and > 8 years of age    I understand that a diet low in cholesterol, fat, and sodium is recommended for good health. Unless I have been given specific instructions below for another diet, I accept this instruction as my diet prescription.   Other diet: Heart Healthy, Diabetic    Special Instructions: None    · Is patient discharged on Warfarin / Coumadin?   No     · Is patient Post Blood Transfusion?  No    Depression / Suicide Risk    As you are discharged from this [facility] facility, it is important to learn how to keep safe from harming yourself.    Recognize the warning signs:  · Abrupt changes in personality, positive or negative- including increase in energy   · Giving away possessions  · Change in eating patterns- significant weight changes-  positive or negative  · Change in sleeping patterns- unable to sleep or sleeping all the time   · Unwillingness or inability to communicate  · Depression  · Unusual sadness, discouragement and loneliness  · Talk of wanting to die  · Neglect of personal appearance   · Rebelliousness- reckless behavior  · Withdrawal from people/activities they love  · Confusion- inability to concentrate     If you or a loved one observes any of these behaviors or has concerns about self-harm, here's what you can do:  · Talk about it- your feelings and reasons for harming  yourself  · Remove any means that you might use to hurt yourself (examples: pills, rope, extension cords, firearm)  · Get professional help from the community (Mental Health, Substance Abuse, psychological counseling)  · Do not be alone:Call your Safe Contact- someone whom you trust who will be there for you.  · Call your local CRISIS HOTLINE 255-1544 or 822-018-8332  · Call your local Children's Mobile Crisis Response Team [place] (107) 353-3545 or wwwTELA Bio  · Call the toll free National Suicide Prevention Hotlines   · National Suicide Prevention Lifeline 160-691-SDXZ (2057)  · National Hope Line Network 800-SUICIDE (370-6879)    Rivaroxaban oral tablets  What is this medicine?  RIVAROXABAN (ri va COLEEN a ban) is an anticoagulant (blood thinner). It is used to treat blood clots in the lungs or in the veins. It is also used after knee or hip surgeries to prevent blood clots. It is also used to lower the chance of stroke in people with a medical condition called atrial fibrillation.  This medicine may be used for other purposes; ask your health care provider or pharmacist if you have questions.  COMMON BRAND NAME(S): Xarelto  What should I tell my health care provider before I take this medicine?  They need to know if you have any of these conditions:  -bleeding disorders  -bleeding in the brain  -blood in your stools (black or tarry stools) or if you have blood in your vomit  -history of stomach bleeding  -kidney disease  -liver disease  -low blood counts, like low white cell, platelet, or red cell counts  -recent or planned spinal or epidural procedure  -take medicines that treat or prevent blood clots  -an unusual or allergic reaction to rivaroxaban, other medicines, foods, dyes, or preservatives  -pregnant or trying to get pregnant  -breast-feeding  How should I use this medicine?  Take this medicine by mouth with a glass of water. Follow the directions on the prescription label. Take your  medicine at regular intervals. Do not take it more often than directed. Do not stop taking except on your doctor's advice.  If you are taking this medicine after hip or knee replacement surgery, take it with or without food.  If you are taking this medicine for atrial fibrillation, take it with your evening meal. If you are taking this medicine to treat blood clots, take it with food at the same time each day. If you are unable to swallow your tablet, you may crush the tablet and mix it in applesauce. Then, immediately eat the applesauce. You should eat more food right after you eat the applesauce containing the crushed tablet.  Talk to your pediatrician regarding the use of this medicine in children. Special care may be needed.  Overdosage: If you think you've taken too much of this medicine contact a poison control center or emergency room at once.  Overdosage: If you think you have taken too much of this medicine contact a poison control center or emergency room at once.  NOTE: This medicine is only for you. Do not share this medicine with others.  What if I miss a dose?  If you take your medicine once a day and miss a dose, take the missed dose as soon as you remember. If you take your medicine twice a day and miss a dose, take the missed dose immediately. In this instance, 2 tablets may be taken at the same time. The next day you should take 1 tablet twice a day as directed.  What may interact with this medicine?  -aspirin and aspirin-like medicines  -certain antibiotics like erythromycin, azithromycin, and clarithromycin  -certain medicines for fungal infections like ketoconazole and itraconazole  -certain medicines for irregular heart beat like amiodarone, quinidine, dronedarone  -certain medicines for seizures like carbamazepine, phenytoin  -certain medicines that treat or prevent blood clots like warfarin, enoxaparin, and dalteparin   -conivaptan  -diltiazem  -felodipine  -indinavir  -lopinavir;  ritonavir  -NSAIDS, medicines for pain and inflammation, like ibuprofen or naproxen  -ranolazine  -rifampin  -ritonavir  -Shandra's wort  -verapamil  This list may not describe all possible interactions. Give your health care provider a list of all the medicines, herbs, non-prescription drugs, or dietary supplements you use. Also tell them if you smoke, drink alcohol, or use illegal drugs. Some items may interact with your medicine.  What should I watch for while using this medicine?  Do not stop taking this medicine without first talking to your doctor. Stopping this medicine may increase your risk of having a stroke. Be sure to refill your prescription before you run out of medicine.  This medicine may increase your risk to bruise or bleed. Call your doctor or health care professional if you notice any unusual bleeding.  Be careful brushing and flossing your teeth or using a toothpick because you may bleed more easily. If you have any dental work done, tell your dentist you are receiving this medicine.  What side effects may I notice from receiving this medicine?  Side effects that you should report to your doctor or health care professional as soon as possible:  -allergic reactions like skin rash, itching or hives, swelling of the face, lips, or tongue  -back pain  -bloody or black, tarry stools  -changes in vision  -confusion, trouble speaking or understanding  -red or dark-brown urine  -redness, blistering, peeling or loosening of the skin, including inside the mouth  -severe headaches  -spitting up blood or brown material that looks like coffee grounds  -sudden numbness or weakness of the face, arm or leg  -trouble walking, dizziness, loss of balance or coordination  -unusual bruising or bleeding from the eye, gums, or nose   Side effects that usually do not require medical attention (Report these to your doctor or health care professional if they continue or are bothersome.):  -dizziness  -muscle pain  This  list may not describe all possible side effects. Call your doctor for medical advice about side effects. You may report side effects to FDA at 6-404-JCM-2745.  Where should I keep my medicine?  Keep out of the reach of children.  Store at room temperature between 15 and 30 degrees C (59 and 86 degrees F). Throw away any unused medicine after the expiration date.  NOTE: This sheet is a summary. It may not cover all possible information. If you have questions about this medicine, talk to your doctor, pharmacist, or health care provider.  © 2014, Elsevier/Gold Standard. (3/17/2014 3:32:09 PM)    Atrial Fibrillation  Atrial fibrillation is a condition that causes your heart to beat irregularly. It may also cause your heart to beat faster than normal. Atrial fibrillation can prevent your heart from pumping blood normally. It increases your risk of stroke and heart problems.  HOME CARE  · Take medications as told by your doctor.  · Only take medications that your doctor says are safe. Some medications can make the condition worse or happen again.  · If blood thinners were prescribed by your doctor, take them exactly as told. Too much can cause bleeding. Too little and you will not have the needed protection against stroke and other problems.  · Perform blood tests at home if told by your doctor.  · Perform blood tests exactly as told by your doctor.  · Do not drink alcohol.  · Do not drink beverages with caffeine such as coffee, soda, and some teas.  · Maintain a healthy weight.  · Do not use diet pills unless your doctor says they are safe. They may make heart problems worse.  · Follow diet instructions as told by your doctor.  · Exercise regularly as told by your doctor.  · Keep all follow-up appointments.  GET HELP IF:  · You notice a change in the speed, rhythm, or strength of your heartbeat.  · You suddenly begin peeing (urinating) more often.  · You get tired more easily when moving or exercising.  GET HELP RIGHT  AWAY IF:   · You have chest or belly (abdominal) pain.  · You feel sick to your stomach (nauseous).  · You are short of breath.  · You suddenly have swollen feet and ankles.  · You feel dizzy.  · You face, arms, or legs feel numb or weak.  · There is a change in your vision or speech.  MAKE SURE YOU:   · Understand these instructions.  · Will watch your condition.  · Will get help right away if you are not doing well or get worse.     This information is not intended to replace advice given to you by your health care provider. Make sure you discuss any questions you have with your health care provider.     Document Released: 09/26/2009 Document Revised: 01/08/2016 Document Reviewed: 04/13/2016  [employer] Interactive Patient Education ©2016 [employer]vier Inc.    Dehydration, Adult  Dehydration means your body does not have as much fluid as it needs. Your kidneys, brain, and heart will not work properly without the right amount of fluids and salt.   HOME CARE  · Ask your doctor how to replace body fluid losses (rehydrate).  · Drink enough fluids to keep your pee (urine) clear or pale yellow.  · Drink small amounts of fluids often if you feel sick to your stomach (nauseous) or throw up (vomit).  · Eat like you normally do.  · Avoid:  ¨ Foods or drinks high in sugar.  ¨ Bubbly (carbonated) drinks.  ¨ Juice.  ¨ Very hot or cold fluids.  ¨ Drinks with caffeine.  ¨ Fatty, greasy foods.  ¨ Alcohol.  ¨ Tobacco.  ¨ Eating too much.  ¨ Gelatin desserts.  · Wash your hands to avoid spreading germs (bacteria, viruses).  · Only take medicine as told by your doctor.  · Keep all doctor visits as told.  GET HELP RIGHT AWAY IF:   · You cannot drink something without throwing up.  · You get worse even with treatment.  · Your vomit has blood in it or looks greenish.  · Your poop (stool) has blood in it or looks black and tarry.  · You have not peed in 6 to 8 hours.  · You pee a small amount of very dark pee.  · You have a fever.  · You pass  out (faint).  · You have belly (abdominal) pain that gets worse or stays in one spot (localizes).  · You have a rash, stiff neck, or bad headache.  · You get easily annoyed, sleepy, or are hard to wake up.  · You feel weak, dizzy, or very thirsty.  MAKE SURE YOU:   · Understand these instructions.  · Will watch your condition.  · Will get help right away if you are not doing well or get worse.     This information is not intended to replace advice given to you by your health care provider. Make sure you discuss any questions you have with your health care provider.     Document Released: 10/14/2010 Document Revised: 03/11/2013 Document Reviewed: 08/06/2012  [employer] Less[employer] Interactive Patient Education ©2016 Elsevier Inc.

## 2017-05-08 NOTE — CARE PLAN
Problem: Knowledge Deficit  Goal: Knowledge of disease process/condition, treatment plan, diagnostic tests, and medications will improve  Outcome: PROGRESSING AS EXPECTED  Pt educated regarding plan of care for the night, activity, diet, and medications. Verbalized understanding.         Problem: Pain Management  Goal: Pain level will decrease to patient’s comfort goal  Outcome: PROGRESSING AS EXPECTED  Pt assessed for pain Q4h and medicated PRN. Pt instructed to notify RN of any new or increasing pain to prevent it from becoming intolerable. Verbalized understanding.

## 2017-05-08 NOTE — DISCHARGE PLANNING
SW received Xarelto prescription, faxed to [facility] pharmacy as requested.   Phone: [phone]  Fax: [phone]

## 2017-05-08 NOTE — PROGRESS NOTES
Assumed care at 1915. Bedside report received from Day RN To. Patient's chart and MAR reviewed. 12 hour chart check complete. Pt is awake in bed. Patient was updated on plan of care for the night. Questions answered and concerns addressed.  Pt denies any additional needs at this time. White board updated. Call light, phone and personal belongings within reach. Bed alarm on and working appropriately. MR: A-flutter, HR 90s. Vital signs stable.

## 2017-05-08 NOTE — DISCHARGE SUMMARY
Hospital Medicine Discharge Note     Admit Date:  5/3/2017       Discharge Date:   5/8/2017  LOS: 5 days     Primary Care Provider:    Mohsen Tamasaby, M.D.    Attending Physician:  Nathalie Licona     Discharge Diagnoses:     COPD (chronic obstructive pulmonary disease) (CMS-HCC)    DM type 2 (diabetes mellitus, type 2) (CMS-HCC)    Right foot ulcer (CMS-HCC)    Atrial fibrillation (CMS-HCC)- controlled    Paroxysmal atrial fibrillation (CMS-HCC)    DM2 (diabetes mellitus, type 2) (CMS-HCC)    Septic Shock- resolved    Gastroenteritis    Syncope- resolved     Acute kidney failure- resolved      Consultants:      Pulmonary  Cardiology    Studies:    Imaging/ Testing:      LE VENOUS DUPLEX - DVT ([facility] Only)   Normal bilateral superficial and deep venous examination of the lower    extremities.       [id number] VIEW   Unremarkable AP abdomen.      US-RENAL   No evidence of hydronephrosis.      Small amount of free fluid in the abdomen and pelvis.      DX-FOOT-2- RIGHT   Final Result      No acute bony abnormality.      DX-CHEST-PORTABLE (1 VIEW)   Right central venous line in place. No pneumothorax.      DX-CHEST-PORTABLE (1 VIEW)   Final Result      No acute cardiopulmonary abnormality. No change.      CT-THORACIC AORTA EVALUATION   Final Result      1.  No evidence of aortic dissection. No aneurysmal dilatation. No mediastinal or retroperitoneal hematoma.   2.  Atelectasis within both lungs. No pleural effusions.   3.  Distended small bowel loops with wall edema left-sided abdomen consistent with enteritis/possibly Crohn's. Infection not excluded.   4.  Small free fluid collections within the abdomen and pelvis.   5.  Diverticulosis of the colon without evidence diverticulitis.   6.  Hepatic steatosis.   7.  Tiny left adrenal gland nodules.   8.  Question tiny calcified gallstones.         CT-CSPINE WITHOUT PLUS RECONS   No acute cervical spine fracture identified.      CT-HEAD W/O   No  evidence of acute intracranial process.            Procedures:        Central Venous Line placement    Hospital Summary (Brief Narrative):         In brief, Satya Aguilar is a very pleasant 61 y.o. male who was admitted 5/3/2017 for syncope with loss of consciousness. On admission patient was noted to have acute renal injury from dehydration, as well as cardiogenic component, as a result patient was admitted to the ICU, central line was placed, pressors were started. Initial diagnosis including cardiogenic shock and sepsis due to possibly acute colitis. Hence antibiotics were initiated as well. Patient condition was stabilized. However, patient had new onset of atrial fibrillation. He was started on amiodarone and converted to sinus rhythm. However given paroxysmal recurrence, amiodarone gtt was started with loading dose. His heart rate was stabilized with assistance from cardiology. In addition xarelto was started for anticoagulation given his chadsvasc score. currently the patent is doing well, is ambulatory without issues. Patient denies fevers/chills, chest pain, shortness of breath or nausea/vommiting.       For H and P on admission, please refer to full H and P dictated by Dr. Curtis HARRISON      Disposition:   Discharge home    Condition:  Stable    Activity:   As tolerated     Diet:   Diabetic Diet    Discharge Medications:           Medication List      START taking these medications       Instructions    amiodarone 400 MG tablet   Last time this was given:  400 mg on 5/8/2017  8:22 AM   Commonly known as:  PACERONE    Take 1 Tab by mouth 2 Times a Day.   Dose:  400 mg       cephALEXin 250 MG Caps   Commonly known as:  KEFLEX    Take 1 Cap by mouth 4 times a day for 3 days.   Dose:  250 mg       metoprolol 25 MG Tabs   Last time this was given:  25 mg on 5/8/2017  8:23 AM   Commonly known as:  LOPRESSOR    Take 1 Tab by mouth 2 Times a Day.   Dose:  25 mg       rivaroxaban 20 MG Tabs tablet   Commonly known as:   XARELTO    Take 1 Tab by mouth with dinner.   Dose:  20 mg         CONTINUE taking these medications       Instructions    albuterol 108 (90 BASE) MCG/ACT Aers inhalation aerosol   Last time this was given:  2 Puffs on 5/7/2017  5:02 AM    Inhale 2 Puffs by mouth every 1 hour as needed.   Dose:  2 Puff       aspirin 81 MG tablet    Take 81 mg by mouth every day.   Dose:  81 mg       citalopram 10 MG tablet   Last time this was given:  10 mg on 5/8/2017  8:22 AM   Commonly known as:  CELEXA    Take 10 mg by mouth every day.   Dose:  10 mg       * gabapentin 400 MG Caps   Last time this was given:  400 mg on 5/8/2017  8:23 AM   Commonly known as:  NEURONTIN    Take 400 mg by mouth 3 times a day.   Dose:  400 mg       * gabapentin 300 MG Caps   Last time this was given:  400 mg on 5/8/2017  8:23 AM   Commonly known as:  NEURONTIN    Take 1 Cap by mouth 3 times a day.   Dose:  300 mg       gemfibrozil 600 MG Tabs   Commonly known as:  LOPID    Take 600 mg by mouth 2 times a day.   Dose:  600 mg       glyBURIDE 2.5 MG Tabs   Commonly known as:  DIABETA    Take 2.5 mg by mouth every morning with breakfast.   Dose:  2.5 mg       lovastatin 40 MG tablet   Last time this was given:  40 mg on 5/7/2017  9:31 PM   Commonly known as:  MEVACOR    Take 40 mg by mouth every evening.   Dose:  40 mg       metformin 1000 MG tablet   Commonly known as:  GLUCOPHAGE    Take 1,000 mg by mouth 2 times a day, with meals.   Dose:  1000 mg       * risperidone 3 MG Tabs   Last time this was given:  3 mg on 5/7/2017 10:43 PM   Commonly known as:  RISPERDAL    Take 3 mg by mouth every evening.   Dose:  3 mg       * risperidone 1 MG Tabs   Last time this was given:  3 mg on 5/7/2017 10:43 PM   Commonly known as:  RISPERDAL    Take 1 mg by mouth every bedtime.   Dose:  1 mg       tiotropium 18 MCG Caps   Last time this was given:  1 Cap on 5/8/2017  8:23 AM   Commonly known as:  SPIRIVA    Inhale 1 Cap by mouth every day.   Dose:  18 mcg       *  Notice:  This list has 4 medication(s) that are the same as other medications prescribed for you. Read the directions carefully, and ask your doctor or other care provider to review them with you.            Follow up appointment details :      I encouraged him to call his PCP to confirm follow up after discharge.    Future Appointments  Date Time Provider Department Center   5/31/2017 2:40 PM ABBIE Liu [facility] None         Instructions:      The were given instructions to return to the ER if patient's condition worsens      Time Spent on Discharge:     Discharge instructions were discussed with the patient at bedside. Patient  expressed understanding and agreed to comply with all discharge instructions.    38  minutes were spent in the discharge planning and management of this  patient, including more than 50% of the time spent face to face in   Counseling.

## 2017-05-08 NOTE — DISCHARGE PLANNING
Pt's Xarelto is $50.00. Pt reports he can afford this. SW requested pharmacy fill the medication.

## 2017-05-08 NOTE — PROGRESS NOTES
Pulmonary Critical Care Progress Note        DOS:  5/7/2017    Chief Complaint:  Syncope    History of Present Illness:   The patient is a very pleasant 61-year-old male with a past medical history significant for diabetes as well as asthma and hypertension who was brought to the emergency department by emergency medical services after having a syncopal episode earlier on the day of admission.  The patient reports that he stood up and felt lightheaded and then fell hitting    his head.  He also complained of mild neck pain after the fainting spell.   Throughout the day, the patient has been having worsening diffuse generalized abdominal pain as well as nausea.  He denies any vomiting, diarrhea, chills, chest pain or shortness of breath prior to his syncopal episodes.  Denies a history of similar symptoms.  Patient denies any recent sick contacts or diarrheal illnesses.    Patient was originally admitted to the telemetry floor for acute kidney injury along with mild hypotension and what seems to be acute colitis.  However, after 4 liters of IV fluids, the patient still had a systolic blood pressure in the 70s requiring a rapid response and subsequent transfer to the ICU for continued management.  In the ICU, central line was placed and pressors were started as well as the sepsis protocol.      ROS:  Respiratory: positive cough, negative sputum production and positive wheezing  Cardiac: negative chest pain and negative palpitations  GI: positive nausea, negative vomiting, positive abdominal pain and negative diarrhea.        Interval Events:  24 hour interval history reviewed     Alert and participating  Complains of spasms to right leg and tremors to upper extremities intermittently - sounds radicular?  Not resolved with pain meds.  SR, with A fib at 2 am while sleeping.  Amio gtts with conversion to SR this AM. BP noted   US yesterday of distended abdomen was negative  Tmax 101.4.  WBC markedly improved.   ,  amio off now.   Pressors off since yesterday.   incentive spirometry 3643-1870 mL  CXR with clear lung fields.     5/7 doing ok, not in pain or SOB    PFSH:  No change.      Respiratory:     Pulse Oximetry: 94 % on room air.   Exam: WOB is minimal on room air. Clear with inspiration with few scattered wheezes on expiration.   ImagingAvailable data reviewed   CXR reviewed with team.   incentive spirometry 4606-5626 mL        Invalid input(s): [id number]    HemoDynamics:  Pulse: 71  Blood Pressure: 130/67 mmHg    Exam: SR, RRR, no murmur. No edema. Extremities are warm, pink and perfusing.  Imaging: Available data reviewed     Recent Labs      05/05/17   1420   TROPONINI  <0.01       Neuro:  GCS    Exam: A&O X4, Conversing. NFE.   No pain right hip or thigh to palpation or stressing muscles.   No spine tenderness.   Imaging: Available data reviewed      Fluids:  Intake/Output       05/05/17 0700 - 05/06/17 0659 05/06/17 0700 - 05/07/17 0659 05/07/17 0700 - 05/08/17 0659      7461-5595 9429-5321 Total 2881-0308 6018-6624 Total 5278-1954 3231-8826 Total       Intake    P.O.  50  -- 50  --  -- --  240  -- 240    P.O. 50 -- 50 -- -- -- 240 -- 240    I.V.  1693  -- 1693  --  -- --  --  -- --    Magnesium Sulfate Volume 100 -- 100 -- -- -- -- -- --    Amiodarone 100 -- 100 -- -- -- -- -- --    IV Volume (NS TKO) 55 -- 55 -- -- -- -- -- --    IV Volume (NS MIVF) 1238 -- 1238 -- -- -- -- -- --    IV Piggyback Volume (IV Piggyback) 200 -- 200 -- -- -- -- -- --    Total Intake 1743 -- 1743 -- -- -- 240 -- 240       Output    Urine  1600  -- 1600  400  -- 400  1350  300 1650    Number of Times Voided 2 x 3 x 5 x -- -- -- -- -- --    Indwelling Cathether 1200 -- 1200 -- -- -- -- -- --    Void (ml) 400 -- 400 400 -- 400 2871 088 3862    Stool  --  -- --  --  -- --  --  -- --    Number of Times Stooled 1 x 1 x 2 x 1 x 1 x 2 x -- -- --    Total Output 1600 -- 1600 400 -- 400 5885 295 2900       Net I/O     143 -- 143 -400 --  -400 -1110 -300 -1410        Weight: 98.6 kg (217 lb 6 oz)  Recent Labs      17   SODIUM  135  139  140   POTASSIUM  4.6  4.5  4.4   CHLORIDE  105  106  106   CO2  25  30  27   BUN  11  11  9   CREATININE  1.00  1.06  0.84   MAGNESIUM  1.6  1.7  1.5   PHOSPHORUS  2.8  2.6  2.9   CALCIUM  8.6  9.0  8.8       GI/Nutrition:  Exam:  Mild tenderness to the left lower abdomen and over bladder. Hyperactive bowel sounds. No CVA tenderness.   Imaging: Available data reviewed  taking PO       Liver Function  Recent Labs      17   ALTSGPT  5  <5  <5   ASTSGOT  8*  7*  7*   ALKPHOSPHAT  53  51  53   TBILIRUBIN  0.4  0.3  0.4   GLUCOSE  124*  120*  143*       Heme:  Recent Labs      17   RBC  4.21*  4.05*  4.12*   HEMOGLOBIN  11.9*  11.3*  11.4*   HEMATOCRIT  38.5*  36.3*  37.0*   PLATELETCT  252  240  232       Infectious Disease:  Temp  Av.3 °C (97.4 °F)  Min: 36.1 °C (97 °F)  Max: 36.7 °C (98 °F)  Micro: reviewed   Exam: small 1 cm diabetic ulcers on the 1st, 2nd and 4th toes and under the 3rd and 4th toes of the right foot that do not appear to be infected. No diabetic ulcers to the left foot.     Recent Labs      17   WBC  12.8*  12.8*  11.2*   NEUTSPOLYS  70.70  72.60*  68.60   LYMPHOCYTES  17.20*  15.00*  16.90*   MONOCYTES  5.10  4.70  4.60   EOSINOPHILS  6.00  6.70  8.70*   BASOPHILS  0.50  0.50  0.90   ASTSGOT  8*  7*  7*   ALTSGPT  5  <5  <5   ALKPHOSPHAT  53  51  53   TBILIRUBIN  0.4  0.3  0.4     Current Facility-Administered Medications   Medication Dose Frequency Provider Last Rate Last Dose   • amiodarone (CORDARONE) tablet 400 mg  400 mg TWICE DAILY Loyd Hou M.D.   400 mg at 17   • metoprolol (LOPRESSOR) tablet 25 mg  25 mg TWICE DAILY Dev Cardenas M.D.   25 mg at 17   •  piperacillin-tazobactam (ZOSYN) 3.375 g in  mL IVPB  3.375 g Q6HRS Dev Cardenas M.D. 200 mL/hr at 05/07/17 2128 3.375 g at 05/07/17 2128   • enoxaparin (LOVENOX) inj 100 mg  100 mg Q12HRS Dev Cardenas M.D.   100 mg at 05/07/17 2131   • NS infusion   Continuous Dev Cardenas M.D. 75 mL/hr at 05/07/17 1700     • Respiratory Care per Protocol   Continuous RT Dev Cardenas M.D.       • albuterol (PROVENTIL) 2.5mg/0.5ml nebulizer solution 2.5 mg  2.5 mg Q4H PRN (RT) MARIANN McphersonOKyler   2.5 mg at 05/07/17 1125   • insulin lispro (HUMALOG) injection 1-6 Units  1-6 Units 4X/DAY DANNY Eaton M.D.   Stopped at 05/06/17 2100   • albuterol inhaler 2 Puff  2 Puff Q HOUR PRN MARIANN McphersonO.   2 Puff at 05/07/17 0502   • citalopram (CELEXA) tablet 10 mg  10 mg DAILY KRZYSZTOF Mcpherson.O.   10 mg at 05/07/17 0810   • gabapentin (NEURONTIN) capsule 400 mg  400 mg TID KRZYSZTOF Mcpherson.O.   400 mg at 05/07/17 2100   • lovastatin (MEVACOR) tablet 40 mg  40 mg Nightly KRZYSZTOF Mcpherson.O.   40 mg at 05/07/17 2131   • risperidone (RISPERDAL) tablet 3 mg  3 mg Q EVENING KRZYSZTOF Mcpherson.O.   3 mg at 05/06/17 2137   • tiotropium (SPIRIVA) 18 MCG inhalation capsule 1 Cap  1 Cap DAILY KRZYSZTOF Mcpherson.O.   1 Cap at 05/07/17 0810   • NS infusion 2,763 mL  30 mL/kg Once PRN KRZYSZTOF Mcpherson.O.       • senna-docusate (PERICOLACE or SENOKOT S) 8.6-50 MG per tablet 2 Tab  2 Tab BID Kiara Acevedo D.O.   1 Tab at 05/07/17 2132    And   • polyethylene glycol/lytes (MIRALAX) PACKET 1 Packet  1 Packet QDAY PRN Kiara Acevedo D.O.        And   • magnesium hydroxide (MILK OF MAGNESIA) suspension 30 mL  30 mL QDAY PRN Kiara Acevedo D.O.        And   • bisacodyl (DULCOLAX) suppository 10 mg  10 mg QDAY PRN Kiara Acevedo D.O.       • glucose 4 g chewable tablet 16 g  16 g Q15 MIN PRN Kiara Acevedo D.O.        And   • dextrose 50% (D50W) injection 25 mL  25 mL Q15 MIN  PRN Kiara Acevedo D.O.       • ondansetron (ZOFRAN) syringe/vial injection 4 mg  4 mg Q4HRS PRN KRZYSZTOF Mcpherson.O.   4 mg at 05/04/17 1113   • ondansetron (ZOFRAN ODT) dispertab 4 mg  4 mg Q4HRS PRN KRZYSZTOF Mcpherson.O.       • promethazine (PHENERGAN) tablet 12.5-25 mg  12.5-25 mg Q4HRS PRN KRZYSZTOF Mcpherson.O.       • promethazine (PHENERGAN) suppository 12.5-25 mg  12.5-25 mg Q4HRS PRN KRZYSZTOF Mcpherson.O.       • prochlorperazine (COMPAZINE) injection 5-10 mg  5-10 mg Q4HRS PRN KRZYSZTOF Mcpherson.O.       • oxycodone immediate-release (ROXICODONE) tablet 5 mg  5 mg Q3HRS PRN Kiara Acevedo D.O.        And   • oxycodone immediate release (ROXICODONE) tablet 10 mg  10 mg Q3HRS PRN KRZYSZTOF Mcpherson.O.   10 mg at 05/04/17 1542    And   • morphine (pf) 4 mg/ml injection 4 mg  4 mg Q3HRS PRN KRZYSZTOF Mcpherson.O.   4 mg at 05/05/17 0226     Last reviewed on 5/3/2017  9:50 PM by Raymundo Abbott    Quality  Measures:  Labs reviewed, Radiology images reviewed and Medications reviewed  Yañez catheter: No Yañez                    Problems/plan:    1.  Severe sepsis with septic shock likely related to a gastrointestinal source.  2.  Acute kidney injury.  3.  Acute hyperkalemia.  4.  Acute small bowel enteritis.  5.  Acute leukocytosis.  6.  Acute mild lactic acidosis.  7.  Severe hypovolemia.  8.  Acute hypocalcemia.  9.  History of systemic arterial hypertension.  10.  History of type 2 diabetes.  11.  History of asthma.  12.  History of obstructive sleep apnea.  13.  History of marijuana dependence.    IV ABX  Await final cultures - de-escalate as appropriate  Replete Mg - 4 grams  RT/O2 protocols - DuoNeb  Watch for need for steroids for Bspasm  Resume home emds  IS/mobilize  Stop amiodarone drip  Query work up right low radicular back pain.   SSI - glycemic control  Ok to tele later today if stable  Prophylaxis    Stable from pulmonary stand point, pulmonary will sign off, please call for any  questions

## 2017-05-08 NOTE — PROGRESS NOTES
"Cardiology Progress Note               Author: Marilyn Cabezas Date & Time created: 2017  10:36 AM     Interval History:  Consultation for new JULIET parker with [facility] on 17    Admitted with syncope, complain of abdominal pain ×2 days, concern for sepsis of GI origin    History of COPD, diabetes2, hyperlipidemia, anxiety, echo on  showed preserved EF no significant valve disease, MPI normally 2016, asthma, obstructive sleep apnea, marijuana dependence    Labs reviewed  Leukocytosis improving (25---> 11)  Sodium, potassium, creatinine stable  Troponin negative  TSH normal    BP = 110/70  HR = 50-90 in and out of aFib/flutter, asymptomatic     Review of Systems   Constitutional:        \" I am ready to go home \"   Respiratory: Negative for cough and shortness of breath.    Cardiovascular: Negative for chest pain, palpitations, orthopnea, claudication and PND.       Physical Exam   Constitutional: He is oriented to person, place, and time.   HENT:   Head: Normocephalic.   Neck: No JVD present. No thyromegaly present.   Cardiovascular: Normal rate and regular rhythm.    Pulses:       Carotid pulses are 2+ on the right side, and 2+ on the left side.       Radial pulses are 2+ on the right side, and 2+ on the left side.        Posterior tibial pulses are 2+ on the right side, and 2+ on the left side.   Pulmonary/Chest: He has no wheezes.   Abdominal: Soft.   Musculoskeletal: He exhibits no edema.   Neurological: He is alert and oriented to person, place, and time.   Skin: Skin is warm and dry.       Hemodynamics:  Temp (24hrs), Av.4 °C (97.5 °F), Min:36.2 °C (97.1 °F), Max:36.7 °C (98 °F)  Temperature: 36.3 °C (97.3 °F)  Pulse  Av.3  Min: 51  Max: 129  Blood Pressure: 110/66 mmHg    Respiratory:    Respiration: 16, Pulse Oximetry: 95 %, O2 Daily Delivery Respiratory : Silicone Nasal Cannula     Given By:: Mouthpiece, Work Of Breathing / Effort: Mild  RUL Breath Sounds: Diminished, RML Breath Sounds: Diminished, " RLL Breath Sounds: Diminished, FERMIN Breath Sounds: Diminished, LLL Breath Sounds: Diminished  Fluids:  Date 05/07/17 0700 - 05/08/17 0659   Shift 3190-4060 7694-5212 5756-0694 24 Hour Total   I  N  T  A  K  E   Shift Total       O  U  T  P  U  T   Urine 400   400    Shift Total 400   400   Weight (kg) 96.9 96.9 96.9 96.9       Weight: 98.6 kg (217 lb 6 oz)  GI/Nutrition:  Orders Placed This Encounter   Procedures   • DIET ORDER     Standing Status: Standing      Number of Occurrences: 1      Standing Expiration Date:      Order Specific Question:  Diet:     Answer:  Diabetic [3]     Order Specific Question:  Texture/Fiber modifications:     Answer:  Dysphagia 3(Mechanical Soft)specify fluid consistency(question 6) [3]     Order Specific Question:  Miscellaneous modifications:     Answer:  No Caffeine [12]     Lab Results:  Recent Labs      05/06/17 0522 05/07/17 0452  05/08/17   0448   WBC  12.8*  11.2*  10.9*   RBC  4.05*  4.12*  3.93*   HEMOGLOBIN  11.3*  11.4*  11.0*   HEMATOCRIT  36.3*  37.0*  34.9*   MCV  89.6  89.8  88.8   MCH  27.9  27.7  28.0   MCHC  31.1*  30.8*  31.5*   RDW  40.3  41.0  39.6   PLATELETCT  240  232  242   MPV  10.6  10.4  10.6     Recent Labs      05/06/17 0522 05/07/17   0452  05/08/17   0448   SODIUM  139  140  139   POTASSIUM  4.5  4.4  4.2   CHLORIDE  106  106  103   CO2  30  27  30   GLUCOSE  120*  143*  123*   BUN  11  9  10   CREATININE  1.06  0.84  0.86   CALCIUM  9.0  8.8  8.8             Recent Labs      05/05/17   1420   TROPONINI  <0.01             Medical Decision Making, by Problem:  Active Hospital Problems    Diagnosis   • *Syncope [R55]   • Atrial fibrillation (CMS-HCC) [I48.91]   • Paroxysmal atrial fibrillation (CMS-HCC) [I48.0]   • DM2 (diabetes mellitus, type 2) (CMS-HCC) [E11.9]   • Right foot ulcer (CMS-HCC) [L97.519]   • Hyperkalemia [E87.5]   • Leukocytosis [D72.829]   • Lactic acidosis [E87.2]   • Enteritis, question Crohn's on imaging [K52.9]   • Sepsis  (CMS-HCC) [A41.9]   • Volume depletion [E86.9]   • COPD (chronic obstructive pulmonary disease) (CMS-HCC) [J44.9]   • DM type 2 (diabetes mellitus, type 2) (CMS-HCC) [E11.9]   • Hypotension (arterial) [I95.9]   • Acute kidney injury (nontraumatic) (CMS-HCC) [N17.9]       Plan:     IN & out of AFib/flutter, asymptomatic   Rate nicely controlled when in afib, SB ( 50's ) when in regular rhythm     Consultation for new PAF ( 5/4/17 ) on Amiodarone 400 mg PO ( total of 2.5 gm )    On metoprolol 25 BID    On therapeutic Lovenox    Awaiting affordability on ELiquis or Xarelto    D/W case management     Presented with syncope (likely secondary from sepsis and dehydration), no recurrence     Preserved EF 65% (2/2017)    will D/W DR Eaton      Likely home today    Appointment with Richard Daugherty on 5/31/17  Labs reviewed and Medications reviewed

## 2017-05-09 ENCOUNTER — PATIENT OUTREACH (OUTPATIENT)
Dept: HEALTH INFORMATION MANAGEMENT | Facility: OTHER | Age: 62
End: 2017-05-09

## 2017-05-09 NOTE — PROGRESS NOTES
Pt dc'd to home. IV and monitor removed; monitor room notified. Pt left unit via wheelchair with pt transport. Personal belongings with pt when leaving unit. Pt given discharge instructions prior to leaving unit including prescriptions and when to visit with physician; verbalizes understanding. Copy of discharge instructions with pt and in the chart.

## 2017-05-09 NOTE — PROGRESS NOTES
05/09/2017 1337 - Discharge Outreach attempt - VMB full  05/09/2017 1523 - Discharge Outreach attempt - VMB full  05/11/2017 1548 - Discharge Outreach - reached patient and call completed.

## 2017-05-22 ENCOUNTER — HOSPITAL ENCOUNTER (EMERGENCY)
Facility: MEDICAL CENTER | Age: 62
End: 2017-05-23
Attending: EMERGENCY MEDICINE
Payer: COMMERCIAL

## 2017-05-22 ENCOUNTER — APPOINTMENT (OUTPATIENT)
Dept: RADIOLOGY | Facility: MEDICAL CENTER | Age: 62
End: 2017-05-22
Attending: EMERGENCY MEDICINE
Payer: COMMERCIAL

## 2017-05-22 DIAGNOSIS — R42 DIZZINESS: ICD-10-CM

## 2017-05-22 DIAGNOSIS — R53.1 WEAKNESS: ICD-10-CM

## 2017-05-22 DIAGNOSIS — R25.1 TREMOR: ICD-10-CM

## 2017-05-22 LAB
ALBUMIN SERPL BCP-MCNC: 4.2 G/DL (ref 3.2–4.9)
ALBUMIN/GLOB SERPL: 1.2 G/DL
ALP SERPL-CCNC: 78 U/L (ref 30–99)
ALT SERPL-CCNC: 7 U/L (ref 2–50)
ANION GAP SERPL CALC-SCNC: 10 MMOL/L (ref 0–11.9)
APPEARANCE UR: CLEAR
APTT PPP: 31.5 SEC (ref 24.7–36)
AST SERPL-CCNC: 10 U/L (ref 12–45)
BASOPHILS # BLD AUTO: 0.9 % (ref 0–1.8)
BASOPHILS # BLD: 0.08 K/UL (ref 0–0.12)
BILIRUB SERPL-MCNC: 0.5 MG/DL (ref 0.1–1.5)
BILIRUB UR QL STRIP.AUTO: NEGATIVE
BNP SERPL-MCNC: <2 PG/ML (ref 0–100)
BUN SERPL-MCNC: 23 MG/DL (ref 8–22)
CALCIUM SERPL-MCNC: 9.5 MG/DL (ref 8.5–10.5)
CHLORIDE SERPL-SCNC: 108 MMOL/L (ref 96–112)
CO2 SERPL-SCNC: 20 MMOL/L (ref 20–33)
COLOR UR: NORMAL
CREAT SERPL-MCNC: 1.21 MG/DL (ref 0.5–1.4)
CULTURE IF INDICATED INDCX: NO UA CULTURE
EOSINOPHIL # BLD AUTO: 0.32 K/UL (ref 0–0.51)
EOSINOPHIL NFR BLD: 3.8 % (ref 0–6.9)
ERYTHROCYTE [DISTWIDTH] IN BLOOD BY AUTOMATED COUNT: 42 FL (ref 35.9–50)
GFR SERPL CREATININE-BSD FRML MDRD: >60 ML/MIN/1.73 M 2
GLOBULIN SER CALC-MCNC: 3.4 G/DL (ref 1.9–3.5)
GLUCOSE SERPL-MCNC: 70 MG/DL (ref 65–99)
GLUCOSE UR STRIP.AUTO-MCNC: NEGATIVE MG/DL
HCT VFR BLD AUTO: 40.3 % (ref 42–52)
HGB BLD-MCNC: 12.8 G/DL (ref 14–18)
IMM GRANULOCYTES # BLD AUTO: 0.03 K/UL (ref 0–0.11)
IMM GRANULOCYTES NFR BLD AUTO: 0.4 % (ref 0–0.9)
INR PPP: 1.27 (ref 0.87–1.13)
KETONES UR STRIP.AUTO-MCNC: NEGATIVE MG/DL
LEUKOCYTE ESTERASE UR QL STRIP.AUTO: NEGATIVE
LYMPHOCYTES # BLD AUTO: 2.8 K/UL (ref 1–4.8)
LYMPHOCYTES NFR BLD: 33.1 % (ref 22–41)
MCH RBC QN AUTO: 28.4 PG (ref 27–33)
MCHC RBC AUTO-ENTMCNC: 31.8 G/DL (ref 33.7–35.3)
MCV RBC AUTO: 89.4 FL (ref 81.4–97.8)
MICRO URNS: NORMAL
MONOCYTES # BLD AUTO: 0.43 K/UL (ref 0–0.85)
MONOCYTES NFR BLD AUTO: 5.1 % (ref 0–13.4)
NEUTROPHILS # BLD AUTO: 4.8 K/UL (ref 1.82–7.42)
NEUTROPHILS NFR BLD: 56.7 % (ref 44–72)
NITRITE UR QL STRIP.AUTO: NEGATIVE
NRBC # BLD AUTO: 0 K/UL
NRBC BLD AUTO-RTO: 0 /100 WBC
PH UR STRIP.AUTO: 5 [PH]
PLATELET # BLD AUTO: 344 K/UL (ref 164–446)
PMV BLD AUTO: 11.1 FL (ref 9–12.9)
POTASSIUM SERPL-SCNC: 4.1 MMOL/L (ref 3.6–5.5)
PROT SERPL-MCNC: 7.6 G/DL (ref 6–8.2)
PROT UR QL STRIP: NEGATIVE MG/DL
PROTHROMBIN TIME: 16.3 SEC (ref 12–14.6)
RBC # BLD AUTO: 4.51 M/UL (ref 4.7–6.1)
RBC UR QL AUTO: NEGATIVE
SODIUM SERPL-SCNC: 138 MMOL/L (ref 135–145)
SP GR UR STRIP.AUTO: 1.02
TROPONIN I SERPL-MCNC: <0.01 NG/ML (ref 0–0.04)
WBC # BLD AUTO: 8.5 K/UL (ref 4.8–10.8)

## 2017-05-22 PROCEDURE — 700105 HCHG RX REV CODE 258: Performed by: EMERGENCY MEDICINE

## 2017-05-22 PROCEDURE — 304562 HCHG STAT O2 MASK/CANNULA

## 2017-05-22 PROCEDURE — 80053 COMPREHEN METABOLIC PANEL: CPT

## 2017-05-22 PROCEDURE — 304561 HCHG STAT O2

## 2017-05-22 PROCEDURE — 85025 COMPLETE CBC W/AUTO DIFF WBC: CPT

## 2017-05-22 PROCEDURE — 71010 DX-CHEST-PORTABLE (1 VIEW): CPT

## 2017-05-22 PROCEDURE — 84484 ASSAY OF TROPONIN QUANT: CPT

## 2017-05-22 PROCEDURE — 81003 URINALYSIS AUTO W/O SCOPE: CPT

## 2017-05-22 PROCEDURE — 85610 PROTHROMBIN TIME: CPT

## 2017-05-22 PROCEDURE — 82553 CREATINE MB FRACTION: CPT

## 2017-05-22 PROCEDURE — 93005 ELECTROCARDIOGRAM TRACING: CPT

## 2017-05-22 PROCEDURE — 85730 THROMBOPLASTIN TIME PARTIAL: CPT

## 2017-05-22 PROCEDURE — 83880 ASSAY OF NATRIURETIC PEPTIDE: CPT

## 2017-05-22 PROCEDURE — 99284 EMERGENCY DEPT VISIT MOD MDM: CPT

## 2017-05-22 PROCEDURE — 87040 BLOOD CULTURE FOR BACTERIA: CPT | Mod: 91

## 2017-05-22 RX ORDER — SODIUM CHLORIDE 9 MG/ML
1000 INJECTION, SOLUTION INTRAVENOUS ONCE
Status: COMPLETED | OUTPATIENT
Start: 2017-05-22 | End: 2017-05-23

## 2017-05-22 RX ADMIN — SODIUM CHLORIDE 1000 ML: 9 INJECTION, SOLUTION INTRAVENOUS at 22:01

## 2017-05-22 NOTE — ED AVS SNAPSHOT
[employer] Access Code: [email or web address]  Expires: 5/29/2017 10:54 AM    Your email address is not on file at [facility].  Email Addresses are required for you to sign up for [employer], please contact [phone] to verify your personal information and to provide your email address prior to attempting to register for [employer].    Satya Aguilar  [facility] [address]    [employer]  A secure, online tool to manage your health information     [facility]’s [employer]® is a secure, online tool that connects you to your personalized health information from the privacy of your home -- day or night - making it very easy for you to manage your healthcare. Once the activation process is completed, you can even access your medical information using the [employer] janice, which is available for free in the Apple Janice store or Google Play store.     To learn more about [employer], visit [email or web address]    There are two levels of access available (as shown below):   My Chart Features  [facility] Primary Care Doctor [facility]  Specialists [facility]  Urgent  Care Non-[facility] Primary Care Doctor   Email your healthcare team securely and privately 24/7 X X X    Manage appointments: schedule your next appointment; view details of past/upcoming appointments X      Request prescription refills. X      View recent personal medical records, including lab and immunizations X X X X   View health record, including health history, allergies, medications X X X X   Read reports about your outpatient visits, procedures, consult and ER notes X X X X   See your discharge summary, which is a recap of your hospital and/or ER visit that includes your diagnosis, lab results, and care plan X X  X     How to register for AG[employer]Pt:  Once your e-mail address has been verified, follow the following steps to sign up for AG[employer]Pt.     1. Go to  [email or web address]  2. Click on the Sign Up Now box, which takes you to the New Member Sign Up  page. You will need to provide the following information:  a. Enter your [employer] Access Code exactly as it appears at the top of this page. (You will not need to use this code after you’ve completed the sign-up process. If you do not sign up before the expiration date, you must request a new code.)   b. Enter your date of birth.   c. Enter your home email address.   d. Click Submit, and follow the next screen’s instructions.  3. Create a [employer] ID. This will be your [employer] login ID and cannot be changed, so think of one that is secure and easy to remember.  4. Create a [employer] password. You can change your password at any time.  5. Enter your Password Reset Question and Answer. This can be used at a later time if you forget your password.   6. Enter your e-mail address. This allows you to receive e-mail notifications when new information is available in [employer].  7. Click Sign Up. You can now view your health information.    For assistance activating your [employer] account, call [phone]

## 2017-05-22 NOTE — ED NOTES
"Satya Aguilar 61 y.o. male   Chief Complaint   Patient presents with   • Dizziness     x2 weeks.  Recent admit for hypotension/Non STEMI.  Reports dizzy since.        Worse after getting up.  Generalized weakness.  Pt reports \"I haven't had the strength since I got out of the hospital.  And my hand keeps shaking.\"  Tremor noted to R hand.    To EKG.  Pt returned to lobby and educated on triage process.  Advised to notify RN with changes or concerns.    "

## 2017-05-22 NOTE — ED AVS SNAPSHOT
Home Care Instructions                                                                                                                Satya Aguilar   MRN: [id number]    Department:  [facility], Emergency Dept   Date of Visit:  5/22/2017            [facility], Emergency Dept    [address] [facility] [address]    Phone:  [phone]      You were seen by     Sreedhar Guerin M.D.      Your Diagnosis Was     Dizziness     R42       These are the medications you received during your hospitalization from 05/22/2017 1649 to 05/23/2017 0015     Date/Time Order Dose Route Action    05/22/2017 2201 NS infusion 1,000 mL 1,000 mL Intravenous New Bag      Follow-up Information     1. Schedule an appointment as soon as possible for a visit with Mohsen Tamasaby, M.D..    Specialty:  Family Medicine    Contact information    [address] [address] [phone]        Medication Information     Review all of your home medications and newly ordered medications with your primary doctor and/or pharmacist as soon as possible. Follow medication instructions as directed by your doctor and/or pharmacist.     Please keep your complete medication list with you and share with your physician. Update the information when medications are discontinued, doses are changed, or new medications (including over-the-counter products) are added; and carry medication information at all times in the event of emergency situations.               Medication List      ASK your doctor about these medications        Instructions    Morning Afternoon Evening Bedtime    albuterol 108 (90 BASE) MCG/ACT Aers inhalation aerosol        Inhale 2 Puffs by mouth every 1 hour as needed.   Dose:  2 Puff                        amiodarone 400 MG tablet   Commonly known as:  PACERONE        Take 1 Tab by mouth 2 Times a Day.   Dose:  400 mg                        aspirin 81 MG tablet        Take 81 mg by mouth  every day.   Dose:  81 mg                        citalopram 10 MG tablet   Commonly known as:  CELEXA        Take 10 mg by mouth every day.   Dose:  10 mg                        gabapentin 400 MG Caps   Commonly known as:  NEURONTIN        Take 400 mg by mouth 3 times a day.   Dose:  400 mg                        gemfibrozil 600 MG Tabs   Commonly known as:  LOPID        Take 600 mg by mouth 2 times a day.   Dose:  600 mg                        glyBURIDE 2.5 MG Tabs   Commonly known as:  DIABETA        Take 2.5 mg by mouth every morning with breakfast.   Dose:  2.5 mg                        lovastatin 40 MG tablet   Commonly known as:  MEVACOR        Take 40 mg by mouth every evening.   Dose:  40 mg                        metformin 1000 MG tablet   Commonly known as:  GLUCOPHAGE        Take 1,000 mg by mouth 2 times a day, with meals.   Dose:  1000 mg                        metoprolol 25 MG Tabs   Commonly known as:  LOPRESSOR        Take 1 Tab by mouth 2 Times a Day.   Dose:  25 mg                        risperidone 3 MG Tabs   Commonly known as:  RISPERDAL        Take 3 mg by mouth every evening.   Dose:  3 mg                        rivaroxaban 20 MG Tabs tablet   Commonly known as:  XARELTO        Take 1 Tab by mouth with dinner.   Dose:  20 mg                        tiotropium 18 MCG Caps   Commonly known as:  SPIRIVA        Inhale 1 Cap by mouth every day.   Dose:  18 mcg                                Procedures and tests performed during your visit     Procedure/Test Number of Times Performed    APTT 1    BLOOD CULTURE 2    Btype Natriuretic Peptide 1    CBC w/ Differential 1    CKMB 1    Cardiac Monitoring 1    Complete Metabolic Panel (CMP) 1    DX-CHEST-PORTABLE (1 VIEW) 1    EKG (ER) 1    ESTIMATED GFR 1    IV Saline Lock 1    Oxygen 1    PT/INR 1    Pulse Ox 1    Troponin STAT 1    Urinalysis, culture if indicated 1        Discharge Instructions       Dizziness  Dizziness is a common problem. It is a  feeling of unsteadiness or light-headedness. You may feel like you are about to faint. Dizziness can lead to injury if you stumble or fall. Anyone can become dizzy, but dizziness is more common in older adults. This condition can be caused by a number of things, including medicines, dehydration, or illness.  HOME CARE INSTRUCTIONS  Taking these steps may help with your condition:  Eating and Drinking  · Drink enough fluid to keep your urine clear or pale yellow. This helps to keep you from becoming dehydrated. Try to drink more clear fluids, such as water.  · Do not drink alcohol.  · Limit your caffeine intake if directed by your health care provider.  · Limit your salt intake if directed by your health care provider.  Activity  · Avoid making quick movements.  · Rise slowly from chairs and steady yourself until you feel okay.  · In the morning, first sit up on the side of the bed. When you feel okay, stand slowly while you hold onto something until you know that your balance is fine.  · Move your legs often if you need to  one place for a long time. Tighten and relax your muscles in your legs while you are standing.  · Do not drive or operate heavy machinery if you feel dizzy.  · Avoid bending down if you feel dizzy. Place items in your home so that they are easy for you to reach without leaning over.  Lifestyle  · Do not use any tobacco products, including cigarettes, chewing tobacco, or electronic cigarettes. If you need help quitting, ask your health care provider.  · Try to reduce your stress level, such as with yoga or meditation. Talk with your health care provider if you need help.  General Instructions  · Watch your dizziness for any changes.  · Take medicines only as directed by your health care provider. Talk with your health care provider if you think that your dizziness is caused by a medicine that you are taking.  · Tell a friend or a family member that you are feeling dizzy. If he or she  notices any changes in your behavior, have this person call your health care provider.  · Keep all follow-up visits as directed by your health care provider. This is important.  SEEK MEDICAL CARE IF:  · Your dizziness does not go away.  · Your dizziness or light-headedness gets worse.  · You feel nauseous.  · You have reduced hearing.  · You have new symptoms.  · You are unsteady on your feet or you feel like the room is spinning.  SEEK IMMEDIATE MEDICAL CARE IF:  · You vomit or have diarrhea and are unable to eat or drink anything.  · You have problems talking, walking, swallowing, or using your arms, hands, or legs.  · You feel generally weak.  · You are not thinking clearly or you have trouble forming sentences. It may take a friend or family member to notice this.  · You have chest pain, abdominal pain, shortness of breath, or sweating.  · Your vision changes.  · You notice any bleeding.  · You have a headache.  · You have neck pain or a stiff neck.  · You have a fever.     This information is not intended to replace advice given to you by your health care provider. Make sure you discuss any questions you have with your health care provider.     Document Released: 06/13/2002 Document Revised: 05/03/2016 Document Reviewed: 12/14/2015  [employer] Interactive Patient Education ©2016 [employer]    Tremor  A tremor is trembling or shaking that you cannot control. Most tremors affect the hands or arms. Tremors can also affect the head, vocal cords, face, and other parts of the body. There are many types of tremors. Common types include:   · Essential tremor. These usually occur in people over the age of 40. It may run in families and can happen in otherwise healthy people.    · Resting tremor. These occur when the muscles are at rest, such as when your hands are resting in your lap. People with Parkinson disease often have resting tremors.    · Postural tremor. These occur when you try to hold a pose, such as keeping  your hands outstretched.    · Kinetic tremor. These occur during purposeful movement, such as trying to touch a finger to your nose.    · Task-specific tremor. These may occur when you perform tasks such as handwriting, speaking, or standing.    · Psychogenic tremor. These dramatically lessen or disappear when you are distracted. They can happen in people of all ages.    Some types of tremors have no known cause. Tremors can also be a symptom of nervous system problems (neurological disorders) that may occur with aging. Some tremors go away with treatment while others do not.   HOME CARE INSTRUCTIONS  Watch your tremor for any changes. The following actions may help to lessen any discomfort you are feeling:  · Take medicines only as directed by your health care provider.    · Limit alcohol intake to no more than 1 drink per day for nonpregnant women and 2 drinks per day for men. One drink equals 12 oz of beer, 5 oz of wine, or 1½ oz of hard liquor.  · Do not use any tobacco products, including cigarettes, chewing tobacco, or electronic cigarettes. If you need help quitting, ask your health care provider.    · Avoid extreme heat or cold.     · Limit the amount of caffeine you consume as directed by your health care provider.    · Try to get 8 hours of sleep each night.  · Find ways to manage your stress, such as meditation or yoga.  · Keep all follow-up visits as directed by your health care provider. This is important.  SEEK MEDICAL CARE IF:  · You start having a tremor after starting a new medicine.  · You have tremor with other symptoms such as:  · Numbness.  · Tingling.  · Pain.  · Weakness.  · Your tremor gets worse.  · Your tremor interferes with your day-to-day life.     This information is not intended to replace advice given to you by your health care provider. Make sure you discuss any questions you have with your health care provider.     Document Released: 12/08/2003 Document Revised: 01/08/2016 Document  Reviewed: 06/15/2015  [employer] Interactive Patient Education ©2016 [employer]    Weakness  Weakness is a lack of strength. You may feel weak all over your body or just in one part of your body. Weakness can be serious. In some cases, you may need more medical tests.  HOME CARE  · Rest.  · Eat a well-balanced diet.  · Try to exercise every day.  · Only take medicines as told by your doctor.  GET HELP RIGHT AWAY IF:   · You cannot do your normal daily activities.  · You cannot walk up and down stairs, or you feel very tired when you do so.  · You have shortness of breath or chest pain.  · You have trouble moving parts of your body.  · You have weakness in only one body part or on only one side of the body.  · You have a fever.  · You have trouble speaking or swallowing.  · You cannot control when you pee (urinate) or poop (bowel movement).  · You have black or bloody throw up (vomit) or poop.  · Your weakness gets worse or spreads to other body parts.  · You have new aches or pains.  MAKE SURE YOU:   · Understand these instructions.  · Will watch your condition.  · Will get help right away if you are not doing well or get worse.     This information is not intended to replace advice given to you by your health care provider. Make sure you discuss any questions you have with your health care provider.     Document Released: 11/30/2009 Document Revised: 06/18/2013 Document Reviewed: 02/15/2013  [employer] Patient Education ©2016 [employer]            Patient Information     Patient Information    Following emergency treatment: all patient requiring follow-up care must return either to a private physician or a clinic if your condition worsens before you are able to obtain further medical attention, please return to the emergency room.     Billing Information    At [facility], we work to make the billing process streamlined for our patients.  Our Representatives are here to answer any questions you may  have regarding your hospital bill.  If you have insurance coverage and have supplied your insurance information to us, we will submit a claim to your insurer on your behalf.  Should you have any questions regarding your bill, we can be reached online or by phone as follows:  Online: You are able pay your bills online or live chat with our representatives about any billing questions you may have. We are here to help Monday - Friday from 8:00am to 7:30pm and 9:00am - 12:00pm on Saturdays.  Please visit [email or web address]  for more information.   Phone:  [phone] or [phone]    Please note that your emergency physician, surgeon, pathologist, radiologist, anesthesiologist, and other specialists are not employed by [facility] and will therefore bill separately for their services.  Please contact them directly for any questions concerning their bills at the numbers below:     Emergency Physician Services:  [phone]  [facility]:  [phone]  [facility]:  [phone]  [facility]:  [phone]    1. Your final bill may vary from the amount quoted upon discharge if all procedures are not complete at that time, or if your doctor has additional procedures of which we are not aware. You will receive an additional bill if you return to the Emergency Department at [facility] for suture removal regardless of the facility of which the sutures were placed.     2. Please arrange for settlement of this account at the emergency registration.    3. All self-pay accounts are due in full at the time of treatment.  If you are unable to meet this obligation then payment is expected within 4-5 days.     4. If you have had radiology studies (CT, X-ray, Ultrasound, MRI), you have received a preliminary result during your emergency department visit. Please contact the radiology department [phone] to receive a copy of your final result.  Please discuss the Final result with your primary physician or with the follow up physician provided.     Crisis Hotline:  [place] Crisis Hotline:  1-183-HNPQCKB or 1-965.947.6708  [place] Crisis Hotline:    1-215.935.6555 or 159-776-9788         ED Discharge Follow Up Questions    1. In order to provide you with very good care, we would like to follow up with a phone call in the next few days.  May we have your permission to contact you?     YES /  NO    2. What is the best phone number to call you? (       )_____-__________    3. What is the best time to call you?      Morning  /  Afternoon  /  Evening                   Patient Signature:  ____________________________________________________________    Date:  ____________________________________________________________      Your appointments     May 31, 2017  2:40 PM   HOSPITAL FOLLOW UP with ABBIE Liu   [facility] (--)    [address], [facility] [address]  [facility] [place] [facility] [address]   [phone]

## 2017-05-22 NOTE — ED AVS SNAPSHOT
5/23/2017    Satya Aguilar  [address]   [address]  [place] [address]    Dear Satya:    Critical access hospital wants to ensure your discharge home is safe and you or your loved ones have had all of your questions answered regarding your care after you leave the hospital.    Below is a list of resources and contact information should you have any questions regarding your hospital stay, follow-up instructions, or active medical symptoms.    Questions or Concerns Regarding… Contact   Medical Questions Related to Your Discharge  (7 days a week, 8am-5pm) Contact a Nurse Care Coordinator   [phone]   Medical Questions Not Related to Your Discharge  (24 hours a day / 7 days a week)  Contact the Nurse Health Line   [phone]    Medications or Discharge Instructions Refer to your discharge packet   or contact your [facility] Primary Care Provider   [phone]   Follow-up Appointment(s) Schedule your appointment via [employer]   or contact Scheduling [phone]   Billing Review your statement via [employer]  or contact Billing [phone]   Medical Records Review your records via [employer]   or contact Medical Records [phone]     You may receive a telephone call within two days of discharge. This call is to make certain you understand your discharge instructions and have the opportunity to have any questions answered. You can also easily access your medical information, test results and upcoming appointments via the [employer] free online health management tool. You can learn more and sign up at [email or web address]. For assistance setting up your [employer] account, please call [phone].    Once again, we want to ensure your discharge home is safe and that you have a clear understanding of any next steps in your care. If you have any questions or concerns, please do not hesitate to contact us, we are here for you. Thank you for choosing [facility] for your healthcare needs.    Sincerely,    Your [facility] Healthcare Team

## 2017-05-23 VITALS
TEMPERATURE: 97.9 F | HEART RATE: 43 BPM | OXYGEN SATURATION: 100 % | SYSTOLIC BLOOD PRESSURE: 103 MMHG | HEIGHT: 75 IN | WEIGHT: 194.89 LBS | RESPIRATION RATE: 16 BRPM | DIASTOLIC BLOOD PRESSURE: 51 MMHG | BODY MASS INDEX: 24.23 KG/M2

## 2017-05-23 LAB
CK MB SERPL-MCNC: 2.5 NG/ML (ref 0–5)
EKG IMPRESSION: NORMAL

## 2017-05-23 NOTE — DISCHARGE INSTRUCTIONS
Dizziness  Dizziness is a common problem. It is a feeling of unsteadiness or light-headedness. You may feel like you are about to faint. Dizziness can lead to injury if you stumble or fall. Anyone can become dizzy, but dizziness is more common in older adults. This condition can be caused by a number of things, including medicines, dehydration, or illness.  HOME CARE INSTRUCTIONS  Taking these steps may help with your condition:  Eating and Drinking  · Drink enough fluid to keep your urine clear or pale yellow. This helps to keep you from becoming dehydrated. Try to drink more clear fluids, such as water.  · Do not drink alcohol.  · Limit your caffeine intake if directed by your health care provider.  · Limit your salt intake if directed by your health care provider.  Activity  · Avoid making quick movements.  · Rise slowly from chairs and steady yourself until you feel okay.  · In the morning, first sit up on the side of the bed. When you feel okay, stand slowly while you hold onto something until you know that your balance is fine.  · Move your legs often if you need to  one place for a long time. Tighten and relax your muscles in your legs while you are standing.  · Do not drive or operate heavy machinery if you feel dizzy.  · Avoid bending down if you feel dizzy. Place items in your home so that they are easy for you to reach without leaning over.  Lifestyle  · Do not use any tobacco products, including cigarettes, chewing tobacco, or electronic cigarettes. If you need help quitting, ask your health care provider.  · Try to reduce your stress level, such as with yoga or meditation. Talk with your health care provider if you need help.  General Instructions  · Watch your dizziness for any changes.  · Take medicines only as directed by your health care provider. Talk with your health care provider if you think that your dizziness is caused by a medicine that you are taking.  · Tell a friend or a family  member that you are feeling dizzy. If he or she notices any changes in your behavior, have this person call your health care provider.  · Keep all follow-up visits as directed by your health care provider. This is important.  SEEK MEDICAL CARE IF:  · Your dizziness does not go away.  · Your dizziness or light-headedness gets worse.  · You feel nauseous.  · You have reduced hearing.  · You have new symptoms.  · You are unsteady on your feet or you feel like the room is spinning.  SEEK IMMEDIATE MEDICAL CARE IF:  · You vomit or have diarrhea and are unable to eat or drink anything.  · You have problems talking, walking, swallowing, or using your arms, hands, or legs.  · You feel generally weak.  · You are not thinking clearly or you have trouble forming sentences. It may take a friend or family member to notice this.  · You have chest pain, abdominal pain, shortness of breath, or sweating.  · Your vision changes.  · You notice any bleeding.  · You have a headache.  · You have neck pain or a stiff neck.  · You have a fever.     This information is not intended to replace advice given to you by your health care provider. Make sure you discuss any questions you have with your health care provider.     Document Released: 06/13/2002 Document Revised: 05/03/2016 Document Reviewed: 12/14/2015  [employer] Interactive Patient Education ©2016 [employer] Inc.    Tremor  A tremor is trembling or shaking that you cannot control. Most tremors affect the hands or arms. Tremors can also affect the head, vocal cords, face, and other parts of the body. There are many types of tremors. Common types include:   · Essential tremor. These usually occur in people over the age of 40. It may run in families and can happen in otherwise healthy people.    · Resting tremor. These occur when the muscles are at rest, such as when your hands are resting in your lap. People with Parkinson disease often have resting tremors.    · Postural tremor. These  occur when you try to hold a pose, such as keeping your hands outstretched.    · Kinetic tremor. These occur during purposeful movement, such as trying to touch a finger to your nose.    · Task-specific tremor. These may occur when you perform tasks such as handwriting, speaking, or standing.    · Psychogenic tremor. These dramatically lessen or disappear when you are distracted. They can happen in people of all ages.    Some types of tremors have no known cause. Tremors can also be a symptom of nervous system problems (neurological disorders) that may occur with aging. Some tremors go away with treatment while others do not.   HOME CARE INSTRUCTIONS  Watch your tremor for any changes. The following actions may help to lessen any discomfort you are feeling:  · Take medicines only as directed by your health care provider.    · Limit alcohol intake to no more than 1 drink per day for nonpregnant women and 2 drinks per day for men. One drink equals 12 oz of beer, 5 oz of wine, or 1½ oz of hard liquor.  · Do not use any tobacco products, including cigarettes, chewing tobacco, or electronic cigarettes. If you need help quitting, ask your health care provider.    · Avoid extreme heat or cold.     · Limit the amount of caffeine you consume as directed by your health care provider.    · Try to get 8 hours of sleep each night.  · Find ways to manage your stress, such as meditation or yoga.  · Keep all follow-up visits as directed by your health care provider. This is important.  SEEK MEDICAL CARE IF:  · You start having a tremor after starting a new medicine.  · You have tremor with other symptoms such as:  · Numbness.  · Tingling.  · Pain.  · Weakness.  · Your tremor gets worse.  · Your tremor interferes with your day-to-day life.     This information is not intended to replace advice given to you by your health care provider. Make sure you discuss any questions you have with your health care provider.     Document  Released: 12/08/2003 Document Revised: 01/08/2016 Document Reviewed: 06/15/2015  [employer] Patient Education ©2016 [employer].    Weakness  Weakness is a lack of strength. You may feel weak all over your body or just in one part of your body. Weakness can be serious. In some cases, you may need more medical tests.  HOME CARE  · Rest.  · Eat a well-balanced diet.  · Try to exercise every day.  · Only take medicines as told by your doctor.  GET HELP RIGHT AWAY IF:   · You cannot do your normal daily activities.  · You cannot walk up and down stairs, or you feel very tired when you do so.  · You have shortness of breath or chest pain.  · You have trouble moving parts of your body.  · You have weakness in only one body part or on only one side of the body.  · You have a fever.  · You have trouble speaking or swallowing.  · You cannot control when you pee (urinate) or poop (bowel movement).  · You have black or bloody throw up (vomit) or poop.  · Your weakness gets worse or spreads to other body parts.  · You have new aches or pains.  MAKE SURE YOU:   · Understand these instructions.  · Will watch your condition.  · Will get help right away if you are not doing well or get worse.     This information is not intended to replace advice given to you by your health care provider. Make sure you discuss any questions you have with your health care provider.     Document Released: 11/30/2009 Document Revised: 06/18/2013 Document Reviewed: 02/15/2013  [employer] Patient Education ©2016 [employer] Inc.

## 2017-05-23 NOTE — ED PROVIDER NOTES
"ED Provider Note    CHIEF COMPLAINT  Chief Complaint   Patient presents with   • Dizziness     x2 weeks.  Recent admit for hypotension/Non STEMI.  Reports dizzy since.         HPI  Satya Aguilar is a 61 y.o. male who presents to ED with generalized weakness and dizziness. Reports symptoms since discharge from the hospital for prior infection. Reports generalized weakness and shaking/tremor in his hands. No recent falls. No cp/sob. Also reports dizziness particularly when getting up from a seated or lying position. No headache. No vision changes. No troubles with speech. No focal numbness/weakness/tingling of extremities. Balance stable. No fevers/chills. No nausea/vomiting/diarrhea. No sick contacts.     REVIEW OF SYSTEMS  See HPI for further details. All other systems are negative.     PAST MEDICAL HISTORY   has a past medical history of Hypertension; Diabetes (CMS-[facility]); Asthma; Sleep apnea; and High cholesterol.    SOCIAL HISTORY  Social History     Social History Main Topics   • Smoking status: Never Smoker    • Smokeless tobacco: Not on file   • Alcohol Use: Yes      Comment: occ   • Drug Use: Yes     Special: Inhaled      Comment: marijuana daily   • Sexual Activity: Not on file       SURGICAL HISTORY  patient denies any surgical history    CURRENT MEDICATIONS  Home Medications     **Home medications have not yet been reviewed for this encounter**          ALLERGIES  No Known Allergies    PHYSICAL EXAM  VITAL SIGNS: /51 mmHg  Pulse 85  Temp(Src) 36.6 °C (97.9 °F)  Resp 16  Ht 1.905 m (6' 3\")  Wt 88.4 kg (194 lb 14.2 oz)  BMI 24.36 kg/m2  SpO2 95% @LEXII[361099::@   Pulse ox interpretation: I interpret this pulse ox as normal.  Constitutional: Alert in no apparent distress.  HENT: No signs of trauma, Bilateral external ears normal, Nose normal.   Eyes: Pupils are equal and reactive, Conjunctiva normal, Non-icteric.   Neck: Normal range of motion, No tenderness, Supple, No stridor. No JVD.  Lymphatic: " No lymphadenopathy noted.   Cardiovascular: Regular rate and rhythm, no murmurs.   Thorax & Lungs: Normal breath sounds, No respiratory distress, No wheezing, No chest tenderness.   Abdomen: Bowel sounds normal, Soft, No tenderness, No masses, No pulsatile masses. No peritoneal signs.  Skin: Warm, Dry, No erythema, No rash.   Back: No bony tenderness, No CVA tenderness.   Extremities: Intact distal pulses, No edema, No tenderness,   Musculoskeletal: Good range of motion in all major joints. No tenderness to palpation or major deformities noted.   Neurologic: Alert , Mild resting tremor of bilateral hands. Stops with purposeful movements. Facial movements symmetrical. Clear speech. UE/LE strength and sensation intact. Finger to nose testing normal. Heel to shin testing normal.   Psychiatric: Affect normal, Judgment normal, Mood normal.       DIAGNOSTIC STUDIES / PROCEDURES    EKG  EKG Interpretation-HR is 64 Sinus rhythm. Normal QRS. No stemi.     LABS  Results for orders placed or performed during the hospital encounter of 05/22/17   CBC w/ Differential   Result Value Ref Range    WBC 8.5 4.8 - 10.8 K/uL    RBC 4.51 (L) 4.70 - 6.10 M/uL    Hemoglobin 12.8 (L) 14.0 - 18.0 g/dL    Hematocrit 40.3 (L) 42.0 - 52.0 %    MCV 89.4 81.4 - 97.8 fL    MCH 28.4 27.0 - 33.0 pg    MCHC 31.8 (L) 33.7 - 35.3 g/dL    RDW 42.0 35.9 - 50.0 fL    Platelet Count 344 164 - 446 K/uL    MPV 11.1 9.0 - 12.9 fL    Neutrophils-Polys 56.70 44.00 - 72.00 %    Lymphocytes 33.10 22.00 - 41.00 %    Monocytes 5.10 0.00 - 13.40 %    Eosinophils 3.80 0.00 - 6.90 %    Basophils 0.90 0.00 - 1.80 %    Immature Granulocytes 0.40 0.00 - 0.90 %    Nucleated RBC 0.00 /100 WBC    Neutrophils (Absolute) 4.80 1.82 - 7.42 K/uL    Lymphs (Absolute) 2.80 1.00 - 4.80 K/uL    Monos (Absolute) 0.43 0.00 - 0.85 K/uL    Eos (Absolute) 0.32 0.00 - 0.51 K/uL    Baso (Absolute) 0.08 0.00 - 0.12 K/uL    Immature Granulocytes (abs) 0.03 0.00 - 0.11 K/uL    NRBC (Absolute)  0.00 K/uL   Complete Metabolic Panel (CMP)   Result Value Ref Range    Sodium 138 135 - 145 mmol/L    Potassium 4.1 3.6 - 5.5 mmol/L    Chloride 108 96 - 112 mmol/L    Co2 20 20 - 33 mmol/L    Anion Gap 10.0 0.0 - 11.9    Glucose 70 65 - 99 mg/dL    Bun 23 (H) 8 - 22 mg/dL    Creatinine 1.21 0.50 - 1.40 mg/dL    Calcium 9.5 8.5 - 10.5 mg/dL    AST(SGOT) 10 (L) 12 - 45 U/L    ALT(SGPT) 7 2 - 50 U/L    Alkaline Phosphatase 78 30 - 99 U/L    Total Bilirubin 0.5 0.1 - 1.5 mg/dL    Albumin 4.2 3.2 - 4.9 g/dL    Total Protein 7.6 6.0 - 8.2 g/dL    Globulin 3.4 1.9 - 3.5 g/dL    A-G Ratio 1.2 g/dL   Btype Natriuretic Peptide   Result Value Ref Range    B Natriuretic Peptide <2 0 - 100 pg/mL   Troponin STAT   Result Value Ref Range    Troponin I <0.01 0.00 - 0.04 ng/mL   PT/INR   Result Value Ref Range    PT 16.3 (H) 12.0 - 14.6 sec    INR 1.27 (H) 0.87 - 1.13   APTT   Result Value Ref Range    APTT 31.5 24.7 - 36.0 sec   Urinalysis, culture if indicated   Result Value Ref Range    Color Lt. Yellow     Character Clear     Specific Gravity 1.016 <1.035    Ph 5.0 5.0-8.0    Glucose Negative Negative mg/dL    Ketones Negative Negative mg/dL    Protein Negative Negative mg/dL    Bilirubin Negative Negative    Nitrite Negative Negative    Leukocyte Esterase Negative Negative    Occult Blood Negative Negative    Micro Urine Req see below     Culture Indicated No UA Culture   ESTIMATED GFR   Result Value Ref Range    GFR If African American >60 >60 mL/min/1.73 m 2    GFR If Non African American >60 >60 mL/min/1.73 m 2   EKG (ER)   Result Value Ref Range    Report       [facility]    Test Date:  2017  Pt Name:    BONIFACIO RESENDIZ                Department: ER  MRN:        [id number]                      Room:  Gender:     M                            Technician: [id number]  :        1955                   Requested By:ER TRIAGE PROTOCOL  Order #:    [id number]                    Reading  MD:    Measurements  Intervals                                Axis  Rate:       64                           P:          50  VA:         152                          QRS:        11  QRSD:       84                           T:          46  QT:         380  QTc:        392    Interpretive Statements  SINUS RHYTHM  Compared to ECG 05/06/2017 06:15:23  Atrial fibrillation no longer present           RADIOLOGY  DX-CHEST-PORTABLE (1 VIEW)   Final Result      1.  Minimal LEFT midlung subsegmental atelectasis.   2.  No pneumonia or pulmonary edema.   3.  Stable mild cardiomegaly.          COURSE & MEDICAL DECISION MAKING  Pertinent Labs & Imaging studies reviewed. (See chart for details)  12:10 AM  Sleeping comfortably. Upon waking reports mild tremor of both hands. Patient remains w/o focal neuro deficit. Taken off 02 and patient w/o any respiratory distress and 02 saturation 98% on my evaluation. Recommend close f/u with PCP concerning symptoms. No evidence of acute neuro or cardiac emergency at this time, however, very strict return instructions provided. Requesting something to eat prior to dc.     Overall stable in appearance. No focal neuro deficits. No reason to suspect ICH or acute CVA. Recommend f/u with his physician concerning resting tremor. ?medication side effect ?Parkinsons. Dizziness reported upon standing - suspect orthostatic etiology. Recently started on new cardiac medications. No significant hypotension in ED or HD instability. Recommend close f/u with medicine/cardiology for further evaluation. No cp/sob. No reason to suspect acute cardiac emergency. Strict return instructions provided. Ambulated in ED w/o issue. Left ED well appearing.     The patient will not drink alcohol nor drive with prescribed medications. The patient will return for worsening symptoms and is stable at the time of discharge. The patient verbalizes understanding and will comply.    FINAL IMPRESSION  1. Dizziness      2.  Weakness      3. Tremor           Electronically signed by: Sreedhar Guerin, 5/22/2017 9:10 PM

## 2017-05-27 LAB
BACTERIA BLD CULT: NORMAL
BACTERIA BLD CULT: NORMAL
SIGNIFICANT IND 70042: NORMAL
SIGNIFICANT IND 70042: NORMAL
SITE SITE: NORMAL
SITE SITE: NORMAL
SOURCE SOURCE: NORMAL
SOURCE SOURCE: NORMAL

## 2017-05-31 ENCOUNTER — OFFICE VISIT (OUTPATIENT)
Dept: CARDIOLOGY | Facility: MEDICAL CENTER | Age: 62
End: 2017-05-31
Payer: COMMERCIAL

## 2017-05-31 VITALS
OXYGEN SATURATION: 92 % | WEIGHT: 199 LBS | HEART RATE: 76 BPM | DIASTOLIC BLOOD PRESSURE: 60 MMHG | HEIGHT: 75 IN | BODY MASS INDEX: 24.74 KG/M2 | SYSTOLIC BLOOD PRESSURE: 96 MMHG

## 2017-05-31 DIAGNOSIS — J43.8 OTHER EMPHYSEMA (HCC): ICD-10-CM

## 2017-05-31 DIAGNOSIS — R25.1 TREMOR OF BOTH HANDS: ICD-10-CM

## 2017-05-31 DIAGNOSIS — I48.0 PAROXYSMAL ATRIAL FIBRILLATION (HCC): Primary | ICD-10-CM

## 2017-05-31 DIAGNOSIS — E78.2 MIXED HYPERLIPIDEMIA: ICD-10-CM

## 2017-05-31 PROBLEM — G62.9 PERIPHERAL NEUROPATHY: Status: ACTIVE | Noted: 2017-05-31

## 2017-05-31 PROBLEM — F20.81 SCHIZOPHRENIFORM DISORDER (HCC): Status: ACTIVE | Noted: 2017-05-31

## 2017-05-31 PROBLEM — F31.9 BIPOLAR AFFECTIVE (HCC): Status: ACTIVE | Noted: 2017-05-31

## 2017-05-31 PROCEDURE — 99214 OFFICE O/P EST MOD 30 MIN: CPT | Performed by: NURSE PRACTITIONER

## 2017-05-31 RX ORDER — LOVASTATIN 40 MG/1
40 TABLET ORAL EVERY EVENING
Qty: 30 TAB | Refills: 11 | Status: SHIPPED | OUTPATIENT
Start: 2017-05-31

## 2017-05-31 RX ORDER — GABAPENTIN 300 MG/1
300 CAPSULE ORAL 3 TIMES DAILY
COMMUNITY

## 2017-05-31 RX ORDER — AMIODARONE HYDROCHLORIDE 200 MG/1
200 TABLET ORAL DAILY
Qty: 30 TAB | Refills: 11 | Status: SHIPPED | OUTPATIENT
Start: 2017-05-31 | End: 2018-05-02

## 2017-05-31 ASSESSMENT — ENCOUNTER SYMPTOMS
CLAUDICATION: 0
WEAKNESS: 1
DIZZINESS: 1
MYALGIAS: 0
PND: 0
PALPITATIONS: 0
ORTHOPNEA: 0
ABDOMINAL PAIN: 0
TREMORS: 1
COUGH: 0

## 2017-05-31 NOTE — MR AVS SNAPSHOT
"        Satya Aguilar   2017 2:40 PM   Office Visit   MRN: [id number]    Department:  [facility]   Dept Phone:  [phone]    Description:  Male : 1955   Provider:  ABBIE Liu           Reason for Visit     Hospital Follow-up           Allergies as of 2017     No Known Allergies      You were diagnosed with     Paroxysmal atrial fibrillation (CMS-HCC)   [422050]  -  Primary     Mixed hyperlipidemia   [272.2.ICD-9-CM]       Other emphysema (CMS-[facility])   [492.8.ICD-9-CM]       Tremor of both hands   [4555074]         Vital Signs     Blood Pressure Pulse Height Weight Body Mass Index Oxygen Saturation    96/60 mmHg 76 1.905 m (6' 3\") 90.266 kg (199 lb) 24.87 kg/m2 92%    Smoking Status                   Never Smoker            Basic Information     Date Of Birth Sex Race Ethnicity Preferred Language    1955 Male White Non- English      Problem List              ICD-10-CM Priority Class Noted - Resolved    Bradycardia with 51-60 beats per minute R00.1   2017 - Present    Iron deficiency anemia secondary to inadequate dietary iron intake D50.8   2017 - Present    COPD (chronic obstructive pulmonary disease) (CMS-HCC) J44.9   5/3/2017 - Present    DM type 2 (diabetes mellitus, type 2) (CMS-HCC) E11.9   5/3/2017 - Present    Right foot ulcer (CMS-HCC) L97.519   2017 - Present    Paroxysmal atrial fibrillation (CMS-HCC) I48.0   2017 - Present    DM2 (diabetes mellitus, type 2) (CMS-HCC) E11.9   2017 - Present    Tremor of both hands R25.1   2017 - Present    Peripheral neuropathy G62.9   2017 - Present    Schizophreniform disorder (CMS-HCC) F20.81   2017 - Present    Bipolar affective (CMS-HCC) F31.9   2017 - Present    Mixed hyperlipidemia E78.2   2017 - Present      Health Maintenance     Patient has no pending health maintenance at this time      Current Immunizations     Influenza Vaccine Adult HD 2017      Below and/or " attached are the medications your provider expects you to take. Review all of your home medications and newly ordered medications with your provider and/or pharmacist. Follow medication instructions as directed by your provider and/or pharmacist. Please keep your medication list with you and share with your provider. Update the information when medications are discontinued, doses are changed, or new medications (including over-the-counter products) are added; and carry medication information at all times in the event of emergency situations     Allergies:  No Known Allergies          Medications  Valid as of: May 31, 2017 -  3:52 PM    Generic Name Brand Name Tablet Size Instructions for use    Albuterol Sulfate (Aero Soln) albuterol 108 (90 BASE) MCG/ACT Inhale 2 Puffs by mouth every 1 hour as needed.        Amiodarone HCl (Tab) CORDARONE 200 MG Take 1 Tab by mouth every day.        Aspirin (Tab) aspirin 81 MG Take 81 mg by mouth every day.        Citalopram Hydrobromide (Tab) CELEXA 10 MG Take 10 mg by mouth every day.        Gabapentin (Cap) NEURONTIN 300 MG Take 300 mg by mouth 3 times a day.        Gemfibrozil (Tab) LOPID 600 MG Take 600 mg by mouth 2 times a day.        GlyBURIDE (Tab) DIABETA 2.5 MG Take 2.5 mg by mouth every morning with breakfast.        Lovastatin (Tab) MEVACOR 40 MG Take 1 Tab by mouth every evening.        MetFORMIN HCl (Tab) GLUCOPHAGE 1000 MG Take 1,000 mg by mouth 2 times a day, with meals.        Metoprolol Tartrate (Tab) LOPRESSOR 25 MG Take 0.5 Tabs by mouth 2 Times a Day.        RisperiDONE (Tab) RISPERDAL 3 MG Take 4 mg by mouth every evening.        Rivaroxaban (Tab) XARELTO 20 MG Take 1 Tab by mouth with dinner.        Tiotropium Bromide Monohydrate (Cap) SPIRIVA 18 MCG Inhale 1 Cap by mouth every day.        .                 Medicines prescribed today were sent to:     [id number] - [place] - [address]    [address] [address]    Phone: [phone] Fax:  [phone]    Open 24 Hours?: No      Medication refill instructions:       If your prescription bottle indicates you have medication refills left, it is not necessary to call your provider’s office. Please contact your pharmacy and they will refill your medication.    If your prescription bottle indicates you do not have any refills left, you may request refills at any time through one of the following ways: The online Embrace+ system (except Urgent Care), by calling your provider’s office, or by asking your pharmacy to contact your provider’s office with a refill request. Medication refills are processed only during regular business hours and may not be available until the next business day. Your provider may request additional information or to have a follow-up visit with you prior to refilling your medication.   *Please Note: Medication refills are assigned a new Rx number when refilled electronically. Your pharmacy may indicate that no refills were authorized even though a new prescription for the same medication is available at the pharmacy. Please request the medicine by name with the pharmacy before contacting your provider for a refill.        Your To Do List     Future Labs/Procedures Complete By Expires    COMP METABOLIC PANEL  As directed 5/31/2018    LIPID PROFILE  As directed 5/31/2018         Embrace+ Access Code: [email or web address]  Expires: 6/27/2017  4:14 AM    Embrace+  A secure, online tool to manage your health information     [employer]’s Embrace+® is a secure, online tool that connects you to your personalized health information from the privacy of your home -- day or night - making it very easy for you to manage your healthcare. Once the activation process is completed, you can even access your medical information using the Embrace+ janice, which is available for free in the Apple Janice store or Google Play store.     Embrace+ provides the following levels of access (as shown below):   My Chart  Features   [facility] Doctor [facility]  Specialists [facility]  Urgent  Care Non-[facility]  Primary Care  Doctor   Email your healthcare team securely and privately 24/7 X X X    Manage appointments: schedule your next appointment; view details of past/upcoming appointments X      Request prescription refills. X      View recent personal medical records, including lab and immunizations X X X X   View health record, including health history, allergies, medications X X X X   Read reports about your outpatient visits, procedures, consult and ER notes X X X X   See your discharge summary, which is a recap of your hospital and/or ER visit that includes your diagnosis, lab results, and care plan. X X       How to register for [employer]:  1. Go to  [email or web address].  2. Click on the Sign Up Now box, which takes you to the New Member Sign Up page. You will need to provide the following information:  a. Enter your [employer] Access Code exactly as it appears at the top of this page. (You will not need to use this code after you’ve completed the sign-up process. If you do not sign up before the expiration date, you must request a new code.)   b. Enter your date of birth.   c. Enter your home email address.   d. Click Submit, and follow the next screen’s instructions.  3. Create a [employer] ID. This will be your [employer] login ID and cannot be changed, so think of one that is secure and easy to remember.  4. Create a [employer] password. You can change your password at any time.  5. Enter your Password Reset Question and Answer. This can be used at a later time if you forget your password.   6. Enter your e-mail address. This allows you to receive e-mail notifications when new information is available in [employer].  7. Click Sign Up. You can now view your health information.    For assistance activating your [employer] account, call [phone]

## 2017-05-31 NOTE — Clinical Note
[facility]   [address] [place] [address]  WILLIAM Godinez [address]  Phone: [phone]  Fax: [phone]              Satya Aguilar  1955    Encounter Date: 5/31/2017    ABBIE Liu          PROGRESS NOTE:  Subjective:   Satya Aguilar is a 61 y.o. male who presents today for hospital follow-up. He presented to the hospital after having had a syncopal episode at home. He was felt to be dehydrated and possibly septic secondary to colitis. During his hospitalization he had paroxysmal atrial fibrillation and was started on amiodarone, metoprolol and Xarelto.    He'll return to the hospital and was seen in the emergency room due to feeling dizzy and weak. His blood pressure was 103 systolic during his ER visit. He had a workup which was essentially negative. No change in medications was done.    He has a history of diabetes, COPD, tremors, hyperlipidemia and hypertension. He also tells me that he is bipolar with schizophrenia. He states this is well-controlled.    Since been out-of-hospital, he denies any palpitations or rapid heartbeats. He's not had any chest tightness, heaviness or pressure. He has shortness of breath with exertion secondary to COPD. He has complained of sweating of his head particularly at night. Temperature checked in the office today was normal at 97°F. He complains of fatigue and weakness. He complains of lightheadedness with position change.    He continues to have tremors of his hands and occasionally of his legs. This has not been evaluated beyond his primary care.    Past Medical History   Diagnosis Date   • Hypertension    • Diabetes (CMS-HCC)    • Asthma    • High cholesterol    • Paroxysmal atrial fibrillation (CMS-HCC)    • COPD (chronic obstructive pulmonary disease) (CMS-[facility])      History reviewed. No pertinent past surgical history.  History reviewed. No pertinent family history.  History   Smoking status   • Never Smoker       Smokeless tobacco   • Never Used     No Known Allergies  Outpatient Encounter Prescriptions as of 5/31/2017   Medication Sig Dispense Refill   • gabapentin (NEURONTIN) 300 MG Cap Take 300 mg by mouth 3 times a day.     • amiodarone (CORDARONE) 200 MG Tab Take 1 Tab by mouth every day. 30 Tab 11   • metoprolol (LOPRESSOR) 25 MG Tab Take 0.5 Tabs by mouth 2 Times a Day. 30 Tab 11   • rivaroxaban (XARELTO) 20 MG Tab tablet Take 1 Tab by mouth with dinner. 30 Tab 11   • lovastatin (MEVACOR) 40 MG tablet Take 1 Tab by mouth every evening. 30 Tab 11   • gemfibrozil (LOPID) 600 MG Tab Take 600 mg by mouth 2 times a day.     • aspirin 81 MG tablet Take 81 mg by mouth every day.     • tiotropium (SPIRIVA) 18 MCG Cap Inhale 1 Cap by mouth every day. 30 Cap 3   • risperidone (RISPERDAL) 3 MG Tab Take 4 mg by mouth every evening.     • citalopram (CELEXA) 10 MG tablet Take 10 mg by mouth every day.     • glyBURIDE (DIABETA) 2.5 MG Tab Take 2.5 mg by mouth every morning with breakfast.     • metformin (GLUCOPHAGE) 1000 MG tablet Take 1,000 mg by mouth 2 times a day, with meals.     • albuterol 108 (90 BASE) MCG/ACT Aero Soln inhalation aerosol Inhale 2 Puffs by mouth every 1 hour as needed. 8.5 g 3   • [DISCONTINUED] amiodarone (PACERONE) 400 MG tablet Take 1 Tab by mouth 2 Times a Day. 30 Tab 3   • [DISCONTINUED] metoprolol (LOPRESSOR) 25 MG Tab Take 1 Tab by mouth 2 Times a Day. 60 Tab 3   • [DISCONTINUED] rivaroxaban (XARELTO) 20 MG Tab tablet Take 1 Tab by mouth with dinner. 30 Tab 3   • [DISCONTINUED] gabapentin (NEURONTIN) 400 MG Cap Take 300 mg by mouth 3 times a day.     • [DISCONTINUED] lovastatin (MEVACOR) 40 MG tablet Take 40 mg by mouth every evening.       No facility-administered encounter medications on file as of 5/31/2017.     Review of Systems   Constitutional: Positive for malaise/fatigue.   Respiratory: Negative for cough.    Cardiovascular: Negative for chest pain, palpitations, orthopnea, claudication,  "leg swelling and PND.   Gastrointestinal: Negative for abdominal pain.   Musculoskeletal: Negative for myalgias.   Neurological: Positive for dizziness (position change), tremors (both hands.) and weakness.        Objective:   BP 96/60 mmHg  Pulse 76  Ht 1.905 m (6' 3\")  Wt 90.266 kg (199 lb)  BMI 24.87 kg/m2  SpO2 92%    Physical Exam   Constitutional: He is oriented to person, place, and time. He appears well-developed and well-nourished.   HENT:   Head: Normocephalic.   Eyes: Conjunctivae are normal.   Neck: No JVD present. No thyromegaly present.   Cardiovascular: Normal rate, regular rhythm and normal heart sounds.    Pulmonary/Chest: Effort normal and breath sounds normal. He has no wheezes. He has no rales.   Abdominal: Soft. Bowel sounds are normal. He exhibits no distension. There is no tenderness.   Musculoskeletal: He exhibits no edema.   Neurological: He is alert and oriented to person, place, and time.   Skin: Skin is warm and dry.   Psychiatric: He has a normal mood and affect.     Results for BONIFACIO RESENDIZ (MRN [id number]) as of 5/31/2017 15:15   Ref. Range 5/22/2017 21:40   WBC Latest Ref Range: 4.8-10.8 K/uL 8.5   RBC Latest Ref Range: 4.70-6.10 M/uL 4.51 (L)   Hemoglobin Latest Ref Range: 14.0-18.0 g/dL 12.8 (L)   Hematocrit Latest Ref Range: 42.0-52.0 % 40.3 (L)   MCV Latest Ref Range: 81.4-97.8 fL 89.4   MCH Latest Ref Range: 27.0-33.0 pg 28.4   MCHC Latest Ref Range: 33.7-35.3 g/dL 31.8 (L)   RDW Latest Ref Range: 35.9-50.0 fL 42.0   Platelet Count Latest Ref Range: 164-446 K/uL 344   MPV Latest Ref Range: 9.0-12.9 fL 11.1   Neutrophils-Polys Latest Ref Range: 44.00-72.00 % 56.70   Neutrophils (Absolute) Latest Ref Range: 1.82-7.42 K/uL 4.80   Lymphocytes Latest Ref Range: 22.00-41.00 % 33.10   Lymphs (Absolute) Latest Ref Range: 1.00-4.80 K/uL 2.80   Monocytes Latest Ref Range: 0.00-13.40 % 5.10   Monos (Absolute) Latest Ref Range: 0.00-0.85 K/uL 0.43   Eosinophils Latest Ref Range: " 0.00-6.90 % 3.80   Eos (Absolute) Latest Ref Range: 0.00-0.51 K/uL 0.32   Basophils Latest Ref Range: 0.00-1.80 % 0.90   Baso (Absolute) Latest Ref Range: 0.00-0.12 K/uL 0.08   Immature Granulocytes Latest Ref Range: 0.00-0.90 % 0.40   Immature Granulocytes (abs) Latest Ref Range: 0.00-0.11 K/uL 0.03   Nucleated RBC Latest Units: /100 WBC 0.00   NRBC (Absolute) Latest Units: K/uL 0.00   Sodium Latest Ref Range: 135-145 mmol/L 138   Potassium Latest Ref Range: 3.6-5.5 mmol/L 4.1   Chloride Latest Ref Range:  mmol/L 108   Co2 Latest Ref Range: 20-33 mmol/L 20   Anion Gap Latest Ref Range: 0.0-11.9  10.0   Glucose Latest Ref Range: 65-99 mg/dL 70   Bun Latest Ref Range: 8-22 mg/dL 23 (H)   Creatinine Latest Ref Range: 0.50-1.40 mg/dL 1.21   GFR If  Latest Ref Range: >60 mL/min/1.73 m 2 >60   GFR If Non  Latest Ref Range: >60 mL/min/1.73 m 2 >60   Calcium Latest Ref Range: 8.5-10.5 mg/dL 9.5   AST(SGOT) Latest Ref Range: 12-45 U/L 10 (L)   ALT(SGPT) Latest Ref Range: 2-50 U/L 7   Alkaline Phosphatase Latest Ref Range: 30-99 U/L 78   Total Bilirubin Latest Ref Range: 0.1-1.5 mg/dL 0.5   Albumin Latest Ref Range: 3.2-4.9 g/dL 4.2   Total Protein Latest Ref Range: 6.0-8.2 g/dL 7.6   Globulin Latest Ref Range: 1.9-3.5 g/dL 3.4   A-G Ratio Latest Units: g/dL 1.2   CK-Mb Latest Ref Range: 0.0-5.0 ng/mL 2.5   Troponin I Latest Ref Range: 0.00-0.04 ng/mL <0.01   B Natriuretic Peptide Latest Ref Range: 0-100 pg/mL <2       February 21st 2017: Transthoracic Echo Report  No prior study is available for comparison.   Normal diastolic function. Left ventricular ejection fraction is   visually estimated to be 65%.  Mild mitral regurgitation.  Estimated right ventricular systolic pressure is 28 mmHg. Right atrial   pressure is estimated to be 3 mmHg.   No prior study is available for comparison.     Assessment:     1. Paroxysmal atrial fibrillation (CMS-HCC)  amiodarone (CORDARONE) 200 MG Tab     metoprolol (LOPRESSOR) 25 MG Tab    rivaroxaban (XARELTO) 20 MG Tab tablet   2. Mixed hyperlipidemia  lovastatin (MEVACOR) 40 MG tablet    COMP METABOLIC PANEL    LIPID PROFILE   3. Other emphysema (CMS-HCC)     4. Tremor of both hands         Medical Decision Making:  Today's Assessment / Status / Plan:     Paroxysmal atrial fibrillation: He is in a regular rhythm today. I will reduce amiodarone to 200 mg daily for maintenance. Since his blood pressure is low, I will reduce metoprolol to 12.5 mg twice a day. He will continue on Xarelto as he may go back into atrial fibrillation.    Hyperlipidemia: He has been on lovastatin and gemfibrozil previously. I will check lipids and metabolic panel in 3 months.    COPD/emphysema: His lungs are clear today and I hear no adventitious sounds. He does have some shortness of breath with exertion which I would expect due to his lung disease.    Tremor of hands: He also complains of occasional tremor in his legs. Possibly he has Parkinson's but this has not been evaluated. He may need referral to neurology for further evaluation and treatment.    He stable enough from a cardiovascular standpoint to follow up in 3 months with Dr. Loyd Hou. He will follow-up sooner if problems.    Collaborating Provider: Dr. Loyd Hou.        No Recipients

## 2017-05-31 NOTE — PROGRESS NOTES
Subjective:   Satya Aguilar is a 61 y.o. male who presents today for hospital follow-up. He presented to the hospital after having had a syncopal episode at home. He was felt to be dehydrated and possibly septic secondary to colitis. During his hospitalization he had paroxysmal atrial fibrillation and was started on amiodarone, metoprolol and Xarelto.    He'll return to the hospital and was seen in the emergency room due to feeling dizzy and weak. His blood pressure was 103 systolic during his ER visit. He had a workup which was essentially negative. No change in medications was done.    He has a history of diabetes, COPD, tremors, hyperlipidemia and hypertension. He also tells me that he is bipolar with schizophrenia. He states this is well-controlled.    Since been out-of-hospital, he denies any palpitations or rapid heartbeats. He's not had any chest tightness, heaviness or pressure. He has shortness of breath with exertion secondary to COPD. He has complained of sweating of his head particularly at night. Temperature checked in the office today was normal at 97°F. He complains of fatigue and weakness. He complains of lightheadedness with position change.    He continues to have tremors of his hands and occasionally of his legs. This has not been evaluated beyond his primary care.    Past Medical History   Diagnosis Date   • Hypertension    • Diabetes (CMS-[facility])    • Asthma    • High cholesterol    • Paroxysmal atrial fibrillation (CMS-[facility])    • COPD (chronic obstructive pulmonary disease) (CMS-HCC)      History reviewed. No pertinent past surgical history.  History reviewed. No pertinent family history.  History   Smoking status   • Never Smoker    Smokeless tobacco   • Never Used     No Known Allergies  Outpatient Encounter Prescriptions as of 5/31/2017   Medication Sig Dispense Refill   • gabapentin (NEURONTIN) 300 MG Cap Take 300 mg by mouth 3 times a day.     • amiodarone (CORDARONE) 200 MG Tab Take 1 Tab by  "mouth every day. 30 Tab 11   • metoprolol (LOPRESSOR) 25 MG Tab Take 0.5 Tabs by mouth 2 Times a Day. 30 Tab 11   • rivaroxaban (XARELTO) 20 MG Tab tablet Take 1 Tab by mouth with dinner. 30 Tab 11   • lovastatin (MEVACOR) 40 MG tablet Take 1 Tab by mouth every evening. 30 Tab 11   • gemfibrozil (LOPID) 600 MG Tab Take 600 mg by mouth 2 times a day.     • aspirin 81 MG tablet Take 81 mg by mouth every day.     • tiotropium (SPIRIVA) 18 MCG Cap Inhale 1 Cap by mouth every day. 30 Cap 3   • risperidone (RISPERDAL) 3 MG Tab Take 4 mg by mouth every evening.     • citalopram (CELEXA) 10 MG tablet Take 10 mg by mouth every day.     • glyBURIDE (DIABETA) 2.5 MG Tab Take 2.5 mg by mouth every morning with breakfast.     • metformin (GLUCOPHAGE) 1000 MG tablet Take 1,000 mg by mouth 2 times a day, with meals.     • albuterol 108 (90 BASE) MCG/ACT Aero Soln inhalation aerosol Inhale 2 Puffs by mouth every 1 hour as needed. 8.5 g 3   • [DISCONTINUED] amiodarone (PACERONE) 400 MG tablet Take 1 Tab by mouth 2 Times a Day. 30 Tab 3   • [DISCONTINUED] metoprolol (LOPRESSOR) 25 MG Tab Take 1 Tab by mouth 2 Times a Day. 60 Tab 3   • [DISCONTINUED] rivaroxaban (XARELTO) 20 MG Tab tablet Take 1 Tab by mouth with dinner. 30 Tab 3   • [DISCONTINUED] gabapentin (NEURONTIN) 400 MG Cap Take 300 mg by mouth 3 times a day.     • [DISCONTINUED] lovastatin (MEVACOR) 40 MG tablet Take 40 mg by mouth every evening.       No facility-administered encounter medications on file as of 5/31/2017.     Review of Systems   Constitutional: Positive for malaise/fatigue.   Respiratory: Negative for cough.    Cardiovascular: Negative for chest pain, palpitations, orthopnea, claudication, leg swelling and PND.   Gastrointestinal: Negative for abdominal pain.   Musculoskeletal: Negative for myalgias.   Neurological: Positive for dizziness (position change), tremors (both hands.) and weakness.        Objective:   BP 96/60 mmHg  Pulse 76  Ht 1.905 m (6' 3\") "  Wt 90.266 kg (199 lb)  BMI 24.87 kg/m2  SpO2 92%    Physical Exam   Constitutional: He is oriented to person, place, and time. He appears well-developed and well-nourished.   HENT:   Head: Normocephalic.   Eyes: Conjunctivae are normal.   Neck: No JVD present. No thyromegaly present.   Cardiovascular: Normal rate, regular rhythm and normal heart sounds.    Pulmonary/Chest: Effort normal and breath sounds normal. He has no wheezes. He has no rales.   Abdominal: Soft. Bowel sounds are normal. He exhibits no distension. There is no tenderness.   Musculoskeletal: He exhibits no edema.   Neurological: He is alert and oriented to person, place, and time.   Skin: Skin is warm and dry.   Psychiatric: He has a normal mood and affect.     Results for BONIFACIO RESENDIZ (MRN [id number]) as of 5/31/2017 15:15   Ref. Range 5/22/2017 21:40   WBC Latest Ref Range: 4.8-10.8 K/uL 8.5   RBC Latest Ref Range: 4.70-6.10 M/uL 4.51 (L)   Hemoglobin Latest Ref Range: 14.0-18.0 g/dL 12.8 (L)   Hematocrit Latest Ref Range: 42.0-52.0 % 40.3 (L)   MCV Latest Ref Range: 81.4-97.8 fL 89.4   MCH Latest Ref Range: 27.0-33.0 pg 28.4   MCHC Latest Ref Range: 33.7-35.3 g/dL 31.8 (L)   RDW Latest Ref Range: 35.9-50.0 fL 42.0   Platelet Count Latest Ref Range: 164-446 K/uL 344   MPV Latest Ref Range: 9.0-12.9 fL 11.1   Neutrophils-Polys Latest Ref Range: 44.00-72.00 % 56.70   Neutrophils (Absolute) Latest Ref Range: 1.82-7.42 K/uL 4.80   Lymphocytes Latest Ref Range: 22.00-41.00 % 33.10   Lymphs (Absolute) Latest Ref Range: 1.00-4.80 K/uL 2.80   Monocytes Latest Ref Range: 0.00-13.40 % 5.10   Monos (Absolute) Latest Ref Range: 0.00-0.85 K/uL 0.43   Eosinophils Latest Ref Range: 0.00-6.90 % 3.80   Eos (Absolute) Latest Ref Range: 0.00-0.51 K/uL 0.32   Basophils Latest Ref Range: 0.00-1.80 % 0.90   Baso (Absolute) Latest Ref Range: 0.00-0.12 K/uL 0.08   Immature Granulocytes Latest Ref Range: 0.00-0.90 % 0.40   Immature Granulocytes (abs) Latest Ref Range:  0.00-0.11 K/uL 0.03   Nucleated RBC Latest Units: /100 WBC 0.00   NRBC (Absolute) Latest Units: K/uL 0.00   Sodium Latest Ref Range: 135-145 mmol/L 138   Potassium Latest Ref Range: 3.6-5.5 mmol/L 4.1   Chloride Latest Ref Range:  mmol/L 108   Co2 Latest Ref Range: 20-33 mmol/L 20   Anion Gap Latest Ref Range: 0.0-11.9  10.0   Glucose Latest Ref Range: 65-99 mg/dL 70   Bun Latest Ref Range: 8-22 mg/dL 23 (H)   Creatinine Latest Ref Range: 0.50-1.40 mg/dL 1.21   GFR If  Latest Ref Range: >60 mL/min/1.73 m 2 >60   GFR If Non  Latest Ref Range: >60 mL/min/1.73 m 2 >60   Calcium Latest Ref Range: 8.5-10.5 mg/dL 9.5   AST(SGOT) Latest Ref Range: 12-45 U/L 10 (L)   ALT(SGPT) Latest Ref Range: 2-50 U/L 7   Alkaline Phosphatase Latest Ref Range: 30-99 U/L 78   Total Bilirubin Latest Ref Range: 0.1-1.5 mg/dL 0.5   Albumin Latest Ref Range: 3.2-4.9 g/dL 4.2   Total Protein Latest Ref Range: 6.0-8.2 g/dL 7.6   Globulin Latest Ref Range: 1.9-3.5 g/dL 3.4   A-G Ratio Latest Units: g/dL 1.2   CK-Mb Latest Ref Range: 0.0-5.0 ng/mL 2.5   Troponin I Latest Ref Range: 0.00-0.04 ng/mL <0.01   B Natriuretic Peptide Latest Ref Range: 0-100 pg/mL <2       February 21st 2017: Transthoracic Echo Report  No prior study is available for comparison.   Normal diastolic function. Left ventricular ejection fraction is   visually estimated to be 65%.  Mild mitral regurgitation.  Estimated right ventricular systolic pressure is 28 mmHg. Right atrial   pressure is estimated to be 3 mmHg.   No prior study is available for comparison.     Assessment:     1. Paroxysmal atrial fibrillation (CMS-HCC)  amiodarone (CORDARONE) 200 MG Tab    metoprolol (LOPRESSOR) 25 MG Tab    rivaroxaban (XARELTO) 20 MG Tab tablet   2. Mixed hyperlipidemia  lovastatin (MEVACOR) 40 MG tablet    COMP METABOLIC PANEL    LIPID PROFILE   3. Other emphysema (CMS-HCC)     4. Tremor of both hands         Medical Decision Making:  Today's  Assessment / Status / Plan:     Paroxysmal atrial fibrillation: He is in a regular rhythm today. I will reduce amiodarone to 200 mg daily for maintenance. Since his blood pressure is low, I will reduce metoprolol to 12.5 mg twice a day. He will continue on Xarelto as he may go back into atrial fibrillation.    Hyperlipidemia: He has been on lovastatin and gemfibrozil previously. I will check lipids and metabolic panel in 3 months.    COPD/emphysema: His lungs are clear today and I hear no adventitious sounds. He does have some shortness of breath with exertion which I would expect due to his lung disease.    Tremor of hands: He also complains of occasional tremor in his legs. Possibly he has Parkinson's but this has not been evaluated. He may need referral to neurology for further evaluation and treatment.    He stable enough from a cardiovascular standpoint to follow up in 3 months with Dr. Loyd Hou. He will follow-up sooner if problems.    Collaborating Provider: Dr. Loyd Hou.

## 2017-06-01 ENCOUNTER — RESOLUTE PROFESSIONAL BILLING HOSPITAL PROF FEE (OUTPATIENT)
Dept: HOSPITALIST | Facility: MEDICAL CENTER | Age: 62
End: 2017-06-01
Payer: MEDICAID

## 2017-06-01 ENCOUNTER — HOSPITAL ENCOUNTER (INPATIENT)
Facility: MEDICAL CENTER | Age: 62
LOS: 2 days | DRG: 309 | End: 2017-06-03
Attending: EMERGENCY MEDICINE | Admitting: HOSPITALIST
Payer: COMMERCIAL

## 2017-06-01 ENCOUNTER — APPOINTMENT (OUTPATIENT)
Dept: RADIOLOGY | Facility: MEDICAL CENTER | Age: 62
DRG: 309 | End: 2017-06-01
Attending: EMERGENCY MEDICINE
Payer: COMMERCIAL

## 2017-06-01 DIAGNOSIS — R00.2 PALPITATIONS: ICD-10-CM

## 2017-06-01 DIAGNOSIS — E87.20 LACTIC ACIDOSIS: ICD-10-CM

## 2017-06-01 DIAGNOSIS — R53.1 WEAKNESS: ICD-10-CM

## 2017-06-01 PROBLEM — E87.5 ACUTE HYPERKALEMIA: Status: ACTIVE | Noted: 2017-06-01

## 2017-06-01 LAB
ALBUMIN SERPL BCP-MCNC: 4.3 G/DL (ref 3.2–4.9)
ALBUMIN/GLOB SERPL: 1.5 G/DL
ALP SERPL-CCNC: 75 U/L (ref 30–99)
ALT SERPL-CCNC: 9 U/L (ref 2–50)
ANION GAP SERPL CALC-SCNC: 9 MMOL/L (ref 0–11.9)
AST SERPL-CCNC: 9 U/L (ref 12–45)
BASOPHILS # BLD AUTO: 0.4 % (ref 0–1.8)
BASOPHILS # BLD AUTO: 0.6 % (ref 0–1.8)
BASOPHILS # BLD: 0.04 K/UL (ref 0–0.12)
BASOPHILS # BLD: 0.05 K/UL (ref 0–0.12)
BILIRUB SERPL-MCNC: 0.3 MG/DL (ref 0.1–1.5)
BNP SERPL-MCNC: 11 PG/ML (ref 0–100)
BUN SERPL-MCNC: 14 MG/DL (ref 8–22)
CALCIUM SERPL-MCNC: 9.5 MG/DL (ref 8.5–10.5)
CHLORIDE SERPL-SCNC: 103 MMOL/L (ref 96–112)
CO2 SERPL-SCNC: 25 MMOL/L (ref 20–33)
CREAT SERPL-MCNC: 1.16 MG/DL (ref 0.5–1.4)
EKG IMPRESSION: NORMAL
EOSINOPHIL # BLD AUTO: 0.64 K/UL (ref 0–0.51)
EOSINOPHIL # BLD AUTO: 0.67 K/UL (ref 0–0.51)
EOSINOPHIL NFR BLD: 6.8 % (ref 0–6.9)
EOSINOPHIL NFR BLD: 7.6 % (ref 0–6.9)
ERYTHROCYTE [DISTWIDTH] IN BLOOD BY AUTOMATED COUNT: 43 FL (ref 35.9–50)
ERYTHROCYTE [DISTWIDTH] IN BLOOD BY AUTOMATED COUNT: 43.4 FL (ref 35.9–50)
GFR SERPL CREATININE-BSD FRML MDRD: >60 ML/MIN/1.73 M 2
GLOBULIN SER CALC-MCNC: 2.9 G/DL (ref 1.9–3.5)
GLUCOSE BLD-MCNC: 215 MG/DL (ref 65–99)
GLUCOSE SERPL-MCNC: 206 MG/DL (ref 65–99)
HCT VFR BLD AUTO: 34.2 % (ref 42–52)
HCT VFR BLD AUTO: 37.8 % (ref 42–52)
HGB BLD-MCNC: 10.6 G/DL (ref 14–18)
HGB BLD-MCNC: 11.9 G/DL (ref 14–18)
IMM GRANULOCYTES # BLD AUTO: 0.02 K/UL (ref 0–0.11)
IMM GRANULOCYTES # BLD AUTO: 0.02 K/UL (ref 0–0.11)
IMM GRANULOCYTES NFR BLD AUTO: 0.2 % (ref 0–0.9)
IMM GRANULOCYTES NFR BLD AUTO: 0.2 % (ref 0–0.9)
LACTATE BLD-SCNC: 2.4 MMOL/L (ref 0.5–2)
LYMPHOCYTES # BLD AUTO: 1.98 K/UL (ref 1–4.8)
LYMPHOCYTES # BLD AUTO: 2.79 K/UL (ref 1–4.8)
LYMPHOCYTES NFR BLD: 21 % (ref 22–41)
LYMPHOCYTES NFR BLD: 31.8 % (ref 22–41)
MCH RBC QN AUTO: 28.3 PG (ref 27–33)
MCH RBC QN AUTO: 28.3 PG (ref 27–33)
MCHC RBC AUTO-ENTMCNC: 31 G/DL (ref 33.7–35.3)
MCHC RBC AUTO-ENTMCNC: 31.5 G/DL (ref 33.7–35.3)
MCV RBC AUTO: 89.8 FL (ref 81.4–97.8)
MCV RBC AUTO: 91.2 FL (ref 81.4–97.8)
MONOCYTES # BLD AUTO: 0.46 K/UL (ref 0–0.85)
MONOCYTES # BLD AUTO: 0.51 K/UL (ref 0–0.85)
MONOCYTES NFR BLD AUTO: 4.9 % (ref 0–13.4)
MONOCYTES NFR BLD AUTO: 5.8 % (ref 0–13.4)
NEUTROPHILS # BLD AUTO: 4.73 K/UL (ref 1.82–7.42)
NEUTROPHILS # BLD AUTO: 6.29 K/UL (ref 1.82–7.42)
NEUTROPHILS NFR BLD: 54 % (ref 44–72)
NEUTROPHILS NFR BLD: 66.7 % (ref 44–72)
NRBC # BLD AUTO: 0 K/UL
NRBC # BLD AUTO: 0 K/UL
NRBC BLD AUTO-RTO: 0 /100 WBC
NRBC BLD AUTO-RTO: 0 /100 WBC
PLATELET # BLD AUTO: 232 K/UL (ref 164–446)
PLATELET # BLD AUTO: 252 K/UL (ref 164–446)
PMV BLD AUTO: 11.2 FL (ref 9–12.9)
PMV BLD AUTO: 11.3 FL (ref 9–12.9)
POTASSIUM SERPL-SCNC: 5.7 MMOL/L (ref 3.6–5.5)
PROT SERPL-MCNC: 7.2 G/DL (ref 6–8.2)
RBC # BLD AUTO: 3.75 M/UL (ref 4.7–6.1)
RBC # BLD AUTO: 4.21 M/UL (ref 4.7–6.1)
SODIUM SERPL-SCNC: 137 MMOL/L (ref 135–145)
TROPONIN I SERPL-MCNC: <0.01 NG/ML (ref 0–0.04)
TSH SERPL DL<=0.005 MIU/L-ACNC: 1.09 UIU/ML (ref 0.3–3.7)
WBC # BLD AUTO: 8.8 K/UL (ref 4.8–10.8)
WBC # BLD AUTO: 9.4 K/UL (ref 4.8–10.8)

## 2017-06-01 PROCEDURE — 99285 EMERGENCY DEPT VISIT HI MDM: CPT

## 2017-06-01 PROCEDURE — 700105 HCHG RX REV CODE 258: Performed by: HOSPITALIST

## 2017-06-01 PROCEDURE — 36415 COLL VENOUS BLD VENIPUNCTURE: CPT

## 2017-06-01 PROCEDURE — 85025 COMPLETE CBC W/AUTO DIFF WBC: CPT

## 2017-06-01 PROCEDURE — 80053 COMPREHEN METABOLIC PANEL: CPT

## 2017-06-01 PROCEDURE — 83605 ASSAY OF LACTIC ACID: CPT

## 2017-06-01 PROCEDURE — 93005 ELECTROCARDIOGRAM TRACING: CPT | Performed by: EMERGENCY MEDICINE

## 2017-06-01 PROCEDURE — A9270 NON-COVERED ITEM OR SERVICE: HCPCS | Performed by: HOSPITALIST

## 2017-06-01 PROCEDURE — 83880 ASSAY OF NATRIURETIC PEPTIDE: CPT

## 2017-06-01 PROCEDURE — 700102 HCHG RX REV CODE 250 W/ 637 OVERRIDE(OP): Performed by: HOSPITALIST

## 2017-06-01 PROCEDURE — 82962 GLUCOSE BLOOD TEST: CPT

## 2017-06-01 PROCEDURE — 700105 HCHG RX REV CODE 258: Performed by: EMERGENCY MEDICINE

## 2017-06-01 PROCEDURE — 80048 BASIC METABOLIC PNL TOTAL CA: CPT

## 2017-06-01 PROCEDURE — 84484 ASSAY OF TROPONIN QUANT: CPT

## 2017-06-01 PROCEDURE — 770020 HCHG ROOM/CARE - TELE (206)

## 2017-06-01 PROCEDURE — 84443 ASSAY THYROID STIM HORMONE: CPT

## 2017-06-01 PROCEDURE — 71010 DX-CHEST-PORTABLE (1 VIEW): CPT

## 2017-06-01 PROCEDURE — 99223 1ST HOSP IP/OBS HIGH 75: CPT | Performed by: HOSPITALIST

## 2017-06-01 PROCEDURE — 96360 HYDRATION IV INFUSION INIT: CPT

## 2017-06-01 PROCEDURE — 700111 HCHG RX REV CODE 636 W/ 250 OVERRIDE (IP): Performed by: HOSPITALIST

## 2017-06-01 PROCEDURE — 93005 ELECTROCARDIOGRAM TRACING: CPT

## 2017-06-01 RX ORDER — CITALOPRAM 20 MG/1
10 TABLET ORAL DAILY
Status: DISCONTINUED | OUTPATIENT
Start: 2017-06-02 | End: 2017-06-02

## 2017-06-01 RX ORDER — ACETAMINOPHEN 325 MG/1
650 TABLET ORAL EVERY 6 HOURS PRN
Status: DISCONTINUED | OUTPATIENT
Start: 2017-06-01 | End: 2017-06-03 | Stop reason: HOSPADM

## 2017-06-01 RX ORDER — ONDANSETRON 4 MG/1
4 TABLET, ORALLY DISINTEGRATING ORAL EVERY 4 HOURS PRN
Status: DISCONTINUED | OUTPATIENT
Start: 2017-06-01 | End: 2017-06-03 | Stop reason: HOSPADM

## 2017-06-01 RX ORDER — DEXTROSE MONOHYDRATE 25 G/50ML
25 INJECTION, SOLUTION INTRAVENOUS
Status: DISCONTINUED | OUTPATIENT
Start: 2017-06-01 | End: 2017-06-03 | Stop reason: HOSPADM

## 2017-06-01 RX ORDER — RISPERIDONE 4 MG/1
4 TABLET ORAL EVERY EVENING
COMMUNITY
End: 2018-05-02

## 2017-06-01 RX ORDER — TIOTROPIUM BROMIDE 18 UG/1
1 CAPSULE ORAL; RESPIRATORY (INHALATION) DAILY
Status: DISCONTINUED | OUTPATIENT
Start: 2017-06-02 | End: 2017-06-03 | Stop reason: HOSPADM

## 2017-06-01 RX ORDER — ALBUTEROL SULFATE 90 UG/1
2 AEROSOL, METERED RESPIRATORY (INHALATION)
Status: DISCONTINUED | OUTPATIENT
Start: 2017-06-01 | End: 2017-06-03 | Stop reason: HOSPADM

## 2017-06-01 RX ORDER — RISPERIDONE 2 MG/1
4 TABLET ORAL EVERY EVENING
Status: DISCONTINUED | OUTPATIENT
Start: 2017-06-01 | End: 2017-06-03 | Stop reason: HOSPADM

## 2017-06-01 RX ORDER — GEMFIBROZIL 600 MG/1
600 TABLET, FILM COATED ORAL 2 TIMES DAILY
Status: DISCONTINUED | OUTPATIENT
Start: 2017-06-02 | End: 2017-06-03 | Stop reason: HOSPADM

## 2017-06-01 RX ORDER — DIPHENHYDRAMINE HCL 25 MG
25 TABLET ORAL EVERY 6 HOURS PRN
Status: DISCONTINUED | OUTPATIENT
Start: 2017-06-01 | End: 2017-06-03 | Stop reason: HOSPADM

## 2017-06-01 RX ORDER — AMIODARONE HYDROCHLORIDE 200 MG/1
200 TABLET ORAL DAILY
Status: DISCONTINUED | OUTPATIENT
Start: 2017-06-02 | End: 2017-06-02

## 2017-06-01 RX ORDER — BISACODYL 10 MG
10 SUPPOSITORY, RECTAL RECTAL
Status: DISCONTINUED | OUTPATIENT
Start: 2017-06-01 | End: 2017-06-03 | Stop reason: HOSPADM

## 2017-06-01 RX ORDER — GABAPENTIN 300 MG/1
300 CAPSULE ORAL 3 TIMES DAILY
Status: DISCONTINUED | OUTPATIENT
Start: 2017-06-01 | End: 2017-06-03 | Stop reason: HOSPADM

## 2017-06-01 RX ORDER — AMOXICILLIN 250 MG
2 CAPSULE ORAL 2 TIMES DAILY
Status: DISCONTINUED | OUTPATIENT
Start: 2017-06-01 | End: 2017-06-03 | Stop reason: HOSPADM

## 2017-06-01 RX ORDER — PROMETHAZINE HYDROCHLORIDE 25 MG/1
12.5-25 TABLET ORAL EVERY 4 HOURS PRN
Status: DISCONTINUED | OUTPATIENT
Start: 2017-06-01 | End: 2017-06-03 | Stop reason: HOSPADM

## 2017-06-01 RX ORDER — PROMETHAZINE HYDROCHLORIDE 25 MG/1
12.5-25 SUPPOSITORY RECTAL EVERY 4 HOURS PRN
Status: DISCONTINUED | OUTPATIENT
Start: 2017-06-01 | End: 2017-06-03 | Stop reason: HOSPADM

## 2017-06-01 RX ORDER — LOVASTATIN 20 MG/1
40 TABLET ORAL EVERY EVENING
Status: DISCONTINUED | OUTPATIENT
Start: 2017-06-01 | End: 2017-06-03 | Stop reason: HOSPADM

## 2017-06-01 RX ORDER — SODIUM CHLORIDE 9 MG/ML
INJECTION, SOLUTION INTRAVENOUS CONTINUOUS
Status: DISCONTINUED | OUTPATIENT
Start: 2017-06-01 | End: 2017-06-03 | Stop reason: HOSPADM

## 2017-06-01 RX ORDER — POLYETHYLENE GLYCOL 3350 17 G/17G
1 POWDER, FOR SOLUTION ORAL
Status: DISCONTINUED | OUTPATIENT
Start: 2017-06-01 | End: 2017-06-03 | Stop reason: HOSPADM

## 2017-06-01 RX ORDER — ONDANSETRON 2 MG/ML
4 INJECTION INTRAMUSCULAR; INTRAVENOUS EVERY 4 HOURS PRN
Status: DISCONTINUED | OUTPATIENT
Start: 2017-06-01 | End: 2017-06-03 | Stop reason: HOSPADM

## 2017-06-01 RX ORDER — SODIUM CHLORIDE 9 MG/ML
1000 INJECTION, SOLUTION INTRAVENOUS ONCE
Status: COMPLETED | OUTPATIENT
Start: 2017-06-01 | End: 2017-06-01

## 2017-06-01 RX ORDER — DIPHENHYDRAMINE HYDROCHLORIDE 50 MG/ML
12.5-25 INJECTION INTRAMUSCULAR; INTRAVENOUS EVERY 6 HOURS PRN
Status: DISCONTINUED | OUTPATIENT
Start: 2017-06-01 | End: 2017-06-03 | Stop reason: HOSPADM

## 2017-06-01 RX ADMIN — INSULIN LISPRO 3 UNITS: 100 INJECTION, SOLUTION INTRAVENOUS; SUBCUTANEOUS at 21:19

## 2017-06-01 RX ADMIN — ACETAMINOPHEN 650 MG: 325 TABLET, FILM COATED ORAL at 21:31

## 2017-06-01 RX ADMIN — CALCIUM GLUCONATE 1 G: 94 INJECTION, SOLUTION INTRAVENOUS at 21:30

## 2017-06-01 RX ADMIN — STANDARDIZED SENNA CONCENTRATE AND DOCUSATE SODIUM 2 TABLET: 8.6; 5 TABLET, FILM COATED ORAL at 21:30

## 2017-06-01 RX ADMIN — GABAPENTIN 300 MG: 300 CAPSULE ORAL at 21:30

## 2017-06-01 RX ADMIN — RIVAROXABAN 20 MG: 20 TABLET, FILM COATED ORAL at 21:30

## 2017-06-01 RX ADMIN — SODIUM CHLORIDE 1000 ML: 9 INJECTION, SOLUTION INTRAVENOUS at 19:10

## 2017-06-01 RX ADMIN — RISPERIDONE 4 MG: 2 TABLET, FILM COATED ORAL at 21:36

## 2017-06-01 RX ADMIN — LOVASTATIN 40 MG: 20 TABLET ORAL at 21:30

## 2017-06-01 RX ADMIN — SODIUM CHLORIDE: 9 INJECTION, SOLUTION INTRAVENOUS at 21:21

## 2017-06-01 RX ADMIN — DIPHENHYDRAMINE HCL 25 MG: 25 TABLET ORAL at 21:31

## 2017-06-01 ASSESSMENT — COGNITIVE AND FUNCTIONAL STATUS - GENERAL
DAILY ACTIVITIY SCORE: 24
MOBILITY SCORE: 24
SUGGESTED CMS G CODE MODIFIER MOBILITY: CH
SUGGESTED CMS G CODE MODIFIER DAILY ACTIVITY: CH

## 2017-06-01 ASSESSMENT — PATIENT HEALTH QUESTIONNAIRE - PHQ9
SUM OF ALL RESPONSES TO PHQ9 QUESTIONS 1 AND 2: 0
2. FEELING DOWN, DEPRESSED, IRRITABLE, OR HOPELESS: NOT AT ALL
SUM OF ALL RESPONSES TO PHQ QUESTIONS 1-9: 0
1. LITTLE INTEREST OR PLEASURE IN DOING THINGS: NOT AT ALL

## 2017-06-01 ASSESSMENT — PAIN SCALES - GENERAL
PAINLEVEL_OUTOF10: 0
PAINLEVEL_OUTOF10: 0

## 2017-06-01 ASSESSMENT — ENCOUNTER SYMPTOMS
ABDOMINAL PAIN: 0
LOSS OF CONSCIOUSNESS: 1
DIZZINESS: 1
SHORTNESS OF BREATH: 0
BACK PAIN: 0
DIARRHEA: 0
PALPITATIONS: 1
VOMITING: 0
BLOOD IN STOOL: 0

## 2017-06-01 ASSESSMENT — LIFESTYLE VARIABLES
EVER_SMOKED: NEVER
DO YOU DRINK ALCOHOL: NO
ALCOHOL_USE: NO
DO YOU DRINK ALCOHOL: NO

## 2017-06-01 NOTE — IP AVS SNAPSHOT
" <p align=\"LEFT\"><IMG SRC=\"[email or web address]\" alt=\"Image\" WIDTH=\"50%\" HEIGHT=\"200\" BORDER=\"\"></p>                   Name:Satya Aguilar  Medical Record Number:[id number]  CSN: [id number]    YOB: 1955   Age: 61 y.o.  Sex: male  HT:1.854 m (6' 1\") WT: 94.1 kg (207 lb 7.3 oz)          Admit Date: 6/1/2017     Discharge Date:   Today's Date: 6/3/2017  Attending Doctor:  Mario Alberto Alejandre M.D.                  Allergies:  Review of patient's allergies indicates no known allergies.          Your appointments     Sep 13, 2017  3:15 PM   FOLLOW UP with Loyd Hou M.D.   [facility] (--)    [address]  [facility] [id number] [phone]              Follow-up Information     1. Follow up with Mohsen Tamasaby, M.D. In 1 week.    Specialty:  Family Medicine    Contact information    [address]  [address]  [facility] [address]  [phone]           Medication List      Take these Medications        Instructions    albuterol 108 (90 BASE) MCG/ACT Aers inhalation aerosol    Inhale 2 Puffs by mouth every 1 hour as needed.   Dose:  2 Puff       amiodarone 200 MG Tabs   Commonly known as:  CORDARONE    Take 1 Tab by mouth every day.   Dose:  200 mg       aspirin 81 MG tablet    Take 81 mg by mouth every day.   Dose:  81 mg       gabapentin 300 MG Caps   Commonly known as:  NEURONTIN    Take 300 mg by mouth 3 times a day.   Dose:  300 mg       gemfibrozil 600 MG Tabs   Commonly known as:  LOPID    Take 600 mg by mouth 2 times a day.   Dose:  600 mg       lovastatin 40 MG tablet   Commonly known as:  MEVACOR    Take 1 Tab by mouth every evening.   Dose:  40 mg       metformin 1000 MG tablet   Commonly known as:  GLUCOPHAGE    Take 1,000 mg by mouth 2 times a day, with meals.   Dose:  1000 mg       RISPERDAL 4 MG tablet   Generic drug:  risperidone    Take 4 mg by mouth every evening.   Dose:  4 mg       tiotropium 18 MCG Caps   Commonly known as:  " SPIRIVA    Inhale 1 Cap by mouth every day.   Dose:  18 mcg

## 2017-06-01 NOTE — IP AVS SNAPSHOT
6/3/2017    Satya Aguilar  [address]   [address]  [place] [address]    Dear Satya:    [facility] wants to ensure your discharge home is safe and you or your loved ones have had all of your questions answered regarding your care after you leave the hospital.    Below is a list of resources and contact information should you have any questions regarding your hospital stay, follow-up instructions, or active medical symptoms.    Questions or Concerns Regarding… Contact   Medical Questions Related to Your Discharge  (7 days a week, 8am-5pm) Contact a Nurse Care Coordinator   [phone]   Medical Questions Not Related to Your Discharge  (24 hours a day / 7 days a week)  Contact the Nurse Health Line   [phone]    Medications or Discharge Instructions Refer to your discharge packet   or contact your [facility] Primary Care Provider   [phone]   Follow-up Appointment(s) Schedule your appointment via [employer]   or contact Scheduling [phone]   Billing Review your statement via [employer]  or contact Billing [phone]   Medical Records Review your records via [employer]   or contact Medical Records [phone]     You may receive a telephone call within two days of discharge. This call is to make certain you understand your discharge instructions and have the opportunity to have any questions answered. You can also easily access your medical information, test results and upcoming appointments via the [employer] free online health management tool. You can learn more and sign up at [email or web address]. For assistance setting up your [employer] account, please call [phone].    Once again, we want to ensure your discharge home is safe and that you have a clear understanding of any next steps in your care. If you have any questions or concerns, please do not hesitate to contact us, we are here for you. Thank you for choosing [facility] for your healthcare needs.    Sincerely,    Your [facility] Healthcare Team

## 2017-06-01 NOTE — IP AVS SNAPSHOT
[facility] Access Code: [email or web address]  Expires: 6/27/2017  4:14 AM    Your email address is not on file at [email or web address].  Email Addresses are required for you to sign up for [facility], please contact [phone] to verify your personal information and to provide your email address prior to attempting to register for [facility].    Satya Aguilar  [address] [facility]   [address] [place] [id number]    [facility]  A secure, online tool to manage your health information     [facility]’s [facility]® is a secure, online tool that connects you to your personalized health information from the privacy of your home -- day or night - making it very easy for you to manage your healthcare. Once the activation process is completed, you can even access your medical information using the [facility] janice, which is available for free in the Apple Janice store or Google Play store.     To learn more about [facility], visit [email or web address]    There are two levels of access available (as shown below):   My Chart Features  [facility] Primary Care Doctor [facility]  Specialists [facility]  Urgent  Care Non-[facility] Primary Care Doctor   Email your healthcare team securely and privately 24/7 X X X    Manage appointments: schedule your next appointment; view details of past/upcoming appointments X      Request prescription refills. X      View recent personal medical records, including lab and immunizations X X X X   View health record, including health history, allergies, medications X X X X   Read reports about your outpatient visits, procedures, consult and ER notes X X X X   See your discharge summary, which is a recap of your hospital and/or ER visit that includes your diagnosis, lab results, and care plan X X  X     How to register for [facility]:  Once your e-mail address has been verified, follow the following steps to sign up for [facility].     1. Go to  [email or web address]  2. Click on the Sign Up Now box, which takes you to the New Member Sign Up  page. You will need to provide the following information:  a. Enter your [facility] Access Code exactly as it appears at the top of this page. (You will not need to use this code after you’ve completed the sign-up process. If you do not sign up before the expiration date, you must request a new code.)   b. Enter your date of birth.   c. Enter your home email address.   d. Click Submit, and follow the next screen’s instructions.  3. Create a [employer] ID. This will be your [facility] login ID and cannot be changed, so think of one that is secure and easy to remember.  4. Create a [facility] password. You can change your password at any time.  5. Enter your Password Reset Question and Answer. This can be used at a later time if you forget your password.   6. Enter your e-mail address. This allows you to receive e-mail notifications when new information is available in [facility].  7. Click Sign Up. You can now view your health information.    For assistance activating your [facility] [employer] [facility] account, call [phone]

## 2017-06-02 PROBLEM — E87.5 ACUTE HYPERKALEMIA: Status: RESOLVED | Noted: 2017-06-01 | Resolved: 2017-06-02

## 2017-06-02 PROBLEM — R00.1 BRADYCARDIA: Status: ACTIVE | Noted: 2017-06-02

## 2017-06-02 PROBLEM — R55 SYNCOPE: Status: ACTIVE | Noted: 2017-06-02

## 2017-06-02 PROBLEM — D64.9 ANEMIA: Status: ACTIVE | Noted: 2017-06-02

## 2017-06-02 LAB
ANION GAP SERPL CALC-SCNC: 5 MMOL/L (ref 0–11.9)
BUN SERPL-MCNC: 14 MG/DL (ref 8–22)
CALCIUM SERPL-MCNC: 9 MG/DL (ref 8.5–10.5)
CHLORIDE SERPL-SCNC: 105 MMOL/L (ref 96–112)
CO2 SERPL-SCNC: 28 MMOL/L (ref 20–33)
CREAT SERPL-MCNC: 1.04 MG/DL (ref 0.5–1.4)
GFR SERPL CREATININE-BSD FRML MDRD: >60 ML/MIN/1.73 M 2
GLUCOSE BLD-MCNC: 126 MG/DL (ref 65–99)
GLUCOSE BLD-MCNC: 130 MG/DL (ref 65–99)
GLUCOSE BLD-MCNC: 170 MG/DL (ref 65–99)
GLUCOSE BLD-MCNC: 239 MG/DL (ref 65–99)
GLUCOSE SERPL-MCNC: 216 MG/DL (ref 65–99)
LACTATE BLD-SCNC: 1 MMOL/L (ref 0.5–2)
POTASSIUM SERPL-SCNC: 4.7 MMOL/L (ref 3.6–5.5)
SODIUM SERPL-SCNC: 138 MMOL/L (ref 135–145)
TROPONIN I SERPL-MCNC: <0.01 NG/ML (ref 0–0.04)

## 2017-06-02 PROCEDURE — G8979 MOBILITY GOAL STATUS: HCPCS | Mod: CI

## 2017-06-02 PROCEDURE — 97161 PT EVAL LOW COMPLEX 20 MIN: CPT

## 2017-06-02 PROCEDURE — G8980 MOBILITY D/C STATUS: HCPCS | Mod: CI

## 2017-06-02 PROCEDURE — 700102 HCHG RX REV CODE 250 W/ 637 OVERRIDE(OP): Performed by: HOSPITALIST

## 2017-06-02 PROCEDURE — 770020 HCHG ROOM/CARE - TELE (206)

## 2017-06-02 PROCEDURE — 82962 GLUCOSE BLOOD TEST: CPT | Mod: 91

## 2017-06-02 PROCEDURE — 36415 COLL VENOUS BLD VENIPUNCTURE: CPT

## 2017-06-02 PROCEDURE — A9270 NON-COVERED ITEM OR SERVICE: HCPCS | Performed by: HOSPITALIST

## 2017-06-02 PROCEDURE — 83605 ASSAY OF LACTIC ACID: CPT

## 2017-06-02 PROCEDURE — 99232 SBSQ HOSP IP/OBS MODERATE 35: CPT | Performed by: INTERNAL MEDICINE

## 2017-06-02 PROCEDURE — 700105 HCHG RX REV CODE 258: Performed by: HOSPITALIST

## 2017-06-02 PROCEDURE — G8978 MOBILITY CURRENT STATUS: HCPCS | Mod: CI

## 2017-06-02 RX ORDER — AMIODARONE HYDROCHLORIDE 200 MG/1
200 TABLET ORAL DAILY
Status: DISCONTINUED | OUTPATIENT
Start: 2017-06-03 | End: 2017-06-03 | Stop reason: HOSPADM

## 2017-06-02 RX ADMIN — INSULIN LISPRO 2 UNITS: 100 INJECTION, SOLUTION INTRAVENOUS; SUBCUTANEOUS at 06:07

## 2017-06-02 RX ADMIN — GEMFIBROZIL 600 MG: 600 TABLET ORAL at 20:25

## 2017-06-02 RX ADMIN — TIOTROPIUM BROMIDE 1 CAPSULE: 18 CAPSULE ORAL; RESPIRATORY (INHALATION) at 08:41

## 2017-06-02 RX ADMIN — GABAPENTIN 300 MG: 300 CAPSULE ORAL at 06:05

## 2017-06-02 RX ADMIN — ASPIRIN 81 MG: 81 TABLET ORAL at 08:38

## 2017-06-02 RX ADMIN — RIVAROXABAN 20 MG: 20 TABLET, FILM COATED ORAL at 17:18

## 2017-06-02 RX ADMIN — GABAPENTIN 300 MG: 300 CAPSULE ORAL at 20:25

## 2017-06-02 RX ADMIN — STANDARDIZED SENNA CONCENTRATE AND DOCUSATE SODIUM 2 TABLET: 8.6; 5 TABLET, FILM COATED ORAL at 20:25

## 2017-06-02 RX ADMIN — INSULIN LISPRO 3 UNITS: 100 INJECTION, SOLUTION INTRAVENOUS; SUBCUTANEOUS at 11:20

## 2017-06-02 RX ADMIN — RISPERIDONE 4 MG: 2 TABLET, FILM COATED ORAL at 21:52

## 2017-06-02 RX ADMIN — ACETAMINOPHEN 650 MG: 325 TABLET, FILM COATED ORAL at 06:05

## 2017-06-02 RX ADMIN — GABAPENTIN 300 MG: 300 CAPSULE ORAL at 15:17

## 2017-06-02 RX ADMIN — DIPHENHYDRAMINE HCL 25 MG: 25 TABLET ORAL at 20:25

## 2017-06-02 RX ADMIN — SODIUM CHLORIDE: 9 INJECTION, SOLUTION INTRAVENOUS at 06:11

## 2017-06-02 RX ADMIN — ACETAMINOPHEN 650 MG: 325 TABLET, FILM COATED ORAL at 20:24

## 2017-06-02 RX ADMIN — SODIUM CHLORIDE: 9 INJECTION, SOLUTION INTRAVENOUS at 18:58

## 2017-06-02 RX ADMIN — LOVASTATIN 40 MG: 20 TABLET ORAL at 20:25

## 2017-06-02 RX ADMIN — CITALOPRAM HYDROBROMIDE 10 MG: 20 TABLET ORAL at 08:39

## 2017-06-02 ASSESSMENT — COGNITIVE AND FUNCTIONAL STATUS - GENERAL
MOBILITY SCORE: 24
SUGGESTED CMS G CODE MODIFIER MOBILITY: CH

## 2017-06-02 ASSESSMENT — GAIT ASSESSMENTS
ASSISTIVE DEVICE: FRONT WHEEL WALKER
GAIT LEVEL OF ASSIST: SUPERVISED
DISTANCE (FEET): 300

## 2017-06-02 ASSESSMENT — ENCOUNTER SYMPTOMS
HEARTBURN: 0
DEPRESSION: 0
BLURRED VISION: 0
COUGH: 0
FEVER: 0
HEADACHES: 0
DIZZINESS: 0
MYALGIAS: 0

## 2017-06-02 ASSESSMENT — PAIN SCALES - GENERAL
PAINLEVEL_OUTOF10: 0
PAINLEVEL_OUTOF10: 0
PAINLEVEL_OUTOF10: 2

## 2017-06-02 ASSESSMENT — LIFESTYLE VARIABLES: DO YOU DRINK ALCOHOL: NO

## 2017-06-02 NOTE — ED PROVIDER NOTES
"ED Provider Note    Scribed for Marc Minaya M.D. by Sreedhar Cook. 6/1/2017, 6:01 PM.    Primary care provider: Mohsen Tamasaby, M.D.  Means of arrival: EMS  History obtained from: Patient  History limited by: None     CHIEF COMPLAINT  Chief Complaint   Patient presents with   • Chest Pain     feels like it is \"racing\".        HPI  Satya Aguilar is a 61 y.o. male who presents to the Emergency Department for evaluation after a possible syncopal episode just prior to arrival. Patient states he ate a hamburger earlier today. Afterwards, he was walking down a hallway and suddenly was unable to catch up with his family. He became dizzy and suddenly noticed that security had come to him and called the paramedics. Patient reports associated palpitations of fast heart. He does not report any exacerbating or alleviating factors to the palpitations. He denies bloody stools, shortness of breath, vomiting, diarrhea, chest pain, back pain, arm pain, or abdominal pain. Patient reports a similar episode on May 3, 2017 in which he \"collapsed in his apartment. At that time, he hit his head on a table. He experienced palpitations of fast heart rate as well. Patient reports smoking marijuana.    REVIEW OF SYSTEMS  Review of Systems   Respiratory: Negative for shortness of breath.    Cardiovascular: Positive for palpitations. Negative for chest pain.   Gastrointestinal: Negative for vomiting, abdominal pain, diarrhea and blood in stool.   Musculoskeletal: Negative for back pain.        Negative arm pain   Neurological: Positive for dizziness and loss of consciousness (subjective).   All other systems reviewed and are negative.  C    PAST MEDICAL HISTORY   has a past medical history of Hypertension; Diabetes (CMS-HCC); Asthma; High cholesterol; Paroxysmal atrial fibrillation (CMS-HCC); and COPD (chronic obstructive pulmonary disease) (CMS-HCC).    SURGICAL HISTORY  patient denies any surgical history    SOCIAL " "HISTORY  Social History   Substance Use Topics   • Smoking status: Never Smoker    • Smokeless tobacco: Never Used   • Alcohol Use: Yes      Comment: occ      History   Drug Use   • Yes   • Special: Inhaled     Comment: marijuana daily       FAMILY HISTORY  History reviewed. No pertinent family history.    CURRENT MEDICATIONS  No current facility-administered medications on file prior to encounter.     Current Outpatient Prescriptions on File Prior to Encounter   Medication Sig Dispense Refill   • gabapentin (NEURONTIN) 300 MG Cap Take 300 mg by mouth 3 times a day.     • amiodarone (CORDARONE) 200 MG Tab Take 1 Tab by mouth every day. 30 Tab 11   • metoprolol (LOPRESSOR) 25 MG Tab Take 0.5 Tabs by mouth 2 Times a Day. 30 Tab 11   • rivaroxaban (XARELTO) 20 MG Tab tablet Take 1 Tab by mouth with dinner. 30 Tab 11   • lovastatin (MEVACOR) 40 MG tablet Take 1 Tab by mouth every evening. 30 Tab 11   • albuterol 108 (90 BASE) MCG/ACT Aero Soln inhalation aerosol Inhale 2 Puffs by mouth every 1 hour as needed. 8.5 g 3   • gemfibrozil (LOPID) 600 MG Tab Take 600 mg by mouth 2 times a day.     • aspirin 81 MG tablet Take 81 mg by mouth every day.     • tiotropium (SPIRIVA) 18 MCG Cap Inhale 1 Cap by mouth every day. 30 Cap 3   • citalopram (CELEXA) 10 MG tablet Take 10 mg by mouth every day.     • glyBURIDE (DIABETA) 2.5 MG Tab Take 2.5 mg by mouth every morning with breakfast.     • metformin (GLUCOPHAGE) 1000 MG tablet Take 1,000 mg by mouth 2 times a day, with meals.        ALLERGIES  No Known Allergies    PHYSICAL EXAM  VITAL SIGNS: /63 mmHg  Pulse 63  Temp(Src) 36.6 °C (97.9 °F) (Temporal)  Resp 20  Ht 1.854 m (6' 1\")  Wt 90.266 kg (199 lb)  BMI 26.26 kg/m2  SpO2 95%  Constitutional:  No acute distress  HENT: Slightly dry mucous membranes  Eyes:  No conjunctivitis or icterus  Neck:  trachea is midline, no palpable thyroid  Lymphatic:  No cervical lymphadenopathy  Cardiovascular:  Regular rate and rhythm, " no murmurs  Thorax & Lungs:  Normal breath sounds, no rhonchi  Abdomen:  Soft, Non-tender  Skin:. Cool, clammy  Back:  Non-tender, no CVA tenderness  Extremities:   no edema  Vascular:  symmetric radial pulse  Neurologic:  Normal gross motor    LABS  Labs Reviewed   CBC WITH DIFFERENTIAL - Abnormal; Notable for the following:     RBC 4.21 (*)     Hemoglobin 11.9 (*)     Hematocrit 37.8 (*)     MCHC 31.5 (*)     Lymphocytes 21.00 (*)     Eos (Absolute) 0.64 (*)     All other components within normal limits   COMP METABOLIC PANEL - Abnormal; Notable for the following:     Potassium 5.7 (*)     Glucose 206 (*)     AST(SGOT) 9 (*)     All other components within normal limits   LACTIC ACID - Abnormal; Notable for the following:     Lactic Acid 2.4 (*)     All other components within normal limits   TROPONIN   BTYPE NATRIURETIC PEPTIDE   ESTIMATED GFR   POTASSIUM SERUM (K)   TSH WITH REFLEX TO FT4     All labs reviewed by me.    EKG Interpretation  Interpreted by me  Rhythm: bradycardia  Rate: 56  Axis: normal  Ectopy: none  Conduction: normal  ST Segments: no acute change  T Waves: no acute change  Q Waves: none  Clinical Impression: no acute changes and normal EKG     RADIOLOGY  DX-CHEST-PORTABLE (1 VIEW)   Final Result      Enlarged cardiac silhouette without evidence of acute disease.        The radiologist's interpretation of all radiological studies have been reviewed by me.    COURSE & MEDICAL DECISION MAKING  Pertinent Labs & Imaging studies reviewed. (See chart for details)    6:01 PM - Patient seen and examined at bedside. Ordered DX chest, CBC, CMP, troponin, BNP, lactic acid, EKG to evaluate his symptoms. The differential diagnoses include but are not limited to: MI, dysrhythmia     7:32 PM Patient reevaluated at bedside. Discussed lab results as seen above which shows abnormal lactic acid. Lactic acid is 2.4.    7:34 PM Paged hospitalist.    7:40 PM - I discussed the patient's case and the above findings  with Dr. Gaspar (hospitalist) who will admit the patient.     Medical Decision Making:  Patient had a presentation today that occurred right after eating more A was fatigued while we couldn't walk couldn't stand had to sit down was brought in by EMS he also had palpitations. In the record and stated he consented chest pain but my review of systems is no chest pain. I did review his chart he had a similar presentation. There is some concern about dysrhythmia. Patient does have a lactic acidosis of 2.4. I am somewhat concerned of lactic acidosis weakness that's not diagnosed. He's been given a liter of IV fluid with some improvement he is being admitted to the hospitalist.    DISPOSITION:  Patient will be admitted to Dr. Gaspar in guarded condition.     FINAL IMPRESSION  1. Weakness    2. Palpitations    3. Lactic acidosis          Sreedhar RODRIGUEZ (Kianibe), am scribing for, and in the presence of, Marc Minaya M.D..    Electronically signed by: Sreedhar Cook (Abdon), 6/1/2017    IMarc M.D. personally performed the services described in this documentation, as scribed by Sreedahr Cook in my presence, and it is both accurate and complete.    The note accurately reflects work and decisions made by me.  Marc Minaya  6/1/2017  8:10 PM

## 2017-06-02 NOTE — THERAPY
"Physical Therapy Evaluation completed.   Bed Mobility:  Supine to Sit: Supervised  Transfers: Sit to Stand: Supervised  Gait: Level Of Assist: Supervised with Front-Wheel Walker       Plan of Care:d Patient with no further skilled PT needs in the acute care setting at this time  Discharge Recommendations: Equipment: No Equipment Needed.  See \"Rehab Therapy-Acute\" Patient Summary Report for complete documentation.     "

## 2017-06-02 NOTE — H&P
CHIEF COMPLAINT:  Syncopal episode.    PRIMARY CARE PROVIDER:  Mohsen Tamasaby, MD    PRIMARY CARDIOLOGIST:  Dr. Loyd Hou.    HISTORY OF PRESENT ILLNESS:  This is a 61-year-old man who has had recurrent   episodes of syncope.  He was last admitted 5/2/2007 with another episode of   syncope, dehydration, septic shock due to gastroenteritis and atrial   fibrillation.  He also had acute kidney failure at that time.  The patient was   treated for all these conditions and scheduled for followup with cardiology   and he did follow up with them actually yesterday.  He stated that the   appointment with Sp[facility].  They did not change any of his medications because he   seemed to be doing well and did not have any syncopal episodes in the   meanwhile and no dizziness.  Today, the patient ate a hamburger, then was   walking down the hallway and suddenly was unable to catch up with his family.    He then became dizzy.  He does not remember anything and awakened with   security guards at the place he was eating around him and they called the   paramedics.  He did have some palpitations at that time.  He has not had any   shortness of breath.  He says he feels fine now.  He does have a history of   bipolar disorder and schizophrenia, which he says is well controlled and he   does smoke marijuana recreationally.  He does not think that he hurt himself   at all today.  He denies any chest pain.  He is not having abdominal pain or   diarrhea.  He says he feels absolutely fine now and is sitting up eating a   sandwich.    REVIEW OF SYSTEMS:  A complete review of systems was performed and other than   what is stated in history present illness is otherwise negative.    PAST MEDICAL HISTORY:  Hypertension, diabetes, asthma, hyperlipidemia,   paroxysmal atrial fibrillation, COPD, bipolar disorder, schizophrenia.    PAST SURGICAL HISTORY:  Right knee surgery in 2010.    FAMILY HISTORY:  Positive for prostate cancer in his  father.    ALLERGIES:  He has no known medication allergies.    SOCIAL HISTORY:  He denies smoking.  He says he smokes marijuana   recreationally.  He has occasional alcoholic drink, but says he has never had   any issues with abuse of alcohol.    CURRENT MEDICATIONS:  Albuterol 1-2 puffs every hour as needed for shortness   of breath or wheezing.    MEDICATIONS:  Amiodarone 200 mg daily, aspirin 81 mg daily, citalopram 10 mg   daily, gabapentin 300 mg 3 times daily, gemfibrozil 600 mg twice daily,   glyburide 2.5 mg daily with breakfast, lovastatin 40 mg daily, metformin 1000   mg b.i.d. with meals, metoprolol 12.5 mg b.i.d., risperidone 4 mg daily,   rivaroxaban 20 mg daily, and Tiotropium 18 mcg inhaled daily.    PHYSICAL EXAMINATION:  VITAL SIGNS:  Temperature 97.9 degrees, heart rate 64, respirations 19, pulse   oximetry 98% on room air, blood pressure 103/63.  GENERAL:  This is a well-developed, well-nourished man.  He is awake and   alert, oriented x3, pleasant, cooperative with the examination.  HEENT:  Normocephalic, atraumatic.  Pupils are equal and reactive.  Sclerae   are nonicteric.  Oropharynx is clear.  He is edentulous.  Mucous membranes are   moist.  NECK:  Supple, without lymphadenopathy.  No jugular venous distension is   present.  Trachea is midline without stridor.  CHEST:  Clear to auscultation bilaterally without rales, rhonchi, or wheezes.    No tachypnea or accessory muscle use is noted.  CARDIOVASCULAR:  Regular rate.  I do not detect a murmur, rub or gallop.  No   ventricular heave is present.  Radial pulses normal and symmetric.  Capillary   refill is within normal limits.  ABDOMEN:  Slightly obese, soft, not tender, not distended.  Normoactive bowel   sounds are present.  No rebound or guarding is noted.  MUSCULOSKELETAL:  No cyanosis, clubbing or edema of the extremities is seen.    No bony and musculoskeletal deformities or injuries are noted.  SKIN:  Warm and dry, normal color and  temperature.  No diaphoresis.  No   rashes, ecchymosis, or petechiae are noted.  NEUROLOGIC:  He is slightly flat affect, but is pleasant, very cooperative.    He is oriented x3, moving all extremities, and his cranial nerves are intact.    LABORATORY DATA:  White blood cell count 9.4, hemoglobin 11.9, hematocrit is   37.8, platelets 252.  Sodium 137, potassium 5.7, chloride 103, bicarbonate 25,   glucose is 206, BUN 14, creatinine 1.16, calcium 9.5, AST is 9, ALT is 9,   alkaline phosphatase 75, total bilirubin 0.3, albumin is 4.3, lactic acid is   2.4, troponin less than 0.01.  BNP is 11.  Chest x-ray, no infiltrates,   effusions, or edema is seen.  EKG shows normal sinus rhythm, no peaked T waves   are present to indicate changes associated with hyperkalemia and the EKG does   not appear different from the one done on 05/28/2017.  He does have some mild   bradycardia at a rate of 56.    IMPRESSION:  1.  Recurrent syncope.  2.  Paroxysmal atrial fibrillation by history, currently in sinus rhythm.  3.  Hyperkalemia, may be spurious secondary to error in lab draw.  4.  Type 2 diabetes.  5.  Hypertension.  6.  Hyperlipidemia.  7.  Mild bradycardia, probably secondary to metoprolol.    PLAN:  He is going to be admitted to telemetry.  I am going to repeat the   potassium measurements right now.  We will get a cardiology consultation for   [facility] in the morning.  He may need an electrophysiology study to   ascertain the cause of his syncope.  In the meanwhile, I am going to keep him   off his metoprolol as he is slightly bradycardic, although this would not seem   to be enough to make him passed out like that given that he is only in the   50s.  Again, we will continue to watch him on telemetry.  This patient has a   life threatening condition, this will require at least 2 midnights stay to   treat.       ____________________________________     MD RICHARD RICK / ALEX    DD:  06/01/2017  20:34:22  DT:  06/01/2017 21:38:45    D#:  [id number]  Job#:  [id number]

## 2017-06-02 NOTE — PROGRESS NOTES
Hospital Medicine Progress Note, Adult, Complex               Author: Mario Alberto Alejandre Date & Time created: 6/2/2017  3:53 PM     Interval History:  Hx reviewed; probable syncope witnessed by patient's wife.  Was feeling tired/dizzy prior to syncope.  No chest pain/sob.  Feels better today.  Eating ok.    Review of Systems:  Review of Systems   Constitutional: Negative for fever.   Eyes: Negative for blurred vision.   Respiratory: Negative for cough.    Cardiovascular: Negative for chest pain.   Gastrointestinal: Negative for heartburn.   Genitourinary: Negative for dysuria.   Musculoskeletal: Negative for myalgias.   Skin: Negative for rash.   Neurological: Negative for dizziness and headaches.   Psychiatric/Behavioral: Negative for depression.       Physical Exam:  Physical Exam   Constitutional: He is oriented to person, place, and time. No distress.   HENT:   Head: Normocephalic.   Eyes: EOM are normal.   Neck: Neck supple.   Cardiovascular: Regular rhythm.    bradycardic   Pulmonary/Chest: Effort normal and breath sounds normal.   Abdominal: Soft. Bowel sounds are normal. He exhibits no distension. There is no tenderness.   Musculoskeletal: He exhibits no edema.   Neurological: He is alert and oriented to person, place, and time.   Skin: Skin is warm.   Psychiatric: His behavior is normal.       Labs:        Invalid input(s): [id number]  Recent Labs      06/01/17   1810  06/01/17 2333   TROPONINI  <0.01  <0.01   BNPBTYPENAT  11   --      Recent Labs      06/01/17 1810 06/01/17   2333   SODIUM  137  138   POTASSIUM  5.7*  4.7   CHLORIDE  103  105   CO2  25  28   BUN  14  14   CREATININE  1.16  1.04   CALCIUM  9.5  9.0     Recent Labs      06/01/17   1810 06/01/17   2333   ALTSGPT  9   --    ASTSGOT  9*   --    ALKPHOSPHAT  75   --    TBILIRUBIN  0.3   --    GLUCOSE  206*  216*     Recent Labs      06/01/17   1810 06/01/17   2333   RBC  4.21*  3.75*   HEMOGLOBIN  11.9*  10.6*   HEMATOCRIT  37.8*  34.2*    PLATELETCT  252  232     Recent Labs      17   1810  17   2333   WBC  9.4  8.8   NEUTSPOLYS  66.70  54.00   LYMPHOCYTES  21.00*  31.80   MONOCYTES  4.90  5.80   EOSINOPHILS  6.80  7.60*   BASOPHILS  0.40  0.60   ASTSGOT  9*   --    ALTSGPT  9   --    ALKPHOSPHAT  75   --    TBILIRUBIN  0.3   --            Hemodynamics:  Temp (24hrs), Av.2 °C (97.2 °F), Min:36 °C (96.8 °F), Max:36.6 °C (97.9 °F)  Temperature: 36.6 °C (97.8 °F)  Pulse  Av.9  Min: 43  Max: 129Heart Rate (Monitored): 63  Blood Pressure: 113/60 mmHg, NIBP: 107/50 mmHg     Respiratory:    Respiration: 18, Pulse Oximetry: 92 %     Work Of Breathing / Effort: Mild  RUL Breath Sounds: Clear, RML Breath Sounds: Clear, RLL Breath Sounds: Clear, FERMIN Breath Sounds: Clear, LLL Breath Sounds: Clear  Fluids:    Intake/Output Summary (Last 24 hours) at 17 1553  Last data filed at 17 0600   Gross per 24 hour   Intake    900 ml   Output    100 ml   Net    800 ml     Weight: 90.1 kg (198 lb 10.2 oz)  GI/Nutrition:  Orders Placed This Encounter   Procedures   • Diet Order     Standing Status: Standing      Number of Occurrences: 1      Standing Expiration Date:      Order Specific Question:  Diet:     Answer:  Diabetic [3]     Medical Decision Making, by Problem:  Active Hospital Problems    Diagnosis   • Syncope [R55], unclear cause although current bradycardia/mild hypotension may have contributed; will adjust patient's meds and monitor overnight; if improved, can go home tomorrow.  Of note, Amiodarone and Citalopram together can prolong QT interval.  Will dc Citalopram.   • Bradycardia [R00.1] as above; med induced vs other cause; monitor post med adjustment   • DM2 (diabetes mellitus, type 2) (CMS-HCC) [E11.9] stable; cont rx   • COPD (chronic obstructive pulmonary disease) (CMS-HCC) [J44.9] stable; not on home o2; monitor     • Anemia [D64.9] f/u labs       Core Measures

## 2017-06-02 NOTE — RESPIRATORY CARE
COPD EDUCATION by COPD CLINICAL EDUCATOR  6/2/2017 at 6:23 AM by Julia Jones     Patient reviewed by COPD education team. Patient does not qualify for COPD program.

## 2017-06-02 NOTE — CONSULTS
Cardiology consultation    Patient ID:  Satya Aguilar  [id number]  61 y.o. male  1955     asking for the consultation: Dr. Alejandre hospitalist  Indication for consultation: Syncope    History of present illness:    The patient is somewhat a poor historian. He is 61 years old, he and his disabled wife were smoking marijuana in their hotel suite yesterday. He admits to being dehydrated. They got hungry after this and he got in his usual walker and [place] the [place]. He uses a walker because he often gets tired. He does not think he has Parkinson's disease, but has trouble walking and often shuffles his feet. He felt dizzy and lightheaded and thought this was a normal thing that he felt after smoking marijuana.    The temperature is more than 80°. He was thirsty  He had no palpitations, no chest discomfort. He felt lightheaded and dizzy and it was more than 500 feet in nearest bench    They sat down, he is not sure if he actually passed out. They're walking back after eating hamburger. He did not feel nauseated nor did he have chest pain. He told me he had no recent heart symptoms. His wife asked him to hurry up. A bystander called emergency medical services.    The emergency medical responders in their notes said that he was alert and oriented, they noticed no evidence of witnessed passing out or rest. On the monitor he was in sinus tachycardia, by the time he arrived to the emergency room his heart rate and blood pressure was within normal limits. He offered no complaints. Was admitted to the floor where he has been in sinus rhythm all night        Past Medical History:  has a past medical history of Hypertension; Diabetes (CMS-HCC); Asthma; High cholesterol; Paroxysmal atrial fibrillation (CMS-HCC); and COPD (chronic obstructive pulmonary disease) (CMS-HCC).  Past Surgical History:  has no past surgical history on file.  Past Social History:  reports that he has never smoked. He has never used  smokeless tobacco. He reports that he drinks alcohol. He reports that he uses illicit drugs (Inhaled).  Past Family History: History reviewed. No pertinent family history.  Allergies: Review of patient's allergies indicates no known allergies.    Current Medications:  Prior to Admission medications    Medication Sig Start Date End Date Taking? Authorizing Provider   risperidone (RISPERDAL) 4 MG tablet Take 4 mg by mouth every evening.   Yes Not In System Provider   gabapentin (NEURONTIN) 300 MG Cap Take 300 mg by mouth 3 times a day.    Not In System Provider   amiodarone (CORDARONE) 200 MG Tab Take 1 Tab by mouth every day. 5/31/17   ASHLEY Liu.P.N.   metoprolol (LOPRESSOR) 25 MG Tab Take 0.5 Tabs by mouth 2 Times a Day. 5/31/17   ASHLEY Liu.P.N.   rivaroxaban (XARELTO) 20 MG Tab tablet Take 1 Tab by mouth with dinner. 5/31/17   ASHLEY Liu.P.N.   lovastatin (MEVACOR) 40 MG tablet Take 1 Tab by mouth every evening. 5/31/17   ASHLEY Liu.P.N.   albuterol 108 (90 BASE) MCG/ACT Aero Soln inhalation aerosol Inhale 2 Puffs by mouth every 1 hour as needed. 5/8/17   Nathalie Licona M.D.   gemfibrozil (LOPID) 600 MG Tab Take 600 mg by mouth 2 times a day.    Not In System Provider   aspirin 81 MG tablet Take 81 mg by mouth every day.    Not In System Provider   tiotropium (SPIRIVA) 18 MCG Cap Inhale 1 Cap by mouth every day. 2/22/17   Kayla Reynolds M.D.   citalopram (CELEXA) 10 MG tablet Take 10 mg by mouth every day.    Not In System Provider   glyBURIDE (DIABETA) 2.5 MG Tab Take 2.5 mg by mouth every morning with breakfast.    Not In System Provider   metformin (GLUCOPHAGE) 1000 MG tablet Take 1,000 mg by mouth 2 times a day, with meals.    Not In System Provider       Review of Systems:  Review of Systems   Constitutional: Positive for malaise/fatigue. Negative for fever, weight loss and diaphoresis.   HENT: Negative for hearing loss.    Eyes: Negative.    Respiratory: Negative  "for cough, sputum production and shortness of breath.    Cardiovascular: Negative for chest pain, palpitations, leg swelling and PND.   Gastrointestinal: Negative for heartburn, nausea and abdominal pain.   Musculoskeletal: Positive for back pain. Negative for myalgias and joint pain.   Skin: Negative for rash.   Neurological: Positive for dizziness. Negative for tremors, sensory change, speech change, focal weakness, weakness and headaches.        Long standing left hand tremor   Endo/Heme/Allergies: Does not bruise/bleed easily.   Psychiatric/Behavioral: Negative for depression and memory loss. The patient is not nervous/anxious and does not have insomnia.    All other systems reviewed and are negative.    Blood pressure 94/48, pulse 49, temperature 36.2 °C (97.1 °F), temperature source Temporal, resp. rate 20, height 1.854 m (6' 1\"), weight 90.1 kg (198 lb 10.2 oz), SpO2 97 %.    Physical Examination:    Constitutional: He is oriented to person, place, and time. He appears well-nourished.   Chronically ill, joking with the examiner, 2 L of oxygen. Notable pill-rolling tremor in the left hand, resting. Older than stated age, poor dentition   HENT:   Head: Normocephalic and atraumatic.   Neck: No JVD present. No thyromegaly present.   Cardiovascular: Normal rate, regular rhythm and intact distal pulses.    No murmur heard.  Pulmonary/Chest: Breath sounds normal. He has no wheezes.   Abdominal: Bowel sounds are normal. He exhibits no distension. There is no tenderness.   Musculoskeletal: He exhibits no edema or tenderness.   Neurological: He is alert and oriented to person, place, and time. No cranial nerve deficit.   Skin: Skin is warm and dry. No rash noted.   Psychiatric: He has a normal mood and affect. His behavior is normal.     Data:  Twelve-lead EKG reviewed by me and compared to priors: Normal QRS and QT intervals. Normal sinus rhythm. No bradycardia on the monitor overnight, no fibrillation    Chest x-ray " reviewed by me, normal cardiac silhouette,  no infiltrates or effusion    White count 8.8 and normal differential hemoglobin 10.6, unchanged from priors earlier this month MCV 91 platelets 232  Glucose 216 troponin negative ×3 BNP 11  Lactic acid 2.4 microbiology still pending  Echocardiogram from February reviewed by me as well as the nuclear test done last fall, both are normal with preserved left anterior ejection fraction      Impression:  Volume patient  Dehydration  Presyncope, in the setting of marijuana and overheating with underlying profound debility  Possible autonomic dysregulation/Parkinson's symptoms    Plan:    I would recommend no further workup at this point. This does not appear to be cardiac in nature. He does not have ischemic symptoms  He certainly does not warrant a pacemaker  He can stay on amiodarone as there are no toxic effect noted on his lab work or EKG  Aspirin, at this point he is very debilitated and at high risk for falling, I would not start him on systemic anticoagulation  He has a follow-up arranged in our office towards the end of the summer, don't hesitate to call prior to that with any concerns    Thank you kindly

## 2017-06-02 NOTE — PROGRESS NOTES
Pt admitted from ER via cart.  Assumed pt care, assessment complete.  Oriented to room/controls, needs met at this time, bed alarm armed, belongings and call light in reach treaded socks on, bed in low position.  Pt personal walker in room, telemetry and O2 on./

## 2017-06-03 VITALS
HEIGHT: 73 IN | HEART RATE: 55 BPM | WEIGHT: 207.45 LBS | RESPIRATION RATE: 18 BRPM | OXYGEN SATURATION: 96 % | TEMPERATURE: 96.5 F | SYSTOLIC BLOOD PRESSURE: 115 MMHG | DIASTOLIC BLOOD PRESSURE: 63 MMHG | BODY MASS INDEX: 27.49 KG/M2

## 2017-06-03 LAB
ANION GAP SERPL CALC-SCNC: 6 MMOL/L (ref 0–11.9)
BASOPHILS # BLD AUTO: 0.6 % (ref 0–1.8)
BASOPHILS # BLD: 0.06 K/UL (ref 0–0.12)
BUN SERPL-MCNC: 16 MG/DL (ref 8–22)
CALCIUM SERPL-MCNC: 8.6 MG/DL (ref 8.5–10.5)
CHLORIDE SERPL-SCNC: 105 MMOL/L (ref 96–112)
CO2 SERPL-SCNC: 29 MMOL/L (ref 20–33)
CREAT SERPL-MCNC: 1 MG/DL (ref 0.5–1.4)
EOSINOPHIL # BLD AUTO: 0.97 K/UL (ref 0–0.51)
EOSINOPHIL NFR BLD: 9.7 % (ref 0–6.9)
ERYTHROCYTE [DISTWIDTH] IN BLOOD BY AUTOMATED COUNT: 43.1 FL (ref 35.9–50)
GFR SERPL CREATININE-BSD FRML MDRD: >60 ML/MIN/1.73 M 2
GLUCOSE BLD-MCNC: 131 MG/DL (ref 65–99)
GLUCOSE SERPL-MCNC: 126 MG/DL (ref 65–99)
HCT VFR BLD AUTO: 35.8 % (ref 42–52)
HGB BLD-MCNC: 11 G/DL (ref 14–18)
IMM GRANULOCYTES # BLD AUTO: 0.04 K/UL (ref 0–0.11)
IMM GRANULOCYTES NFR BLD AUTO: 0.4 % (ref 0–0.9)
LYMPHOCYTES # BLD AUTO: 2.84 K/UL (ref 1–4.8)
LYMPHOCYTES NFR BLD: 28.3 % (ref 22–41)
MCH RBC QN AUTO: 28.1 PG (ref 27–33)
MCHC RBC AUTO-ENTMCNC: 30.7 G/DL (ref 33.7–35.3)
MCV RBC AUTO: 91.3 FL (ref 81.4–97.8)
MONOCYTES # BLD AUTO: 0.46 K/UL (ref 0–0.85)
MONOCYTES NFR BLD AUTO: 4.6 % (ref 0–13.4)
NEUTROPHILS # BLD AUTO: 5.68 K/UL (ref 1.82–7.42)
NEUTROPHILS NFR BLD: 56.4 % (ref 44–72)
NRBC # BLD AUTO: 0 K/UL
NRBC BLD AUTO-RTO: 0 /100 WBC
PLATELET # BLD AUTO: 216 K/UL (ref 164–446)
PMV BLD AUTO: 11 FL (ref 9–12.9)
POTASSIUM SERPL-SCNC: 4.4 MMOL/L (ref 3.6–5.5)
RBC # BLD AUTO: 3.92 M/UL (ref 4.7–6.1)
SODIUM SERPL-SCNC: 140 MMOL/L (ref 135–145)
WBC # BLD AUTO: 10.1 K/UL (ref 4.8–10.8)

## 2017-06-03 PROCEDURE — 80048 BASIC METABOLIC PNL TOTAL CA: CPT

## 2017-06-03 PROCEDURE — 700102 HCHG RX REV CODE 250 W/ 637 OVERRIDE(OP): Performed by: HOSPITALIST

## 2017-06-03 PROCEDURE — 700102 HCHG RX REV CODE 250 W/ 637 OVERRIDE(OP): Performed by: INTERNAL MEDICINE

## 2017-06-03 PROCEDURE — 99239 HOSP IP/OBS DSCHRG MGMT >30: CPT | Performed by: INTERNAL MEDICINE

## 2017-06-03 PROCEDURE — A9270 NON-COVERED ITEM OR SERVICE: HCPCS | Performed by: HOSPITALIST

## 2017-06-03 PROCEDURE — 36415 COLL VENOUS BLD VENIPUNCTURE: CPT

## 2017-06-03 PROCEDURE — 82962 GLUCOSE BLOOD TEST: CPT

## 2017-06-03 PROCEDURE — A9270 NON-COVERED ITEM OR SERVICE: HCPCS | Performed by: INTERNAL MEDICINE

## 2017-06-03 PROCEDURE — 85025 COMPLETE CBC W/AUTO DIFF WBC: CPT

## 2017-06-03 RX ADMIN — GABAPENTIN 300 MG: 300 CAPSULE ORAL at 06:46

## 2017-06-03 RX ADMIN — ASPIRIN 81 MG: 81 TABLET ORAL at 09:39

## 2017-06-03 RX ADMIN — GEMFIBROZIL 600 MG: 600 TABLET ORAL at 09:39

## 2017-06-03 RX ADMIN — STANDARDIZED SENNA CONCENTRATE AND DOCUSATE SODIUM 2 TABLET: 8.6; 5 TABLET, FILM COATED ORAL at 09:39

## 2017-06-03 RX ADMIN — TIOTROPIUM BROMIDE 1 CAPSULE: 18 CAPSULE ORAL; RESPIRATORY (INHALATION) at 09:39

## 2017-06-03 RX ADMIN — AMIODARONE HYDROCHLORIDE 200 MG: 200 TABLET ORAL at 09:39

## 2017-06-03 ASSESSMENT — PAIN SCALES - GENERAL: PAINLEVEL_OUTOF10: 2

## 2017-06-03 ASSESSMENT — LIFESTYLE VARIABLES: EVER_SMOKED: NEVER

## 2017-06-03 NOTE — CARE PLAN
Problem: Safety  Goal: Will remain free from injury  Outcome: PROGRESSING AS EXPECTED  Bed locked in low position, call light in reach, treaded socks on.        Problem: Knowledge Deficit  Goal: Knowledge of disease process/condition, treatment plan, diagnostic tests, and medications will improve  Outcome: PROGRESSING AS EXPECTED  POC discussed, pt verbalizes understanding.

## 2017-06-03 NOTE — PROGRESS NOTES
Received report from night RN Tangela . Resumed care. Patient is A+Ox4. Bed alarm is in use. Patient is educated on the use of the call light and calls appropritaly. Bed is in the lowest position. Tele monitor is on the patient and heart rhythm  was confirmed by assessment of monitor reading. Patient is resting comfortably in bed and has no complaint of pain at this time.

## 2017-06-03 NOTE — PROGRESS NOTES
12 Hr chart check.  Telemetry thru the noc .14/.08/.40 SB 54-63.  ivf continue, slept well, Sao2 >95% on 2 lpm

## 2017-06-03 NOTE — DISCHARGE INSTRUCTIONS
Discharge Instructions    Discharged to home by taxi with relative. Discharged via wheelchair, hospital escort: Yes.  Special equipment needed: Not Applicable    Be sure to schedule a follow-up appointment with your primary care doctor or any specialists as instructed.     Discharge Plan:   Diet Plan: Discussed  Activity Level: Discussed  Confirmed Follow up Appointment: Appointment Scheduled  Confirmed Symptoms Management: Discussed  Medication Reconciliation Updated: Yes  Influenza Vaccine Indication: Not indicated: Previously immunized this influenza season and > 8 years of age    I understand that a diet low in cholesterol, fat, and sodium is recommended for good health. Unless I have been given specific instructions below for another diet, I accept this instruction as my diet prescription.   Other diet:     Special Instructions: None    · Is patient discharged on Warfarin / Coumadin?   No     · Is patient Post Blood Transfusion?  No    Depression / Suicide Risk    As you are discharged from this [facility] facility, it is important to learn how to keep safe from harming yourself.    Recognize the warning signs:  · Abrupt changes in personality, positive or negative- including increase in energy   · Giving away possessions  · Change in eating patterns- significant weight changes-  positive or negative  · Change in sleeping patterns- unable to sleep or sleeping all the time   · Unwillingness or inability to communicate  · Depression  · Unusual sadness, discouragement and loneliness  · Talk of wanting to die  · Neglect of personal appearance   · Rebelliousness- reckless behavior  · Withdrawal from people/activities they love  · Confusion- inability to concentrate     If you or a loved one observes any of these behaviors or has concerns about self-harm, here's what you can do:  · Talk about it- your feelings and reasons for harming yourself  · Remove any means that you might use to hurt yourself (examples:  pills, rope, extension cords, firearm)  · Get professional help from the community (Mental Health, Substance Abuse, psychological counseling)  · Do not be alone:Call your Safe Contact- someone whom you trust who will be there for you.  · Call your local CRISIS HOTLINE 426-5576 or 458-323-9663  · Call your local Children's Mobile Crisis Response Team [place] (391) 861-5714 or www.ReadyPulse  · Call the toll free National Suicide Prevention Hotlines   · National Suicide Prevention Lifeline 753-707-MYPD (6711)  · [employer] Hope Line Network 800-SUICIDE (783-8008)      Syncope  Syncope is a medical term for fainting or passing out. This means you lose consciousness and drop to the ground. People are generally unconscious for less than 5 minutes. You may have some muscle twitches for up to 15 seconds before waking up and returning to normal. Syncope occurs more often in older adults, but it can happen to anyone. While most causes of syncope are not dangerous, syncope can be a sign of a serious medical problem. It is important to seek medical care.   CAUSES   Syncope is caused by a sudden drop in blood flow to the brain. The specific cause is often not determined. Factors that can bring on syncope include:  · Taking medicines that lower blood pressure.  · Sudden changes in posture, such as standing up quickly.  · Taking more medicine than prescribed.  · Standing in one place for too long.  · Seizure disorders.  · Dehydration and excessive exposure to heat.  · Low blood sugar (hypoglycemia).  · Straining to have a bowel movement.  · Heart disease, irregular heartbeat, or other circulatory problems.  · Fear, emotional distress, seeing blood, or severe pain.  SYMPTOMS   Right before fainting, you may:  2. Feel dizzy or light-headed.  3. Feel nauseous.  4. See all white or all black in your field of vision.  5. Have cold, clammy skin.  DIAGNOSIS   Your health care provider will ask about your symptoms, perform a  physical exam, and perform an electrocardiogram (ECG) to record the electrical activity of your heart. Your health care provider may also perform other heart or blood tests to determine the cause of your syncope which may include:  2. Transthoracic echocardiogram (TTE). During echocardiography, sound waves are used to evaluate how blood flows through your heart.  3. Transesophageal echocardiogram (SONIA).  4. Cardiac monitoring. This allows your health care provider to monitor your heart rate and rhythm in real time.  5. Holter monitor. This is a portable device that records your heartbeat and can help diagnose heart arrhythmias. It allows your health care provider to track your heart activity for several days, if needed.  6. Stress tests by exercise or by giving medicine that makes the heart beat faster.  TREATMENT   In most cases, no treatment is needed. Depending on the cause of your syncope, your health care provider may recommend changing or stopping some of your medicines.  HOME CARE INSTRUCTIONS  2. Have someone stay with you until you feel stable.  3. Do not drive, use machinery, or play sports until your health care provider says it is okay.  4. Keep all follow-up appointments as directed by your health care provider.  5. Lie down right away if you start feeling like you might faint. Breathe deeply and steadily. Wait until all the symptoms have passed.  6. Drink enough fluids to keep your urine clear or pale yellow.  7. If you are taking blood pressure or heart medicine, get up slowly and take several minutes to sit and then stand. This can reduce dizziness.  SEEK IMMEDIATE MEDICAL CARE IF:   2. You have a severe headache.  3. You have unusual pain in the chest, abdomen, or back.  4. You are bleeding from your mouth or rectum, or you have black or tarry stool.  5. You have an irregular or very fast heartbeat.  6. You have pain with breathing.  7. You have repeated fainting or seizure-like jerking during an  episode.  8. You faint when sitting or lying down.  9. You have confusion.  10. You have trouble walking.  11. You have severe weakness.  12. You have vision problems.  If you fainted, call your local emergency services (911 in [place] Do not drive yourself to the hospital.      This information is not intended to replace advice given to you by your health care provider. Make sure you discuss any questions you have with your health care provider.     Document Released: 12/18/2006 Document Revised: 05/03/2016 Document Reviewed: 02/15/2013  [employer] Interactive Patient Education ©2016 [employer]

## 2017-06-03 NOTE — PROGRESS NOTES
Received bedside report from Lala VALDOVINOS .  Pt is a/o x 4.  POC discussed w/ pt, verbalizes understanding,  Bed is locked, low, belongings in reach, call light in reach.  Talking w/ wife on phone/

## 2017-06-03 NOTE — DISCHARGE SUMMARY
DATE OF ADMISSION:  06/01/2017    DATE OF DISCHARGE:  06/03/2017    DISCHARGE DIAGNOSES:  1.  Syncope, unclear etiology, possibly related to bradycardia.  2.  Sinus bradycardia, probably medication induced.  3.  Diabetes mellitus type 2.  4.  Chronic obstructive pulmonary disease, not on home O2.  5.  Chronic anemia.  6.  Chronic tremors.  7.  History of paroxysmal atrial fibrillation.  8.  History of bipolar disorder.  9.  History of schizophrenia.    CONDITION:  Stable.    ACTIVITY:  As tolerated.    DIET:  Diabetic.    DISCHARGE MEDICATIONS:  Resume these medications at home including albuterol   inhalers p.r.n., amiodarone 200 mg daily, aspirin 81 mg daily, gabapentin 300   mg t.i.d., gemfibrozil 600 mg b.i.d., lovastatin 40 mg daily,  metformin 1000   mg b.i.d., Risperdal 4 mg daily, rivaroxaban 20 mg daily, Tiotropium 1 cap by   mouth.  Stop taking these medication including citalopram, glyburide and   metoprolol.  Patient will stop Xarelto, but continue with aspirin at home.    FOLLOWUP:  Follow up with primary care physician in 1 week.    HOSPITAL COURSE:  This is a 61-year-old male with a history of previous   episodes of syncope, presents with another episode of syncope while walking   outside.  See H and P for further details.  Patient did not recall the event   well, but stated that his wife did witness that he had a syncopal episode and   collapse while walking.  Patient was admitted for further workup here.  Blood   test results were unremarkable except for initial high potassium of 5.7, but   normalized with repeat blood test.  Glucose was 206 on admission and troponins   were negative.  His lactic acid was mildly high at 2.4, but normalized with   repeat level.  There is no evidence of active infection going on.  Other   testing done including CT of the cervical spine, which showed no fracture.  CT   of the head showed no acute process.  CTA of the chest showed no evidence of   aortic dissection or  mediastinal or retroperitoneal hematoma.  Some distended   small bowel loops with wall edema, left side of the abdomen.  Small free fluid   collection within the abdomen and pelvis.  Diverticulosis without   diverticulitis.  Hepatic steatosis.  Tiny left adrenal gland nodules.    Question tiny calcified gallstones.  Patient has no abdominal symptoms;   however, to correlate with the CT finding.  He is noted to have bradycardia   with heart rates down into the 40s at times.  Blood pressure was lowish in the   90s on admission, but improved as his beta-blocker was held.  He was kept on   the amiodarone only.  Given the fact that citalopram in combination with   amiodarone can prolong QT interval and lead to syncope, citalopram was   stopped.  Patient was seen by cardiology in consultation and Dr. Tereza Eaton did not think that the patient had active ischemic symptoms and does   not warrant further cardiac workup at this time.  She recommends that patient   stay on aspirin instead of Xarelto given his high risk for falling.  At the   time of discharge, patient is feeling much better, able to ambulate with his   walker as usual and his blood pressure is stable along with his heart rate is   in the 50s and he has no other complaints.       ____________________________________     MD BRIDGETT Mills / ALEX    DD:  06/03/2017 09:10:50  DT:  06/03/2017 10:24:40    D#:  [id number]  Job#:  [id number]

## 2017-06-03 NOTE — PROGRESS NOTES
Discharged orders received. All lines and monitors discontinued. Reviewed discharge paperwork with patient and family. Discussed diet, activity, follow up care, and medication. No questions at this time. Patient to be discharged via wheelchair to home.

## 2017-06-04 ENCOUNTER — PATIENT OUTREACH (OUTPATIENT)
Dept: HEALTH INFORMATION MANAGEMENT | Facility: OTHER | Age: 62
End: 2017-06-04

## 2017-09-14 ENCOUNTER — NON-PROVIDER VISIT (OUTPATIENT)
Dept: OCCUPATIONAL MEDICINE | Facility: CLINIC | Age: 62
End: 2017-09-14

## 2017-09-14 DIAGNOSIS — Z02.1 PRE-EMPLOYMENT DRUG SCREENING: ICD-10-CM

## 2017-09-14 LAB
AMP AMPHETAMINE: NORMAL
COC COCAINE: NORMAL
INT CON NEG: NORMAL
INT CON POS: NORMAL
MET METHAMPHETAMINES: NORMAL
OPI OPIATES: NORMAL
PCP PHENCYCLIDINE: NORMAL
POC DRUG COMMENT 753798-OCCUPATIONAL HEALTH: NORMAL
THC: NORMAL

## 2017-09-14 PROCEDURE — 80305 DRUG TEST PRSMV DIR OPT OBS: CPT | Performed by: PREVENTIVE MEDICINE

## 2017-09-14 PROCEDURE — 8911 PR MRO FEE: Performed by: INTERNAL MEDICINE

## 2017-10-04 NOTE — PROGRESS NOTES
Non-conclusive UDS follow up.  MRO confirmation fees need to be charged.  MRO report date 9/20/17

## 2018-03-01 ENCOUNTER — HOSPITAL ENCOUNTER (OUTPATIENT)
Dept: LAB | Facility: MEDICAL CENTER | Age: 63
End: 2018-03-01
Attending: FAMILY MEDICINE
Payer: COMMERCIAL

## 2018-03-01 LAB
ALBUMIN SERPL BCP-MCNC: 4.2 G/DL (ref 3.2–4.9)
ALBUMIN/GLOB SERPL: 1.3 G/DL
ALP SERPL-CCNC: 70 U/L (ref 30–99)
ALT SERPL-CCNC: 20 U/L (ref 2–50)
ANION GAP SERPL CALC-SCNC: 8 MMOL/L (ref 0–11.9)
AST SERPL-CCNC: 22 U/L (ref 12–45)
BILIRUB SERPL-MCNC: 0.8 MG/DL (ref 0.1–1.5)
BUN SERPL-MCNC: 19 MG/DL (ref 8–22)
CALCIUM SERPL-MCNC: 9.7 MG/DL (ref 8.5–10.5)
CHLORIDE SERPL-SCNC: 104 MMOL/L (ref 96–112)
CHOLEST SERPL-MCNC: 262 MG/DL (ref 100–199)
CO2 SERPL-SCNC: 25 MMOL/L (ref 20–33)
CREAT SERPL-MCNC: 0.81 MG/DL (ref 0.5–1.4)
ERYTHROCYTE [DISTWIDTH] IN BLOOD BY AUTOMATED COUNT: 43.5 FL (ref 35.9–50)
EST. AVERAGE GLUCOSE BLD GHB EST-MCNC: 143 MG/DL
GLOBULIN SER CALC-MCNC: 3.2 G/DL (ref 1.9–3.5)
GLUCOSE SERPL-MCNC: 128 MG/DL (ref 65–99)
HBA1C MFR BLD: 6.6 % (ref 0–5.6)
HCT VFR BLD AUTO: 49.8 % (ref 42–52)
HDLC SERPL-MCNC: 36 MG/DL
HGB BLD-MCNC: 15.4 G/DL (ref 14–18)
LDLC SERPL CALC-MCNC: 185 MG/DL
MCH RBC QN AUTO: 27.2 PG (ref 27–33)
MCHC RBC AUTO-ENTMCNC: 30.9 G/DL (ref 33.7–35.3)
MCV RBC AUTO: 88 FL (ref 81.4–97.8)
PLATELET # BLD AUTO: 263 K/UL (ref 164–446)
PMV BLD AUTO: 10.8 FL (ref 9–12.9)
POTASSIUM SERPL-SCNC: 4.2 MMOL/L (ref 3.6–5.5)
PROT SERPL-MCNC: 7.4 G/DL (ref 6–8.2)
RBC # BLD AUTO: 5.66 M/UL (ref 4.7–6.1)
SODIUM SERPL-SCNC: 137 MMOL/L (ref 135–145)
TESTOST SERPL-MCNC: 152 NG/DL (ref 175–781)
TRIGL SERPL-MCNC: 205 MG/DL (ref 0–149)
WBC # BLD AUTO: 7.9 K/UL (ref 4.8–10.8)

## 2018-03-01 PROCEDURE — 80061 LIPID PANEL: CPT

## 2018-03-01 PROCEDURE — 36415 COLL VENOUS BLD VENIPUNCTURE: CPT

## 2018-03-01 PROCEDURE — 85027 COMPLETE CBC AUTOMATED: CPT

## 2018-03-01 PROCEDURE — 80053 COMPREHEN METABOLIC PANEL: CPT

## 2018-03-01 PROCEDURE — 84403 ASSAY OF TOTAL TESTOSTERONE: CPT

## 2018-03-01 PROCEDURE — 83036 HEMOGLOBIN GLYCOSYLATED A1C: CPT

## 2018-03-24 ENCOUNTER — HOSPITAL ENCOUNTER (OUTPATIENT)
Dept: RADIOLOGY | Facility: MEDICAL CENTER | Age: 63
End: 2018-03-24
Attending: FAMILY MEDICINE
Payer: COMMERCIAL

## 2018-03-24 ENCOUNTER — OFFICE VISIT (OUTPATIENT)
Dept: URGENT CARE | Facility: PHYSICIAN GROUP | Age: 63
End: 2018-03-24
Payer: COMMERCIAL

## 2018-03-24 VITALS
TEMPERATURE: 98.4 F | OXYGEN SATURATION: 96 % | HEIGHT: 75 IN | BODY MASS INDEX: 24.74 KG/M2 | HEART RATE: 82 BPM | DIASTOLIC BLOOD PRESSURE: 80 MMHG | RESPIRATION RATE: 15 BRPM | WEIGHT: 199 LBS | SYSTOLIC BLOOD PRESSURE: 132 MMHG

## 2018-03-24 DIAGNOSIS — R05.8 PRODUCTIVE COUGH: ICD-10-CM

## 2018-03-24 DIAGNOSIS — J40 BRONCHITIS: ICD-10-CM

## 2018-03-24 PROCEDURE — 94640 AIRWAY INHALATION TREATMENT: CPT | Performed by: FAMILY MEDICINE

## 2018-03-24 PROCEDURE — 99214 OFFICE O/P EST MOD 30 MIN: CPT | Mod: 25 | Performed by: FAMILY MEDICINE

## 2018-03-24 PROCEDURE — 71046 X-RAY EXAM CHEST 2 VIEWS: CPT

## 2018-03-24 RX ORDER — LISINOPRIL 5 MG/1
5 TABLET ORAL DAILY
COMMUNITY

## 2018-03-24 RX ORDER — GLYBURIDE 2.5 MG/1
2.5 TABLET ORAL
COMMUNITY

## 2018-03-24 RX ORDER — PROPRANOLOL HYDROCHLORIDE 20 MG/1
20 TABLET ORAL 3 TIMES DAILY
COMMUNITY

## 2018-03-24 RX ORDER — METHYLPREDNISOLONE 4 MG/1
TABLET ORAL
Qty: 21 TAB | Refills: 0 | Status: SHIPPED | OUTPATIENT
Start: 2018-03-24 | End: 2018-05-02

## 2018-03-24 RX ORDER — ATORVASTATIN CALCIUM 80 MG/1
80 TABLET, FILM COATED ORAL NIGHTLY
COMMUNITY

## 2018-03-24 RX ORDER — FLUOXETINE HYDROCHLORIDE 20 MG/1
20 CAPSULE ORAL DAILY
COMMUNITY

## 2018-03-24 RX ORDER — AZITHROMYCIN 250 MG/1
TABLET, FILM COATED ORAL
Qty: 6 TAB | Refills: 0 | Status: SHIPPED | OUTPATIENT
Start: 2018-03-24 | End: 2018-05-02

## 2018-03-24 RX ORDER — ALBUTEROL SULFATE 2.5 MG/3ML
2.5 SOLUTION RESPIRATORY (INHALATION) ONCE
Status: COMPLETED | OUTPATIENT
Start: 2018-03-24 | End: 2018-03-24

## 2018-03-24 RX ADMIN — ALBUTEROL SULFATE 2.5 MG: 2.5 SOLUTION RESPIRATORY (INHALATION) at 11:55

## 2018-03-24 ASSESSMENT — ENCOUNTER SYMPTOMS
BACK PAIN: 0
VOMITING: 0
SPUTUM PRODUCTION: 1
NAUSEA: 0
COUGH: 1
CHILLS: 0
HEADACHES: 0
SINUS PAIN: 0
SHORTNESS OF BREATH: 0
ABDOMINAL PAIN: 0
MYALGIAS: 0
SORE THROAT: 0
DIZZINESS: 0
EYE DISCHARGE: 0
FEVER: 0
WHEEZING: 1
EYE REDNESS: 0

## 2018-03-24 NOTE — PROGRESS NOTES
Subjective:      Satya Aguilar is a 62 y.o. male who presents with Cough (X 3 months.)            Subjective:     Satya Aguilar is a 62 y.o. male here for evaluation of a cough.  The cough is productive of green/yellow sputum, with shortness of breath, waxing and waning over time. Onset of symptoms was 3 months ago, unchanged since that time.  Associated symptoms include SOB, chills, sputum production . Patient does have a history of asthma.  Patient does not have a history of smoking. Patient  has not had a previous chest x-ray. He was seen by his PCP about 3 weeks ago and was started on abx at that time. He finished the course of abx 2 weeks ago.       Past Medical History:  No date: Asthma  No date: COPD (chronic obstructive pulmonary disease) (*  No date: Diabetes (CMS-HCC)  No date: High cholesterol  No date: Hypertension  No date: Paroxysmal atrial fibrillation (CMS-HCC)  Patient Active Problem List    Syncope         Priority: High (1)         Date Noted: 06/02/2017      Bradycardia         Priority: High (1)         Date Noted: 06/02/2017      DM2 (diabetes mellitus, type 2) (CMS-HCC)         Priority: Medium (2)         Date Noted: 05/05/2017      COPD (chronic obstructive pulmonary disease) (CMS-HCC)         Priority: Low (3)         Date Noted: 05/03/2017      Iron deficiency anemia secondary to inadequate dietary iron intake         Priority: Low (3)         Date Noted: 02/22/2017      Anemia         Date Noted: 06/02/2017      Tremor of both hands         Date Noted: 05/31/2017      Peripheral neuropathy (CMS-HCC)         Date Noted: 05/31/2017      Schizophreniform disorder (CMS-HCC)         Date Noted: 05/31/2017      Bipolar affective (CMS-HCC)         Date Noted: 05/31/2017      Mixed hyperlipidemia         Date Noted: 05/31/2017      Paroxysmal atrial fibrillation (CMS-HCC)         Date Noted: 05/05/2017      Right foot ulcer (CMS-HCC)         Date Noted: 05/04/2017      DM type 2 (diabetes  mellitus, type 2) (CMS-[facility])         Date Noted: 05/03/2017      Bradycardia with 51-60 beats per minute         Date Noted: 02/22/2017      No past surgical history on file.  No family history on file.    Social History    Marital status:              Spouse name:                       Years of education:                 Number of children:               Social History Main Topics    Smoking status: Never Smoker                                                                Smokeless tobacco: Never Used                        Alcohol use: Yes                Comment: occ    Drug use: Yes                Types: Inhaled       Comment: marijuana daily    Current Outpatient Prescriptions:  glyBURIDE (DIABETA) 2.5 MG Tab, Take 2.5 mg by mouth every morning with breakfast., Disp: , Rfl:   FLUoxetine (PROZAC) 20 MG Cap, Take 20 mg by mouth every day., Disp: , Rfl:   propranolol (INDERAL) 20 MG Tab, Take 20 mg by mouth 3 times a day., Disp: , Rfl:   atorvastatin (LIPITOR) 80 MG tablet, Take 80 mg by mouth every evening., Disp: , Rfl:   lisinopril (PRINIVIL) 5 MG Tab, Take 5 mg by mouth every day., Disp: , Rfl:   gabapentin (NEURONTIN) 300 MG Cap, Take 300 mg by mouth 3 times a day., Disp: , Rfl:   lovastatin (MEVACOR) 40 MG tablet, Take 1 Tab by mouth every evening., Disp: 30 Tab, Rfl: 11  albuterol 108 (90 BASE) MCG/ACT Aero Soln inhalation aerosol, Inhale 2 Puffs by mouth every 1 hour as needed., Disp: 8.5 g, Rfl: 3  tiotropium (SPIRIVA) 18 MCG Cap, Inhale 1 Cap by mouth every day., Disp: 30 Cap, Rfl: 3  metformin (GLUCOPHAGE) 1000 MG tablet, Take 1,000 mg by mouth 2 times a day, with meals., Disp: , Rfl:   risperidone (RISPERDAL) 4 MG tablet, Take 4 mg by mouth every evening., Disp: , Rfl:   amiodarone (CORDARONE) 200 MG Tab, Take 1 Tab by mouth every day., Disp: 30 Tab, Rfl: 11  gemfibrozil (LOPID) 600 MG Tab, Take 600 mg by mouth 2 times a day., Disp: , Rfl:   aspirin 81 MG tablet, Take 81 mg by mouth every day.,  Disp: , Rfl:     No current facility-administered medications for this visit.     No Known Allergies                 Review of Systems   Constitutional: Negative for chills, fever and malaise/fatigue.   HENT: Positive for congestion. Negative for sinus pain and sore throat.    Eyes: Negative for discharge and redness.   Respiratory: Positive for cough, sputum production and wheezing. Negative for shortness of breath.    Cardiovascular: Negative for chest pain.   Gastrointestinal: Negative for abdominal pain, nausea and vomiting.   Genitourinary: Negative for dysuria and urgency.   Musculoskeletal: Negative for back pain and myalgias.   Skin: Negative for itching and rash.   Neurological: Negative for dizziness and headaches.          Objective:     There were no vitals taken for this visit.     Physical Exam   Constitutional: He is oriented to person, place, and time. He appears well-developed.   HENT:   Head: Normocephalic.   Right Ear: External ear normal.   Left Ear: External ear normal.   Eyes: EOM are normal. Pupils are equal, round, and reactive to light. Right eye exhibits no discharge. Left eye exhibits no discharge.   Neck: Normal range of motion. Neck supple.   Cardiovascular: Normal rate, regular rhythm and normal heart sounds.    Pulmonary/Chest: Effort normal. No respiratory distress. He has wheezes. He has no rales.   Abdominal: Soft. Bowel sounds are normal.   Lymphadenopathy:     He has no cervical adenopathy.   Neurological: He is alert and oriented to person, place, and time.   Skin: Skin is warm and dry.          PMH:  has a past medical history of Asthma; COPD (chronic obstructive pulmonary disease) (CMS-HCC); Diabetes (CMS-HCC); High cholesterol; Hypertension; and Paroxysmal atrial fibrillation (CMS-HCC).  MEDS:   Current Outpatient Prescriptions:   •  glyBURIDE (DIABETA) 2.5 MG Tab, Take 2.5 mg by mouth every morning with breakfast., Disp: , Rfl:   •  FLUoxetine (PROZAC) 20 MG Cap, Take 20 mg  by mouth every day., Disp: , Rfl:   •  propranolol (INDERAL) 20 MG Tab, Take 20 mg by mouth 3 times a day., Disp: , Rfl:   •  atorvastatin (LIPITOR) 80 MG tablet, Take 80 mg by mouth every evening., Disp: , Rfl:   •  lisinopril (PRINIVIL) 5 MG Tab, Take 5 mg by mouth every day., Disp: , Rfl:   •  gabapentin (NEURONTIN) 300 MG Cap, Take 300 mg by mouth 3 times a day., Disp: , Rfl:   •  lovastatin (MEVACOR) 40 MG tablet, Take 1 Tab by mouth every evening., Disp: 30 Tab, Rfl: 11  •  albuterol 108 (90 BASE) MCG/ACT Aero Soln inhalation aerosol, Inhale 2 Puffs by mouth every 1 hour as needed., Disp: 8.5 g, Rfl: 3  •  tiotropium (SPIRIVA) 18 MCG Cap, Inhale 1 Cap by mouth every day., Disp: 30 Cap, Rfl: 3  •  metformin (GLUCOPHAGE) 1000 MG tablet, Take 1,000 mg by mouth 2 times a day, with meals., Disp: , Rfl:   •  risperidone (RISPERDAL) 4 MG tablet, Take 4 mg by mouth every evening., Disp: , Rfl:   •  amiodarone (CORDARONE) 200 MG Tab, Take 1 Tab by mouth every day., Disp: 30 Tab, Rfl: 11  •  gemfibrozil (LOPID) 600 MG Tab, Take 600 mg by mouth 2 times a day., Disp: , Rfl:   •  aspirin 81 MG tablet, Take 81 mg by mouth every day., Disp: , Rfl:   ALLERGIES: No Known Allergies  SURGHX: No past surgical history on file.  SOCHX:  reports that he has never smoked. He has never used smokeless tobacco. He reports that he drinks alcohol. He reports that he uses drugs, including Inhaled.  FH: Family history was reviewed, no pertinent findings to report       Assessment/Plan:     Assessment:    Bronchitis - Acute     Plan:  CXR: neg for pneumonia   Albuterol neb done in  and the wheezing improved.   antibiotics  anti-tussive  B2 Inhaler  Medrol doze pack     Differential diagnoses, natural history, supportive care discussed  Indications for immediate care in an emergency department, when to return to the urgent care, and when to follow up with primary care provider discussed at length. Patient espressed understanding and agreed  to do so.

## 2018-03-24 NOTE — PATIENT INSTRUCTIONS

## 2018-05-02 ENCOUNTER — OFFICE VISIT (OUTPATIENT)
Dept: CARDIOLOGY | Facility: MEDICAL CENTER | Age: 63
End: 2018-05-02
Payer: COMMERCIAL

## 2018-05-02 VITALS
HEART RATE: 56 BPM | OXYGEN SATURATION: 92 % | HEIGHT: 75 IN | SYSTOLIC BLOOD PRESSURE: 120 MMHG | BODY MASS INDEX: 25.27 KG/M2 | WEIGHT: 203.2 LBS | DIASTOLIC BLOOD PRESSURE: 74 MMHG

## 2018-05-02 DIAGNOSIS — I48.0 PAROXYSMAL ATRIAL FIBRILLATION (HCC): ICD-10-CM

## 2018-05-02 DIAGNOSIS — R55 SYNCOPE, UNSPECIFIED SYNCOPE TYPE: ICD-10-CM

## 2018-05-02 DIAGNOSIS — I10 ESSENTIAL HYPERTENSION: ICD-10-CM

## 2018-05-02 DIAGNOSIS — E78.2 MIXED HYPERLIPIDEMIA: ICD-10-CM

## 2018-05-02 PROCEDURE — 99214 OFFICE O/P EST MOD 30 MIN: CPT | Performed by: INTERNAL MEDICINE

## 2018-05-02 RX ORDER — TERBINAFINE HYDROCHLORIDE 250 MG/1
250 TABLET ORAL DAILY
COMMUNITY

## 2018-05-02 ASSESSMENT — ENCOUNTER SYMPTOMS
ABDOMINAL PAIN: 0
DEPRESSION: 0
PND: 0
DIZZINESS: 0
LOSS OF CONSCIOUSNESS: 0
SHORTNESS OF BREATH: 0
PALPITATIONS: 0
ORTHOPNEA: 0
FALLS: 0

## 2018-05-02 NOTE — LETTER
[facility]   [address], [address]  [place] [address]  Phone: [phone]  Fax: [phone]              Satya Aguilar  1955    Encounter Date: 5/2/2018    Mel Nava M.D.          PROGRESS NOTE:  Chief Complaint   Patient presents with   • Syncope       Subjective:   Satya Aguilar is a 62 y.o. male who presents today to follow-up on his syncope. In June 2017 patient was syncopal episode after smoking marijuana. Syncope was thought to be secondary to dehydration. Since then patient denies any syncopal episodes. No dizziness either.    Patient was very emotional today. He lost his wife to lung cancer 8 months ago and lost his mom for the same reason when he was 13. He has decided to move to [place] at the end of this month to be close to his father.    He has a history of paroxysmal atrial fibrillation. He was previously on amiodarone and Xarelto which the patient stopped as he lost his insurance. He now has insurance again but only until the 20th of this month at which time he is getting laid off. He is currently not on aspirin. Denies any palpitations.    Has hyperlipidemia and is currently on a statin which he is tolerating well.   His blood pressure is usually well controlled.      Past Medical History:   Diagnosis Date   • Asthma    • COPD (chronic obstructive pulmonary disease) (HCC)    • Diabetes (HCC)    • High cholesterol    • Hypertension    • Paroxysmal atrial fibrillation (HCC)      History reviewed. No pertinent surgical history.  History reviewed. No pertinent family history.  Social History     Social History   • Marital status:      Spouse name: N/A   • Number of children: N/A   • Years of education: N/A     Occupational History   • Not on file.     Social History Main Topics   • Smoking status: Never Smoker   • Smokeless tobacco: Never Used   • Alcohol use Yes      Comment: occ   • Drug use: Yes     Types: Inhaled      Comment:  marijuana daily   • Sexual activity: Not on file     Other Topics Concern   • Not on file     Social History Narrative   • No narrative on file     No Known Allergies  Outpatient Encounter Prescriptions as of 5/2/2018   Medication Sig Dispense Refill   • terbinafine (LAMISIL) 250 MG Tab Take 250 mg by mouth every day.     • aspirin EC (ECOTRIN) 81 MG Tablet Delayed Response Take 1 Tab by mouth every day. 30 Tab    • glyBURIDE (DIABETA) 2.5 MG Tab Take 2.5 mg by mouth every morning with breakfast.     • FLUoxetine (PROZAC) 20 MG Cap Take 20 mg by mouth every day.     • propranolol (INDERAL) 20 MG Tab Take 20 mg by mouth 3 times a day.     • atorvastatin (LIPITOR) 80 MG tablet Take 80 mg by mouth every evening.     • lisinopril (PRINIVIL) 5 MG Tab Take 5 mg by mouth every day.     • gabapentin (NEURONTIN) 300 MG Cap Take 300 mg by mouth 3 times a day.     • lovastatin (MEVACOR) 40 MG tablet Take 1 Tab by mouth every evening. 30 Tab 11   • albuterol 108 (90 BASE) MCG/ACT Aero Soln inhalation aerosol Inhale 2 Puffs by mouth every 1 hour as needed. 8.5 g 3   • metformin (GLUCOPHAGE) 1000 MG tablet Take 1,000 mg by mouth 2 times a day, with meals.     • [DISCONTINUED] azithromycin (ZITHROMAX) 250 MG Tab Use as directed with food 6 Tab 0   • [DISCONTINUED] MethylPREDNISolone (MEDROL DOSEPAK) 4 MG Tablet Therapy Pack Take as directed with food 21 Tab 0   • [DISCONTINUED] risperidone (RISPERDAL) 4 MG tablet Take 4 mg by mouth every evening.     • [DISCONTINUED] amiodarone (CORDARONE) 200 MG Tab Take 1 Tab by mouth every day. 30 Tab 11   • [DISCONTINUED] gemfibrozil (LOPID) 600 MG Tab Take 600 mg by mouth 2 times a day.     • [DISCONTINUED] aspirin 81 MG tablet Take 81 mg by mouth every day.     • [DISCONTINUED] tiotropium (SPIRIVA) 18 MCG Cap Inhale 1 Cap by mouth every day. 30 Cap 3     No facility-administered encounter medications on file as of 5/2/2018.      Review of Systems   Constitutional: Negative for  "malaise/fatigue.   Respiratory: Negative for shortness of breath.    Cardiovascular: Negative for chest pain, palpitations, orthopnea, leg swelling and PND.   Gastrointestinal: Negative for abdominal pain.   Musculoskeletal: Negative for falls.   Skin: Negative.    Neurological: Negative for dizziness and loss of consciousness.   Psychiatric/Behavioral: Negative for depression.   All other systems reviewed and are negative.       Objective:   /74   Pulse (!) 56   Ht 1.905 m (6' 3\")   Wt 92.2 kg (203 lb 3.2 oz)   SpO2 92%   BMI 25.40 kg/m²      Physical Exam   Constitutional: He is oriented to person, place, and time. He appears well-developed and well-nourished. No distress.   HENT:   Head: Normocephalic and atraumatic.   Eyes: Conjunctivae are normal.   Neck: Normal range of motion. Neck supple.   Cardiovascular: Normal rate, regular rhythm and normal heart sounds.  Exam reveals no gallop and no friction rub.    No murmur heard.  Pulmonary/Chest: Effort normal and breath sounds normal. No respiratory distress. He has no wheezes. He has no rales.   Abdominal: Soft. He exhibits no distension. There is no tenderness.   Musculoskeletal: He exhibits no edema.   Neurological: He is alert and oriented to person, place, and time.   Skin: Skin is warm and dry. He is not diaphoretic.   Psychiatric: He has a normal mood and affect. His behavior is normal.   Nursing note and vitals reviewed.    Labs performed in March 2018 was reviewed and showed hemoglobin 15.4. Creatinine 0.8. .    Assessment:     1. Syncope, unspecified syncope type     2. Paroxysmal atrial fibrillation (HCC)     3. Mixed hyperlipidemia     4. Essential hypertension         Medical Decision Making:  Today's Assessment / Status / Plan:     Hypertension: Blood pressure is at goal. Continue lisinopril at current dose. His renal function is stable.    Hyperlipidemia: LDL is not at goal. Ideally he needs to be on a high potency statin. " Unfortunately the patient will be losing his insurance in about 2 weeks and is moving out of state. For now continue lovastatin. I have encouraged the patient to establish with a cardiologist when he gets to [place] so his medications can be changed.    Syncope: Thought to be secondary to dehydration. Patient has not had any recurrent syncopal episodes. Patient was advised to stay hydrated as much as possible.    Paroxysmal atrial fibrillation: In sinus rhythm today. Currently on propranolol only. Was previously on amiodarone and Xarelto which could not be continued due to lack of insurance. Patient states that he cannot afford NOACs due to their high co-pay. Coumadin cannot be considered as the patient is about to move out of state. For now I have advised him to start a baby aspirin every day. Again he should establish with a cardiologist as soon as possible.    Return to clinic if needed.    Thank you for allowing me to participate in the care of this patient. Please do not hesitate to contact me with any questions.    Mel Nava MD  Cardiologist  [facility]      PLEASE NOTE: This dictation was created using voice recognition software.         Mohsen Tamasaby, M.D.  [address] [address]  VIA Facsimile: [phone]

## 2018-05-02 NOTE — PROGRESS NOTES
Chief Complaint   Patient presents with   • Syncope       Subjective:   Satya Aguilar is a 62 y.o. male who presents today to follow-up on his syncope. In June 2017 patient was syncopal episode after smoking marijuana. Syncope was thought to be secondary to dehydration. Since then patient denies any syncopal episodes. No dizziness either.    Patient was very emotional today. He lost his wife to lung cancer 8 months ago and lost his mom for the same reason when he was 13. He has decided to move to [place] at the end of this month to be close to his father.    He has a history of paroxysmal atrial fibrillation. He was previously on amiodarone and Xarelto which the patient stopped as he lost his insurance. He now has insurance again but only until the 20th of this month at which time he is getting laid off. He is currently not on aspirin. Denies any palpitations.    Has hyperlipidemia and is currently on a statin which he is tolerating well.   His blood pressure is usually well controlled.      Past Medical History:   Diagnosis Date   • Asthma    • COPD (chronic obstructive pulmonary disease) (HCC)    • Diabetes (HCC)    • High cholesterol    • Hypertension    • Paroxysmal atrial fibrillation (HCC)      History reviewed. No pertinent surgical history.  History reviewed. No pertinent family history.  Social History     Social History   • Marital status:      Spouse name: N/A   • Number of children: N/A   • Years of education: N/A     Occupational History   • Not on file.     Social History Main Topics   • Smoking status: Never Smoker   • Smokeless tobacco: Never Used   • Alcohol use Yes      Comment: occ   • Drug use: Yes     Types: Inhaled      Comment: marijuana daily   • Sexual activity: Not on file     Other Topics Concern   • Not on file     Social History Narrative   • No narrative on file     No Known Allergies  Outpatient Encounter Prescriptions as of 5/2/2018   Medication Sig Dispense Refill   •  terbinafine (LAMISIL) 250 MG Tab Take 250 mg by mouth every day.     • aspirin EC (ECOTRIN) 81 MG Tablet Delayed Response Take 1 Tab by mouth every day. 30 Tab    • glyBURIDE (DIABETA) 2.5 MG Tab Take 2.5 mg by mouth every morning with breakfast.     • FLUoxetine (PROZAC) 20 MG Cap Take 20 mg by mouth every day.     • propranolol (INDERAL) 20 MG Tab Take 20 mg by mouth 3 times a day.     • atorvastatin (LIPITOR) 80 MG tablet Take 80 mg by mouth every evening.     • lisinopril (PRINIVIL) 5 MG Tab Take 5 mg by mouth every day.     • gabapentin (NEURONTIN) 300 MG Cap Take 300 mg by mouth 3 times a day.     • lovastatin (MEVACOR) 40 MG tablet Take 1 Tab by mouth every evening. 30 Tab 11   • albuterol 108 (90 BASE) MCG/ACT Aero Soln inhalation aerosol Inhale 2 Puffs by mouth every 1 hour as needed. 8.5 g 3   • metformin (GLUCOPHAGE) 1000 MG tablet Take 1,000 mg by mouth 2 times a day, with meals.     • [DISCONTINUED] azithromycin (ZITHROMAX) 250 MG Tab Use as directed with food 6 Tab 0   • [DISCONTINUED] MethylPREDNISolone (MEDROL DOSEPAK) 4 MG Tablet Therapy Pack Take as directed with food 21 Tab 0   • [DISCONTINUED] risperidone (RISPERDAL) 4 MG tablet Take 4 mg by mouth every evening.     • [DISCONTINUED] amiodarone (CORDARONE) 200 MG Tab Take 1 Tab by mouth every day. 30 Tab 11   • [DISCONTINUED] gemfibrozil (LOPID) 600 MG Tab Take 600 mg by mouth 2 times a day.     • [DISCONTINUED] aspirin 81 MG tablet Take 81 mg by mouth every day.     • [DISCONTINUED] tiotropium (SPIRIVA) 18 MCG Cap Inhale 1 Cap by mouth every day. 30 Cap 3     No facility-administered encounter medications on file as of 5/2/2018.      Review of Systems   Constitutional: Negative for malaise/fatigue.   Respiratory: Negative for shortness of breath.    Cardiovascular: Negative for chest pain, palpitations, orthopnea, leg swelling and PND.   Gastrointestinal: Negative for abdominal pain.   Musculoskeletal: Negative for falls.   Skin: Negative.   "  Neurological: Negative for dizziness and loss of consciousness.   Psychiatric/Behavioral: Negative for depression.   All other systems reviewed and are negative.       Objective:   /74   Pulse (!) 56   Ht 1.905 m (6' 3\")   Wt 92.2 kg (203 lb 3.2 oz)   SpO2 92%   BMI 25.40 kg/m²     Physical Exam   Constitutional: He is oriented to person, place, and time. He appears well-developed and well-nourished. No distress.   HENT:   Head: Normocephalic and atraumatic.   Eyes: Conjunctivae are normal.   Neck: Normal range of motion. Neck supple.   Cardiovascular: Normal rate, regular rhythm and normal heart sounds.  Exam reveals no gallop and no friction rub.    No murmur heard.  Pulmonary/Chest: Effort normal and breath sounds normal. No respiratory distress. He has no wheezes. He has no rales.   Abdominal: Soft. He exhibits no distension. There is no tenderness.   Musculoskeletal: He exhibits no edema.   Neurological: He is alert and oriented to person, place, and time.   Skin: Skin is warm and dry. He is not diaphoretic.   Psychiatric: He has a normal mood and affect. His behavior is normal.   Nursing note and vitals reviewed.    Labs performed in March 2018 was reviewed and showed hemoglobin 15.4. Creatinine 0.8. .    Assessment:     1. Syncope, unspecified syncope type     2. Paroxysmal atrial fibrillation (HCC)     3. Mixed hyperlipidemia     4. Essential hypertension         Medical Decision Making:  Today's Assessment / Status / Plan:     Hypertension: Blood pressure is at goal. Continue lisinopril at current dose. His renal function is stable.    Hyperlipidemia: LDL is not at goal. Ideally he needs to be on a high potency statin. Unfortunately the patient will be losing his insurance in about 2 weeks and is moving out of state. For now continue lovastatin. I have encouraged the patient to establish with a cardiologist when he gets to [place] so his medications can be changed.    Syncope: Thought to " be secondary to dehydration. Patient has not had any recurrent syncopal episodes. Patient was advised to stay hydrated as much as possible.    Paroxysmal atrial fibrillation: In sinus rhythm today. Currently on propranolol only. Was previously on amiodarone and Xarelto which could not be continued due to lack of insurance. Patient states that he cannot afford NOACs due to their high co-pay. Coumadin cannot be considered as the patient is about to move out of state. For now I have advised him to start a baby aspirin every day. Again he should establish with a cardiologist as soon as possible.    Return to clinic if needed.    Thank you for allowing me to participate in the care of this patient. Please do not hesitate to contact me with any questions.    Mel Nava MD  Cardiologist  [facility]      PLEASE NOTE: This dictation was created using voice recognition software.

## 2019-07-11 NOTE — PROGRESS NOTES
----- Message from Heath Mittal MD sent at 7/10/2019 11:11 PM CDT -----  Mild anemia. No Iron Def.  Normal TSH.  Await colonoscopy. Given mild anemia would recommend EGD at same time.   Bedside report received. POC discussed with pt; all questions answered at this time. Pt converted back to A-flutter around 1920 last night.